# Patient Record
Sex: FEMALE | Employment: FULL TIME | URBAN - METROPOLITAN AREA
[De-identification: names, ages, dates, MRNs, and addresses within clinical notes are randomized per-mention and may not be internally consistent; named-entity substitution may affect disease eponyms.]

---

## 2017-01-06 ENCOUNTER — HOSPITAL ENCOUNTER (OUTPATIENT)
Dept: RADIOLOGY | Facility: HOSPITAL | Age: 57
Discharge: HOME | End: 2017-01-06
Attending: INTERNAL MEDICINE
Payer: MEDICARE

## 2017-01-06 ENCOUNTER — LAB VISIT (OUTPATIENT)
Dept: LAB | Facility: HOSPITAL | Age: 57
End: 2017-01-06
Attending: INTERNAL MEDICINE
Payer: MEDICARE

## 2017-01-06 DIAGNOSIS — Z12.31 ENCOUNTER FOR SCREENING MAMMOGRAM FOR BREAST CANCER: ICD-10-CM

## 2017-01-06 DIAGNOSIS — E87.6 HYPOKALEMIA: ICD-10-CM

## 2017-01-06 LAB
ANION GAP SERPL CALC-SCNC: 8 MMOL/L
BUN SERPL-MCNC: 19 MG/DL
CALCIUM SERPL-MCNC: 10.3 MG/DL
CHLORIDE SERPL-SCNC: 103 MMOL/L
CO2 SERPL-SCNC: 31 MMOL/L
CREAT SERPL-MCNC: 0.8 MG/DL
EST. GFR  (AFRICAN AMERICAN): >60 ML/MIN/1.73 M^2
EST. GFR  (NON AFRICAN AMERICAN): >60 ML/MIN/1.73 M^2
GLUCOSE SERPL-MCNC: 107 MG/DL
MAGNESIUM SERPL-MCNC: 2.2 MG/DL
POTASSIUM SERPL-SCNC: 4 MMOL/L
SODIUM SERPL-SCNC: 142 MMOL/L

## 2017-01-06 PROCEDURE — 36415 COLL VENOUS BLD VENIPUNCTURE: CPT | Mod: PO

## 2017-01-06 PROCEDURE — 83735 ASSAY OF MAGNESIUM: CPT

## 2017-01-06 PROCEDURE — 77067 SCR MAMMO BI INCL CAD: CPT | Mod: 26,,, | Performed by: RADIOLOGY

## 2017-01-06 PROCEDURE — 80048 BASIC METABOLIC PNL TOTAL CA: CPT

## 2017-01-06 PROCEDURE — 77063 BREAST TOMOSYNTHESIS BI: CPT | Mod: 26,,, | Performed by: RADIOLOGY

## 2017-01-09 ENCOUNTER — TELEPHONE (OUTPATIENT)
Dept: INTERNAL MEDICINE | Facility: CLINIC | Age: 57
End: 2017-01-09

## 2017-01-09 NOTE — TELEPHONE ENCOUNTER
----- Message from Cristal Enriquez MD sent at 1/8/2017  9:46 AM CST -----  Please note that your potassium has improved as has your glucose level  Please continue your potassium supplement as it appears to be working well for you.  Also note that your kidney function is good and your magnesium was normal.  We will plan to recheck the potassium when you return this summer  Cristal Tejeda

## 2017-01-11 ENCOUNTER — TELEPHONE (OUTPATIENT)
Dept: INTERNAL MEDICINE | Facility: CLINIC | Age: 57
End: 2017-01-11

## 2017-01-11 NOTE — TELEPHONE ENCOUNTER
----- Message from Cristal Enriquez MD sent at 1/10/2017  9:44 PM CST -----  Mammogram results  Cristal Tejeda

## 2017-01-16 ENCOUNTER — TELEPHONE (OUTPATIENT)
Dept: INTERNAL MEDICINE | Facility: CLINIC | Age: 57
End: 2017-01-16

## 2017-01-16 NOTE — TELEPHONE ENCOUNTER
----- Message from Cristal Enriquez MD sent at 1/15/2017  9:35 AM CST -----  Please note that your bone density revealed osteopenia ( thinning of the bone)  But not to the degree that prescriptive medication would be recommended  Taking calcium and vitamin D and exercising at least 180 minutes weekly will help strengthen your bones  Also,  PT ( physical therapy), usually a one time visit, to guide you with the exercises that help the bones most efficiently  Is a productive option.  Please advise if you are interested and a Consult order will be placed  Cristal Tejeda

## 2017-01-24 ENCOUNTER — TELEPHONE (OUTPATIENT)
Dept: INTERNAL MEDICINE | Facility: CLINIC | Age: 57
End: 2017-01-24

## 2017-01-24 NOTE — TELEPHONE ENCOUNTER
----- Message from Rosangela Kike sent at 1/24/2017  9:54 AM CST -----  Contact: [phone]  Patient is needing a Hep A vaccine and has an appointment to see  On 1-30 and would like to receive vaccine on that day for her Dr's visit. Please call to discuss.

## 2017-01-30 ENCOUNTER — OFFICE VISIT (OUTPATIENT)
Dept: INTERNAL MEDICINE | Facility: CLINIC | Age: 57
End: 2017-01-30
Payer: MEDICARE

## 2017-01-30 VITALS
HEIGHT: 64 IN | BODY MASS INDEX: 29.62 KG/M2 | TEMPERATURE: 98 F | WEIGHT: 173.5 LBS | DIASTOLIC BLOOD PRESSURE: 74 MMHG | RESPIRATION RATE: 16 BRPM | SYSTOLIC BLOOD PRESSURE: 124 MMHG | HEART RATE: 90 BPM

## 2017-01-30 DIAGNOSIS — R44.2 OLFACTORY HALLUCINATION: ICD-10-CM

## 2017-01-30 DIAGNOSIS — E87.5 HYPERKALEMIA: ICD-10-CM

## 2017-01-30 DIAGNOSIS — Z23 NEED FOR HEPATITIS A VACCINATION: ICD-10-CM

## 2017-01-30 DIAGNOSIS — I70.0 AORTIC ATHEROSCLEROSIS: ICD-10-CM

## 2017-01-30 DIAGNOSIS — I10 ESSENTIAL HYPERTENSION: Primary | ICD-10-CM

## 2017-01-30 PROCEDURE — 99499 UNLISTED E&M SERVICE: CPT | Mod: S$PBB,,, | Performed by: INTERNAL MEDICINE

## 2017-01-30 PROCEDURE — 99214 OFFICE O/P EST MOD 30 MIN: CPT | Mod: 25,S$GLB,, | Performed by: INTERNAL MEDICINE

## 2017-01-30 PROCEDURE — 3078F DIAST BP <80 MM HG: CPT | Mod: S$GLB,,, | Performed by: INTERNAL MEDICINE

## 2017-01-30 PROCEDURE — 90632 HEPA VACCINE ADULT IM: CPT | Mod: S$GLB,,, | Performed by: INTERNAL MEDICINE

## 2017-01-30 PROCEDURE — 90471 IMMUNIZATION ADMIN: CPT | Mod: S$GLB,,, | Performed by: INTERNAL MEDICINE

## 2017-01-30 PROCEDURE — 99999 PR PBB SHADOW E&M-EST. PATIENT-LVL III: CPT | Mod: PBBFAC,,, | Performed by: INTERNAL MEDICINE

## 2017-01-30 PROCEDURE — 1159F MED LIST DOCD IN RCRD: CPT | Mod: S$GLB,,, | Performed by: INTERNAL MEDICINE

## 2017-01-30 PROCEDURE — 3074F SYST BP LT 130 MM HG: CPT | Mod: S$GLB,,, | Performed by: INTERNAL MEDICINE

## 2017-01-30 RX ORDER — CIPROFLOXACIN 500 MG/1
500 TABLET ORAL 2 TIMES DAILY
Qty: 30 TABLET | Refills: 0 | Status: SHIPPED | OUTPATIENT
Start: 2017-01-30 | End: 2017-02-09

## 2017-01-30 NOTE — PROGRESS NOTES
56 y.o. femalepresents in f/u to  hypertension, and hypokalemia; and travel to [place]      HTN tx w/losartan 50 mg, and Dyzaide  Denied HA or dizziness  BP today==>124/74  Hypokalemia, improved w/ change in meds    Travel to [place], requests Hep A vaccine  Usually visits Mom in [facility] and doesn't worry 2/2 to being w/ family; but this time traveling across areas that she has not been to in yrs        ROS:  No fever, chills, or night sweats  No blurry vision or visual disturbance  No dysphagia or early satiety  No palpitations or tachycardia  No cough or wheezing  No GERD or abdominal pain  No numbness or focal deficits  Remainder of review negative except as previously noted    PAST MEDICAL HX: Reviewed  PAST SURGICAL HX: Reviewed  SOCIAL HX: Reviewed  FAMILY HX: Reviewed    PHYSICAL EXAM:  VS: As noted  GENERAL: WDWN, A&O, NAD, conversant and co-operative  EYES: Conj/lids unremarkable, sclera anicteric  MUSCULOSKELETAL: Gait normal. No CCE  NEUROLOGIC: CARRILLO. No tremor noted      IMPRESSION:  HTN, stable  HypoKalemia, improved   Aortic atherosclerosis, asx  Olfactory hallucinations, neg CT, plan Consult ENT   pt to call when she returns    PLAN:  Continue present meds  Hep A vaccine  Rx Cipro   ASA and compression socks for trip  Exercise  Consult ENT, pt to call when returns from trip  Call prn  RTC 6 mos

## 2017-03-16 ENCOUNTER — NURSE TRIAGE (OUTPATIENT)
Dept: ADMINISTRATIVE | Facility: CLINIC | Age: 57
End: 2017-03-16

## 2017-03-16 ENCOUNTER — TELEPHONE (OUTPATIENT)
Dept: INTERNAL MEDICINE | Facility: CLINIC | Age: 57
End: 2017-03-16

## 2017-03-16 NOTE — TELEPHONE ENCOUNTER
"This message is from the triage nurse.      Patient states for the last two days she has been feeling like the room is spinning and sometimes she has to sit down or hold on to something to keep her from falling. She has at times felt as though she may pass out. When she is sitting down she feels okay. She does report having diarrhea the past few days as well. She states she is drinking "a lot" of water and urinating regularly. She denies sob or chest pain. Her  is at home with her. Advised to be seen in ED and to have another adult to bring her. She does not want to do this. She is asking for a call back from her PCP nurse to get an appointment scheduled for tomorrow (after two pm if possible). Advised again regarding protocol recommendation. She doesn't feel this is necessary. Advised to drink fluids with electrolytes (gatorade or powerade) since she is having diarrhea and get up slowly when going from sitting to standing. Please contact caller with any further care advice.  "

## 2017-04-17 ENCOUNTER — LAB VISIT (OUTPATIENT)
Dept: LAB | Facility: HOSPITAL | Age: 57
End: 2017-04-17
Attending: INTERNAL MEDICINE
Payer: MEDICARE

## 2017-04-17 ENCOUNTER — OFFICE VISIT (OUTPATIENT)
Dept: INTERNAL MEDICINE | Facility: CLINIC | Age: 57
End: 2017-04-17
Payer: MEDICARE

## 2017-04-17 ENCOUNTER — PATIENT MESSAGE (OUTPATIENT)
Dept: INTERNAL MEDICINE | Facility: CLINIC | Age: 57
End: 2017-04-17

## 2017-04-17 VITALS
DIASTOLIC BLOOD PRESSURE: 82 MMHG | SYSTOLIC BLOOD PRESSURE: 138 MMHG | HEART RATE: 64 BPM | HEIGHT: 64 IN | BODY MASS INDEX: 29.32 KG/M2 | TEMPERATURE: 98 F | WEIGHT: 171.75 LBS | RESPIRATION RATE: 16 BRPM

## 2017-04-17 DIAGNOSIS — R19.5 DARK STOOLS: ICD-10-CM

## 2017-04-17 DIAGNOSIS — R10.13 EPIGASTRIC PAIN: Primary | ICD-10-CM

## 2017-04-17 DIAGNOSIS — R10.13 EPIGASTRIC PAIN: ICD-10-CM

## 2017-04-17 DIAGNOSIS — I10 ESSENTIAL HYPERTENSION: ICD-10-CM

## 2017-04-17 DIAGNOSIS — E87.6 HYPOKALEMIA: ICD-10-CM

## 2017-04-17 DIAGNOSIS — N62 MACROMASTIA: ICD-10-CM

## 2017-04-17 LAB
BASOPHILS # BLD AUTO: 0.02 K/UL
BASOPHILS NFR BLD: 0.2 %
DIFFERENTIAL METHOD: NORMAL
EOSINOPHIL # BLD AUTO: 0.2 K/UL
EOSINOPHIL NFR BLD: 1.7 %
ERYTHROCYTE [DISTWIDTH] IN BLOOD BY AUTOMATED COUNT: 12.6 %
HCT VFR BLD AUTO: 44.6 %
HGB BLD-MCNC: 14.8 G/DL
LYMPHOCYTES # BLD AUTO: 2.3 K/UL
LYMPHOCYTES NFR BLD: 24.3 %
MCH RBC QN AUTO: 28.6 PG
MCHC RBC AUTO-ENTMCNC: 33.2 %
MCV RBC AUTO: 86 FL
MONOCYTES # BLD AUTO: 0.5 K/UL
MONOCYTES NFR BLD: 5.7 %
NEUTROPHILS # BLD AUTO: 6.4 K/UL
NEUTROPHILS NFR BLD: 67.6 %
PLATELET # BLD AUTO: 237 K/UL
PMV BLD AUTO: 11.1 FL
RBC # BLD AUTO: 5.17 M/UL
WBC # BLD AUTO: 9.44 K/UL

## 2017-04-17 PROCEDURE — 99214 OFFICE O/P EST MOD 30 MIN: CPT | Mod: S$GLB,,, | Performed by: INTERNAL MEDICINE

## 2017-04-17 PROCEDURE — 83540 ASSAY OF IRON: CPT

## 2017-04-17 PROCEDURE — 3075F SYST BP GE 130 - 139MM HG: CPT | Mod: S$GLB,,, | Performed by: INTERNAL MEDICINE

## 2017-04-17 PROCEDURE — 85025 COMPLETE CBC W/AUTO DIFF WBC: CPT

## 2017-04-17 PROCEDURE — 99999 PR PBB SHADOW E&M-EST. PATIENT-LVL III: CPT | Mod: PBBFAC,,, | Performed by: INTERNAL MEDICINE

## 2017-04-17 PROCEDURE — 80053 COMPREHEN METABOLIC PANEL: CPT

## 2017-04-17 PROCEDURE — 1160F RVW MEDS BY RX/DR IN RCRD: CPT | Mod: S$GLB,,, | Performed by: INTERNAL MEDICINE

## 2017-04-17 PROCEDURE — 99499 UNLISTED E&M SERVICE: CPT | Mod: S$PBB,,, | Performed by: INTERNAL MEDICINE

## 2017-04-17 PROCEDURE — 3079F DIAST BP 80-89 MM HG: CPT | Mod: S$GLB,,, | Performed by: INTERNAL MEDICINE

## 2017-04-17 PROCEDURE — 36415 COLL VENOUS BLD VENIPUNCTURE: CPT | Mod: PO

## 2017-04-17 NOTE — PROGRESS NOTES
56 y.o. female presents in f/u to  hypertension, dizziness, and abdominal pain      Abdominal sx, BM 3-4 x daily  Sx Mid epigastric area w/ bloating; then more periumbilical area  Recent LBM and dizziness resolved  Tried to adjust diet and avoid sugar and carbs  Weight loss- none  Stool dark but no blood noted  Occ watery stool      HTN tx w/losartan 50 mg, and Dyazide  Denied HA or dizziness  BP today==>138/82  Hypokalemia, improved w/ change in meds    Neck, shoulder pain, and upper back pain x yrs w/ exacerbation of sx   Heavy breasts  Rashes in skin folds        ROS:  No fever, chills, or night sweats  No blurry vision or visual disturbance  No dysphagia or early satiety  No palpitations or tachycardia  No cough or wheezing  No GERD or abdominal pain  No numbness or focal deficits  Remainder of review negative except as previously noted    PAST MEDICAL HX: Reviewed  PAST SURGICAL HX: Reviewed  SOCIAL HX: Reviewed  FAMILY HX: Reviewed    PHYSICAL EXAM:  VS: As noted  GENERAL: WDWN, A&O, NAD, conversant and co-operative  EYES: Conj/lids unremarkable, sclera anicteric; eyelid rims pink  ENT: Mucus membrane /frenulum pink  Sinus NT  NECK: Supple w/o LN or TM  RESP: Efforts unlabored, lungs CTA  CV: Heart RRR, no carotid bruits noted, no LE edema, 1+ pedal pulses  GI: BS+, soft, tender over the mid epigastric and periumbilical region, =HSM noted  - percussion and no rebound  MUSCULOSKELETAL: Gait normal. No CCE, no CVAT  NEUROLOGIC: CARRILLO. No tremor noted  SKIN: Warm and dry      IMPRESSION:  Abdominal pain, mid epigastric and periumbilical  Dark stools, pt not taking iron supplement  HTN, stable  HypoKalemia, improved   Dizziness, resolved w/ increased hydration- fluids, electrolytes   did not  Go to ED as recommended  Macromastia, neck, shoulder, and back pain    PLAN:  Lab - CBC w/ iron studies  Consult GI  Start Nexium daily pre meals  Continue present meds  Rec Plastics for surgical options   Hydration- water  best  Call prn  RTC 6 mos

## 2017-04-18 LAB
ALBUMIN SERPL BCP-MCNC: 4.4 G/DL
ALP SERPL-CCNC: 117 U/L
ALT SERPL W/O P-5'-P-CCNC: 26 U/L
ANION GAP SERPL CALC-SCNC: 10 MMOL/L
AST SERPL-CCNC: 24 U/L
BILIRUB SERPL-MCNC: 0.3 MG/DL
BUN SERPL-MCNC: 17 MG/DL
CALCIUM SERPL-MCNC: 10.4 MG/DL
CHLORIDE SERPL-SCNC: 103 MMOL/L
CO2 SERPL-SCNC: 28 MMOL/L
CREAT SERPL-MCNC: 1 MG/DL
EST. GFR  (AFRICAN AMERICAN): >60 ML/MIN/1.73 M^2
EST. GFR  (NON AFRICAN AMERICAN): >60 ML/MIN/1.73 M^2
GLUCOSE SERPL-MCNC: 104 MG/DL
IRON SERPL-MCNC: 92 UG/DL
POTASSIUM SERPL-SCNC: 4.1 MMOL/L
PROT SERPL-MCNC: 7.8 G/DL
SATURATED IRON: 25 %
SODIUM SERPL-SCNC: 141 MMOL/L
TOTAL IRON BINDING CAPACITY: 364 UG/DL
TRANSFERRIN SERPL-MCNC: 246 MG/DL

## 2017-04-19 ENCOUNTER — TELEPHONE (OUTPATIENT)
Dept: INTERNAL MEDICINE | Facility: CLINIC | Age: 57
End: 2017-04-19

## 2017-04-19 ENCOUNTER — PATIENT MESSAGE (OUTPATIENT)
Dept: INTERNAL MEDICINE | Facility: CLINIC | Age: 57
End: 2017-04-19

## 2017-04-19 DIAGNOSIS — N62 MACROMASTIA: Primary | ICD-10-CM

## 2017-04-24 ENCOUNTER — TELEPHONE (OUTPATIENT)
Dept: INTERNAL MEDICINE | Facility: CLINIC | Age: 57
End: 2017-04-24

## 2017-04-24 NOTE — TELEPHONE ENCOUNTER
----- Message from Cristal Enriquez MD sent at 4/21/2017  2:14 PM CDT -----  Please note that your bloodwork was ok  Please do not hesitate to call/email if you have any questions or concerns  Cristal Tejeda

## 2017-04-26 ENCOUNTER — OFFICE VISIT (OUTPATIENT)
Dept: GASTROENTEROLOGY | Facility: CLINIC | Age: 57
End: 2017-04-26
Payer: MEDICARE

## 2017-04-26 VITALS
HEIGHT: 64 IN | WEIGHT: 171.5 LBS | DIASTOLIC BLOOD PRESSURE: 88 MMHG | SYSTOLIC BLOOD PRESSURE: 139 MMHG | BODY MASS INDEX: 29.28 KG/M2 | HEART RATE: 88 BPM

## 2017-04-26 DIAGNOSIS — R19.7 DIARRHEA, UNSPECIFIED TYPE: Primary | ICD-10-CM

## 2017-04-26 PROCEDURE — 99999 PR PBB SHADOW E&M-EST. PATIENT-LVL II: CPT | Mod: PBBFAC,,, | Performed by: NURSE PRACTITIONER

## 2017-04-26 PROCEDURE — 3075F SYST BP GE 130 - 139MM HG: CPT | Mod: S$GLB,,, | Performed by: NURSE PRACTITIONER

## 2017-04-26 PROCEDURE — 3079F DIAST BP 80-89 MM HG: CPT | Mod: S$GLB,,, | Performed by: NURSE PRACTITIONER

## 2017-04-26 PROCEDURE — 1160F RVW MEDS BY RX/DR IN RCRD: CPT | Mod: S$GLB,,, | Performed by: NURSE PRACTITIONER

## 2017-04-26 PROCEDURE — 99204 OFFICE O/P NEW MOD 45 MIN: CPT | Mod: S$GLB,,, | Performed by: NURSE PRACTITIONER

## 2017-04-26 RX ORDER — SUCRALFATE 1 G/1
1 TABLET ORAL 4 TIMES DAILY
Qty: 120 TABLET | Refills: 3 | Status: SHIPPED | OUTPATIENT
Start: 2017-04-26 | End: 2017-05-26

## 2017-04-26 NOTE — PROGRESS NOTES
Subjective:       Patient ID: Ivelisse Hay is a 56 y.o. female.    Chief Complaint: Diarrhea and Abdominal Pain    HPI  Reports over the last month has had epigastric abdominal pain, nausea and diarrhea persistently for one month.  She reports pain is worse with eating.  She will have urgency and loose stools occurring after eating and also occurring during the night.  She denies blood with bowel movements or black stools.  Is having at least 5 bowel movements daily.  She has chronic history of acid reflux and is using TUMS in the past daily.  She recently added Nexium which is not been helpful.  Pepto and anti-diarrheal have not been helpful.  She has eliminated dairy and caffeine as well as trial of gluten free with no change in symptoms.    She has never had EGD.  She had colonoscopy in 2012 which was unremarkable.      Review of Systems   Constitutional: Negative.  Negative for activity change, appetite change, fatigue, fever and unexpected weight change.   HENT: Negative.  Negative for sore throat and trouble swallowing.    Respiratory: Negative.  Negative for cough, choking and shortness of breath.    Cardiovascular: Negative.  Negative for chest pain.   Gastrointestinal: Positive for abdominal distention, abdominal pain, diarrhea and nausea. Negative for blood in stool, constipation and vomiting.   Genitourinary: Negative.  Negative for difficulty urinating, dysuria and hematuria.   Musculoskeletal: Negative.  Negative for neck pain and neck stiffness.   Skin: Negative.    Neurological: Negative.  Negative for dizziness, syncope, weakness and light-headedness.   Psychiatric/Behavioral: Negative.        Objective:      Physical Exam   Constitutional: She is oriented to person, place, and time. She appears well-developed and well-nourished. No distress.   HENT:   Head: Normocephalic.   Eyes: No scleral icterus.   Cardiovascular: Normal rate.    Pulmonary/Chest: Effort normal. No respiratory distress.    Abdominal: Soft. Normal appearance and bowel sounds are normal. She exhibits no distension and no mass. There is tenderness in the epigastric area, left upper quadrant and left lower quadrant.   Musculoskeletal: Normal range of motion. She exhibits no edema.   Neurological: She is alert and oriented to person, place, and time.   Skin: Skin is warm and dry. She is not diaphoretic.   Psychiatric: She has a normal mood and affect. Her behavior is normal. Judgment and thought content normal.   Vitals reviewed.      Assessment:       1. Diarrhea, unspecified type        Plan:     Ivelisse was seen today for diarrhea and abdominal pain.    Diagnoses and all orders for this visit:    Diarrhea, unspecified type  -     Clostridium difficile EIA; Future  -     Giardia / Cryptosporidum, EIA; Future  -     Stool culture; Future  -     Stool Exam-Ova,Cysts,Parasites; Future  -     H. pylori antigen, stool; Future  -     Case request GI: ESOPHAGOGASTRODUODENOSCOPY (EGD)    Other orders  -     sucralfate (CARAFATE) 1 gram tablet; Take 1 tablet (1 g total) by mouth 4 (four) times daily.    I have explained the planned procedures to the patient.The risks, benefits and alternatives of the procedure were also explained in detail. Patient verbalized understanding, all questions were answered. The patient agrees to proceed as planned    Will schedule EGD.  Trial of carafate.      Avoid NSAIDs.  Discussed diet modification.    Stool studies.  Can consider further workup with colonoscopy and/or imaging depending upon findings and symptoms.    Could consider bentyl and/or probiotic.

## 2017-04-26 NOTE — LETTER
April 26, 2017      Cristal Enriquez MD  [address] [facility] [address]           [facility]  [address]  [address]  [facility] [address]  Phone: [phone]          Patient: Ivelisse Hay   MR Number: [id number]   YOB: 1960   Date of Visit: 4/26/2017       Dear Dr. Cristal Enriquez:    Thank you for referring Ivelisse Hay to me for evaluation. Attached you will find relevant portions of my assessment and plan of care.    If you have questions, please do not hesitate to call me. I look forward to following Ivelisse Hay along with you.    Sincerely,    Rebeca Collier, MANJULA    Enclosure  CC:  No Recipients    If you would like to receive this communication electronically, please contact [email or web address] or [phone] to request more information on [facility] access.    For providers and/or their staff who would like to refer a patient to [facility], please contact us through our one-stop-shop provider referral line, [facility] , at [phone].    If you feel you have received this communication in error or would no longer like to receive these types of communications, please e-mail [email or web address]

## 2017-04-28 ENCOUNTER — LAB VISIT (OUTPATIENT)
Dept: LAB | Facility: HOSPITAL | Age: 57
End: 2017-04-28
Attending: NURSE PRACTITIONER
Payer: MEDICARE

## 2017-04-28 DIAGNOSIS — R19.7 DIARRHEA, UNSPECIFIED TYPE: ICD-10-CM

## 2017-04-28 LAB
C DIFF GDH STL QL: NEGATIVE
C DIFF TOX A+B STL QL IA: NEGATIVE

## 2017-04-28 PROCEDURE — 87449 NOS EACH ORGANISM AG IA: CPT

## 2017-04-28 PROCEDURE — 87427 SHIGA-LIKE TOXIN AG IA: CPT

## 2017-04-28 PROCEDURE — 87045 FECES CULTURE AEROBIC BACT: CPT

## 2017-04-28 PROCEDURE — 87338 HPYLORI STOOL AG IA: CPT

## 2017-04-28 PROCEDURE — 87329 GIARDIA AG IA: CPT

## 2017-04-28 PROCEDURE — 87046 STOOL CULTR AEROBIC BACT EA: CPT | Mod: 59

## 2017-04-28 PROCEDURE — 87209 SMEAR COMPLEX STAIN: CPT

## 2017-04-30 LAB
CRYPTOSP AG STL QL IA: NEGATIVE
G LAMBLIA AG STL QL IA: NEGATIVE

## 2017-05-01 LAB
BACTERIA STL CULT: NORMAL
E COLI SXT1 STL QL IA: NEGATIVE
E COLI SXT2 STL QL IA: NEGATIVE
O+P STL TRI STN: NORMAL

## 2017-05-03 LAB — H PYLORI AG STL QL: NOT DETECTED

## 2017-05-04 ENCOUNTER — PATIENT MESSAGE (OUTPATIENT)
Dept: GASTROENTEROLOGY | Facility: CLINIC | Age: 57
End: 2017-05-04

## 2017-05-04 ENCOUNTER — TELEPHONE (OUTPATIENT)
Dept: GASTROENTEROLOGY | Facility: CLINIC | Age: 57
End: 2017-05-04

## 2017-05-04 NOTE — TELEPHONE ENCOUNTER
JORDAN to call the office back         ----- Message from SOHEILA Hackett sent at 5/4/2017  8:51 AM CDT -----  Let pt know that stool studies are negative

## 2017-05-05 ENCOUNTER — ANESTHESIA EVENT (OUTPATIENT)
Dept: ENDOSCOPY | Facility: HOSPITAL | Age: 57
End: 2017-05-05
Payer: MEDICARE

## 2017-05-05 ENCOUNTER — ANESTHESIA (OUTPATIENT)
Dept: ENDOSCOPY | Facility: HOSPITAL | Age: 57
End: 2017-05-05
Payer: MEDICARE

## 2017-05-05 ENCOUNTER — HOSPITAL ENCOUNTER (OUTPATIENT)
Facility: HOSPITAL | Age: 57
Discharge: HOME | End: 2017-05-05
Attending: INTERNAL MEDICINE | Admitting: INTERNAL MEDICINE
Payer: MEDICARE

## 2017-05-05 VITALS
HEART RATE: 60 BPM | SYSTOLIC BLOOD PRESSURE: 118 MMHG | OXYGEN SATURATION: 100 % | DIASTOLIC BLOOD PRESSURE: 62 MMHG | TEMPERATURE: 98 F | WEIGHT: 178 LBS | HEIGHT: 64 IN | RESPIRATION RATE: 15 BRPM | BODY MASS INDEX: 30.39 KG/M2

## 2017-05-05 DIAGNOSIS — R10.13 EPIGASTRIC PAIN: ICD-10-CM

## 2017-05-05 PROCEDURE — 25000003 PHARM REV CODE 250: Performed by: INTERNAL MEDICINE

## 2017-05-05 PROCEDURE — 43239 EGD BIOPSY SINGLE/MULTIPLE: CPT | Mod: ,,, | Performed by: INTERNAL MEDICINE

## 2017-05-05 PROCEDURE — 88305 TISSUE EXAM BY PATHOLOGIST: CPT | Performed by: PATHOLOGY

## 2017-05-05 PROCEDURE — 63600175 PHARM REV CODE 636 W HCPCS: Performed by: NURSE ANESTHETIST, CERTIFIED REGISTERED

## 2017-05-05 PROCEDURE — 37000008 HC ANESTHESIA 1ST 15 MINUTES: Performed by: INTERNAL MEDICINE

## 2017-05-05 PROCEDURE — 25000003 PHARM REV CODE 250: Performed by: NURSE ANESTHETIST, CERTIFIED REGISTERED

## 2017-05-05 PROCEDURE — 43239 EGD BIOPSY SINGLE/MULTIPLE: CPT | Performed by: INTERNAL MEDICINE

## 2017-05-05 PROCEDURE — 37000009 HC ANESTHESIA EA ADD 15 MINS: Performed by: INTERNAL MEDICINE

## 2017-05-05 PROCEDURE — 88305 TISSUE EXAM BY PATHOLOGIST: CPT | Mod: 26,,, | Performed by: PATHOLOGY

## 2017-05-05 PROCEDURE — 27201012 HC FORCEPS, HOT/COLD, DISP: Performed by: INTERNAL MEDICINE

## 2017-05-05 RX ORDER — LIDOCAINE HCL/PF 100 MG/5ML
SYRINGE (ML) INTRAVENOUS
Status: DISCONTINUED | OUTPATIENT
Start: 2017-05-05 | End: 2017-05-05

## 2017-05-05 RX ORDER — PROPOFOL 10 MG/ML
VIAL (ML) INTRAVENOUS
Status: DISCONTINUED | OUTPATIENT
Start: 2017-05-05 | End: 2017-05-05

## 2017-05-05 RX ORDER — SODIUM CHLORIDE 9 MG/ML
INJECTION, SOLUTION INTRAVENOUS CONTINUOUS
Status: DISCONTINUED | OUTPATIENT
Start: 2017-05-05 | End: 2017-05-05 | Stop reason: HOSPADM

## 2017-05-05 RX ADMIN — PROPOFOL 50 MG: 10 INJECTION, EMULSION INTRAVENOUS at 10:05

## 2017-05-05 RX ADMIN — PROPOFOL 30 MG: 10 INJECTION, EMULSION INTRAVENOUS at 10:05

## 2017-05-05 RX ADMIN — TOPICAL ANESTHETIC 1 EACH: 200 SPRAY DENTAL; PERIODONTAL at 10:05

## 2017-05-05 RX ADMIN — SODIUM CHLORIDE: 0.9 INJECTION, SOLUTION INTRAVENOUS at 10:05

## 2017-05-05 RX ADMIN — LIDOCAINE HYDROCHLORIDE 100 MG: 20 INJECTION, SOLUTION INTRAVENOUS at 10:05

## 2017-05-05 NOTE — INTERVAL H&P NOTE
The patient has been examined and the H&P has been reviewed:    I concur with the findings and no changes have occurred since H&P was written.    Anesthesia/Surgery risks, benefits and alternative options discussed and understood by patient/family.          Active Hospital Problems    Diagnosis  POA    Epigastric pain [R10.13]  Yes      Resolved Hospital Problems    Diagnosis Date Resolved POA   No resolved problems to display.

## 2017-05-05 NOTE — IP AVS SNAPSHOT
\[facility]\""  [address]  [place] [address]  Phone: [phone]           Patient Discharge Instructions   Our goal is to set you up for success. This packet includes information on your condition, medications, and your home care.  It will help you care for yourself to prevent having to return to the hospital.     Please ask your nurse if you have any questions.      There are many details to remember when preparing to leave the hospital. Here is what you will need to do:    1. Take your medicine. If you are prescribed medications, review your Medication List on the following pages. You may have new medications to  at the pharmacy and others that you'll need to stop taking. Review the instructions for how and when to take your medications. Talk with your doctor or nurses if you are unsure of what to do.     2. Go to your follow-up appointments. Specific follow-up information is listed in the following pages. Your may be contacted by a nurse or clinical provider about future appointments. Be sure we have all of the phone numbers to reach you. Please contact your provider's office if you are unable to make an appointment.     3. Watch for warning signs. Your doctor or nurse will give you detailed warning signs to watch for and when to call for assistance. These instructions may also include educational information about your condition. If you experience any of warning signs to your health, call your doctor.               ** Verify the list of medication(s) below is accurate and up to date. Carry this with you in case of emergency. If your medications have changed, please notify your healthcare provider.             Medication List      CONTINUE taking these medications        Additional Info                      aspirin 81 MG EC tablet   Commonly known as:  ECOTRIN   Refills:  0   Dose:  81 mg    Instructions:  Take 81 mg by mouth once daily.     Begin Date    AM    Noon    PM    Bedtime        atorvastatin 20 MG tablet   Commonly known as:  LIPITOR   Quantity:  90 tablet   Refills:  1   Dose:  20 mg    Instructions:  Take 1 tablet (20 mg total) by mouth once daily.     Begin Date    AM    Noon    PM    Bedtime       fish oil-omega-3 fatty acids 300-1,000 mg capsule   Refills:  0   Dose:  2 g    Instructions:  Take 2 g by mouth once daily.     Begin Date    AM    Noon    PM    Bedtime       Lactobacillus rhamnosus GG 10 billion cell capsule   Commonly known as:  CULTURELLE   Refills:  0   Dose:  1 capsule    Instructions:  Take 1 capsule by mouth once daily.     Begin Date    AM    Noon    PM    Bedtime       losartan 50 MG tablet   Commonly known as:  COZAAR   Quantity:  90 tablet   Refills:  1   Dose:  50 mg    Instructions:  Take 1 tablet (50 mg total) by mouth once daily.     Begin Date    AM    Noon    PM    Bedtime       magnesium 30 mg Tab   Refills:  0    Instructions:  Take by mouth.     Begin Date    AM    Noon    PM    Bedtime       multivitamin capsule   Refills:  0   Dose:  1 capsule    Instructions:  Take 1 capsule by mouth once daily.     Begin Date    AM    Noon    PM    Bedtime       potassium chloride 8 MEQ Tbsr   Commonly known as:  KLOR-CON   Quantity:  180 tablet   Refills:  1   Dose:  8 mEq    Instructions:  Take 1 tablet (8 mEq total) by mouth 2 (two) times daily.     Begin Date    AM    Noon    PM    Bedtime       sucralfate 1 gram tablet   Commonly known as:  CARAFATE   Quantity:  120 tablet   Refills:  3   Dose:  1 g    Instructions:  Take 1 tablet (1 g total) by mouth 4 (four) times daily.     Begin Date    AM    Noon    PM    Bedtime       triamterene-hydrochlorothiazide 37.5-25 mg 37.5-25 mg per capsule   Commonly known as:  DYAZIDE   Quantity:  90 capsule   Refills:  1   Dose:  1 capsule    Instructions:  Take 1 capsule by mouth every morning.     Begin Date    AM    Noon    PM    Bedtime                  Please bring to all follow up appointments:    1. A copy of your  discharge instructions.  2. All medicines you are currently taking in their original bottles.  3. Identification and insurance card.    Please arrive 15 minutes ahead of scheduled appointment time.    Please call 24 hours in advance if you must reschedule your appointment and/or time.        Your Scheduled Appointments     May 31, 2017  2:00 PM CDT   Plastic Surgery Consult with MD Ryley Garay [facility] [place] ([facility] )    [address]  [facility] [address] [phone]                Discharge Instructions     Future Orders    Activity as tolerated     Diet general     Questions:    Total calories:      Fat restriction, if any:      Protein restriction, if any:      Na restriction, if any:      Fluid restriction:      Additional restrictions:          Discharge Instructions       Post EGD Discharge Instruction    Ivelisse Hay  5/5/2017  Dasia Jo MD    RESTRICTIONS ON ACTIVITY:    -DO NOT drive a car or operate machinery until the day after procedure.  -Following Day: Return to full activities including work.  -Diet: Eat and drink normally unless instructed otherwise.    TREATMENT FOR COMMON SIDE EFFECTS:  *Sore Throat - treat with throat lozenges, gargle with warm salt water.  *Mild abdominal pain & bloating- rest and take liquids only.    SYMPTOMS TO WATCH FOR AND REPORT TO YOUR PHYSICIAN:  1. Chills or fever occurring 24 hours after procedure.  2. Pain in chest.  3. SEVERE abdominal pain or bloating.  4. Rectal bleeding which could be maroon or black.    If you have any questions or problems, please call your Physician:    Dasia Jo MD Phone: ***    Lab Results: [phone]    If a complication or emergency situation arises and you are unable to reach your Physician - GO TO THE EMERGENCY ROOM.          Admission Information     Date & Time Provider Department CSN    5/5/2017  9:47 AM [facility]  [facility] [id number]      Care Providers     Provider Role Specialty Primary office phone    Dasia Jo MD Attending Provider Gastroenterology [phone]    Dasia Jo MD Surgeon  Gastroenterology [phone]      Your Vitals Were     BP Pulse Temp Resp Height Weight    116/69 70 98.3 °F (36.8 °C) 15 5' 4" (1.626 m) 80.7 kg (178 lb)    SpO2 BMI             98% 30.55 kg/m2         Recent Lab Values        5/20/2010 9/1/2010 5/15/2013 12/24/2013 4/29/2014 8/21/2014 6/18/2015 12/5/2016     11:49 AM  2:55 PM  5:24 PM 11:50 AM  4:23 PM 11:40 AM 11:09 AM  9:50 AM    A1C 5.5 5.7 5.6 6.2 6.0 5.8 5.9 5.9    Comment for A1C at  9:50 AM on 12/5/2016:  According to ADA guidelines, hemoglobin A1C <7.0% represents  optimal control in non-pregnant diabetic patients.  Different  metrics may apply to specific populations.   Standards of Medical Care in Diabetes - 2016.  For the purpose of screening for the presence of diabetes:  <5.7%     Consistent with the absence of diabetes  5.7-6.4%  Consistent with increasing risk for diabetes   (prediabetes)  >or=6.5%  Consistent with diabetes  Currently no consensus exists for use of hemoglobin A1C  for diagnosis of diabetes for children.        Pending Labs     Order Current Status    Specimen to Pathology - Surgery Collected (05/05/17 1048)      Allergies as of 5/5/2017     No Known Allergies      [facility]     [facility] Nurse Care Line - 24/7 Assistance  Unless otherwise directed by your provider, please contact [facility], our nurse care line that is available for 24/7 assistance.     Registered nurses in the [facility] provide clinical advisement, health education, appointment booking, and other advisory services.  Call for this free service at [phone].        Advance Directives     An advance directive is a document which, in the event you are no longer able to make decisions for yourself, tells your healthcare " team what kind of treatment you do or do not want to receive, or who you would like to make those decisions for you.  If you do not currently have an advance directive, [facility] encourages you to create one.  For more information call:  [phone]), [phone] ([phone]),  or log on to [email or web address].        Language Assistance Services     ATTENTION: Language assistance services are available, free of charge. Please call [phone].      ATENCIÓN: Si habla español, tiene a joseph disposición servicios gratuitos de asistencia lingüística. Llame al [phone].     CHÚ Ý: N?u b?n nói Ti?ng Vi?t, có các d?ch v? h? tr? ngôn ng? mi?n phí dành cho b?n. G?i s? [phone].         [facility] complies with applicable Federal civil rights laws and does not discriminate on the basis of race, color, national origin, age, disability, or sex.

## 2017-05-05 NOTE — ANESTHESIA PREPROCEDURE EVALUATION
05/05/2017  Ivelisse Hay is a 56 y.o., female for EGD under MAC    Anesthesia Evaluation    I have reviewed the Patient Summary Reports.    I have reviewed the Nursing Notes.   I have reviewed the Medications.     Review of Systems  Anesthesia Hx:   Denies Personal Hx of Anesthesia complications.   Social:  Non-Smoker, No Alcohol Use    Cardiovascular:   Exercise tolerance: good Hypertension hyperlipidemia    Pulmonary:  Pulmonary Normal    Renal/:   renal calculi    Hepatic/GI:   GERD    Neurological:  Neurology Normal    Endocrine:  Endocrine Normal        Physical Exam  General:  Well nourished    Airway/Jaw/Neck:  Airway Findings: Mouth Opening: Normal Tongue: Normal  General Airway Assessment: Adult  Mallampati: II  TM Distance: Normal, at least 6 cm      Dental:  Dental Findings: In tact   Chest/Lungs:  Chest/Lungs Clear    Heart/Vascular:  Heart Findings: Normal       Mental Status:  Mental Status Findings:  Alert and Oriented, Cooperative         Anesthesia Plan  Type of Anesthesia, risks & benefits discussed:  Anesthesia Type:  MAC  Patient's Preference:   Intra-op Monitoring Plan:   Intra-op Monitoring Plan Comments:   Post Op Pain Control Plan:   Post Op Pain Control Plan Comments:   Induction:   IV and Inhalation  Beta Blocker:  Patient is not currently on a Beta-Blocker (No further documentation required).       Informed Consent: Patient understands risks and agrees with Anesthesia plan.  Questions answered. Anesthesia consent signed with patient.  ASA Score: 2     Day of Surgery Review of History & Physical:    H&P update referred to the surgeon.         Ready For Surgery From Anesthesia Perspective.

## 2017-05-05 NOTE — ANESTHESIA POSTPROCEDURE EVALUATION
"Anesthesia Post Evaluation    Patient: Ivelisse Hay    Procedure(s) Performed: Procedure(s) (LRB):  ESOPHAGOGASTRODUODENOSCOPY (EGD) (N/A)    Final Anesthesia Type: MAC  Patient location during evaluation: [facility]  Patient participation: Yes- Able to Participate  Level of consciousness: awake and alert and oriented  Post-procedure vital signs: reviewed and stable  Pain management: adequate  Airway patency: patent  PONV status at discharge: No PONV  Anesthetic complications: no      Cardiovascular status: blood pressure returned to baseline, hemodynamically stable and stable  Respiratory status: room air, unassisted and spontaneous ventilation  Hydration status: euvolemic  Follow-up not needed.        Visit Vitals    BP (!) 143/75    Pulse 61    Temp 36.8 °C (98.3 °F)    Resp 18    Ht 5' 4" (1.626 m)    Wt 80.7 kg (178 lb)    SpO2 99%    Breastfeeding No    BMI 30.55 kg/m2       Pain/Yoan Score: No Data Recorded      "

## 2017-05-05 NOTE — TRANSFER OF CARE
"Anesthesia Transfer of Care Note    Patient: Ivelisse Hay    Procedure(s) Performed: Procedure(s) (LRB):  ESOPHAGOGASTRODUODENOSCOPY (EGD) (N/A)    Patient location: [facility]    Anesthesia Type: MAC    Transport from OR: Transported from OR on room air with adequate spontaneous ventilation    Post pain: adequate analgesia    Post assessment: no apparent anesthetic complications    Post vital signs: stable    Level of consciousness: awake, alert and oriented    Nausea/Vomiting: no nausea/vomiting    Complications: none          Last vitals:   Visit Vitals    BP (!) 143/75    Pulse 61    Temp 36.8 °C (98.3 °F)    Resp 18    Ht 5' 4" (1.626 m)    Wt 80.7 kg (178 lb)    SpO2 99%    Breastfeeding No    BMI 30.55 kg/m2     "

## 2017-05-05 NOTE — DISCHARGE INSTRUCTIONS
Post EGD Discharge Instruction    Ivelisse Hay  5/5/2017  Dasia Jo MD    RESTRICTIONS ON ACTIVITY:    -DO NOT drive a car or operate machinery until the day after procedure.  -Following Day: Return to full activities including work.  -Diet: Eat and drink normally unless instructed otherwise.    TREATMENT FOR COMMON SIDE EFFECTS:  *Sore Throat - treat with throat lozenges, gargle with warm salt water.  *Mild abdominal pain & bloating- rest and take liquids only.    SYMPTOMS TO WATCH FOR AND REPORT TO YOUR PHYSICIAN:  1. Chills or fever occurring 24 hours after procedure.  2. Pain in chest.  3. SEVERE abdominal pain or bloating.  4. Rectal bleeding which could be maroon or black.    If you have any questions or problems, please call your Physician:    Dasia Jo MD Phone: ***    Lab Results: [phone]    If a complication or emergency situation arises and you are unable to reach your Physician - GO TO THE EMERGENCY ROOM.

## 2017-05-09 ENCOUNTER — TELEPHONE (OUTPATIENT)
Dept: GASTROENTEROLOGY | Facility: CLINIC | Age: 57
End: 2017-05-09

## 2017-05-09 NOTE — TELEPHONE ENCOUNTER
JORDAN to call the office back           --- Message from SOHEILA Hackett sent at 5/9/2017  4:17 PM CDT -----  Let pt know that biopsy from EGD is negative for h.pylori

## 2017-05-10 ENCOUNTER — TELEPHONE (OUTPATIENT)
Dept: GASTROENTEROLOGY | Facility: CLINIC | Age: 57
End: 2017-05-10

## 2017-05-10 NOTE — TELEPHONE ENCOUNTER
Spoke with pt to let her know that her bx was negative. Verbal Understanding.           ----- Message from Sana Del Angel sent at 5/10/2017 10:09 AM CDT -----  No. [phone]    Patient returned your call.

## 2017-05-19 ENCOUNTER — TELEPHONE (OUTPATIENT)
Dept: GASTROENTEROLOGY | Facility: CLINIC | Age: 57
End: 2017-05-19

## 2017-05-19 NOTE — TELEPHONE ENCOUNTER
Informed pt that her pathology results were not back yet. Verbal Understanding.        ----- Message from Sana Del Angel sent at 5/19/2017 11:19 AM CDT -----  No. [phone]   Please call patient with biopsy results.

## 2017-05-22 ENCOUNTER — PATIENT MESSAGE (OUTPATIENT)
Dept: GASTROENTEROLOGY | Facility: CLINIC | Age: 57
End: 2017-05-22

## 2017-05-24 ENCOUNTER — PATIENT MESSAGE (OUTPATIENT)
Dept: GASTROENTEROLOGY | Facility: CLINIC | Age: 57
End: 2017-05-24

## 2017-05-24 RX ORDER — DICYCLOMINE HYDROCHLORIDE 10 MG/1
10 CAPSULE ORAL 4 TIMES DAILY PRN
Qty: 120 CAPSULE | Refills: 3 | Status: SHIPPED | OUTPATIENT
Start: 2017-05-24 | End: 2017-06-23

## 2017-05-31 ENCOUNTER — OFFICE VISIT (OUTPATIENT)
Dept: PLASTIC SURGERY | Facility: CLINIC | Age: 57
End: 2017-05-31
Payer: MEDICARE

## 2017-05-31 VITALS
BODY MASS INDEX: 28.88 KG/M2 | SYSTOLIC BLOOD PRESSURE: 148 MMHG | HEIGHT: 64 IN | TEMPERATURE: 98 F | HEART RATE: 74 BPM | DIASTOLIC BLOOD PRESSURE: 89 MMHG | WEIGHT: 169.19 LBS

## 2017-05-31 DIAGNOSIS — G89.29 CHRONIC NECK AND BACK PAIN: ICD-10-CM

## 2017-05-31 DIAGNOSIS — N62 MACROMASTIA: Primary | ICD-10-CM

## 2017-05-31 DIAGNOSIS — M54.2 CHRONIC NECK AND BACK PAIN: ICD-10-CM

## 2017-05-31 DIAGNOSIS — M54.9 CHRONIC NECK AND BACK PAIN: ICD-10-CM

## 2017-05-31 PROCEDURE — 99999 PR PBB SHADOW E&M-EST. PATIENT-LVL III: CPT | Mod: PBBFAC,,, | Performed by: SURGERY

## 2017-05-31 PROCEDURE — 99204 OFFICE O/P NEW MOD 45 MIN: CPT | Mod: S$GLB,,, | Performed by: SURGERY

## 2017-05-31 NOTE — PROGRESS NOTES
HISTORY OF PRESENT ILLNESS:  Ivelisse Hay presents [facility]   with a chief complaint of chronic neck and back pain secondary to heavy   pendulous breasts.  She has been complaining of this pain for approximately five   years.  She has tried nonsteroidal anti-inflammatory medications including   ibuprofen without relief of the pain.  The patient also complains of deep   shoulder grooving secondary to brassiere strap and macerating rashes treated   with medicated ointments and cortisone creams.  This patient is thin.  She is 5 feet 4 inches in height, weighs 76 kg and has   42D cup breasts.  Shoulders show grooving.  Skin underneath her breasts show   evidence of previous rashes.  Her breasts show macromastia.    ASSESSMENT:  1.  Chronic neck pain.  2.  Chronic back pain.  3.  Chronic macerating rash.  4.  Macromastia.    PLAN:  The patient will need to undergo bilateral breast reduction, removing   approximately 700 g of breast tissue from each side.  We will go ahead and refer   her for physical therapy.  If she should fail physical therapy, I think she has   every medical indication for a reduction.      SAIRA/ALEX  dd: 05/31/2017 17:00:46 (CDT)  td: 06/01/2017 09:51:51 (CDT)  Doc ID   #[id number]  Job ID #[id number]    CC:

## 2017-05-31 NOTE — LETTER
[facility]  [address]  [facility] [address]  Phone: [phone]  Fax: [phone] Marion 15, 2017      Cristal Enriquez MD  [address] [facility] [place] [address]    Patient: Ivelisse Hay   MR Number: [id number]   YOB: 1960   Date of Visit: 5/31/2017     Dear Dr. Enriquez:    Thank you for referring Ivelisse Hay to me for evaluation. Below are the relevant portions of my assessment and plan of care.    Ms. Hay presents to the [facility] with a chief complaint of chronic neck and back pain secondary to heavy pendulous breasts.  She has been complaining of this pain for approximately five years.  She has tried nonsteroidal anti-inflammatory medications including Ibuprofen without relief of the pain.  She also complains of deep shoulder grooving secondary to brassiere strap and macerating rashes treated with medicated ointments and cortisone creams. She is thin.  She is 5 feet 4 inches in height, weighs 76 kg and has 42D cup breasts. Her shoulders show grooving and the skin underneath her breasts show evidence of previous rashes.  Her breasts show macromastia.     ASSESSMENT:  1.  Chronic neck pain.  2.  Chronic back pain.  3.  Chronic macerating rash.  4.  Macromastia.     PLAN:    She will need to undergo bilateral breast reduction, removing approximately 700 g of breast tissue from each side.  We proceed and refer her for physical therapy.  If she should fail physical therapy, I think she has every medical indication for a reduction.    If you have questions, please do not hesitate to call me. I look forward to following Ivelisse along with you.    Sincerely,        Raad Rodriguez MD  Section of Plastic & Reconstructive Surgery  [facility]     SAIRA/

## 2017-05-31 NOTE — LETTER
June 2, 2017      Cristal Enriquez MD  [address]  [place] [address]           [facility]  [address]  [facility] [address]  Phone: [phone]  Fax: [phone]          Patient: Ivelisse Hay   MR Number: [id number]   YOB: 1960   Date of Visit: 5/31/2017       Dear Dr. Cristal Enriquez:    Thank you for referring Ivelisse Hay to me for evaluation. Attached you will find relevant portions of my assessment and plan of care.    If you have questions, please do not hesitate to call me. I look forward to following Ivelisse Hay along with you.    Sincerely,    Raad Rodriguez MD    Enclosure  CC:  No Recipients    If you would like to receive this communication electronically, please contact [email or web address] or [phone] to request more information on [facility] [employer] access.    For providers and/or their staff who would like to refer a patient to [facility], please contact us through our one-stop-shop provider referral line, [facility], at [phone].    If you feel you have received this communication in error or would no longer like to receive these types of communications, please e-mail [email or web address]

## 2017-06-05 DIAGNOSIS — M54.40 CHRONIC LOW BACK PAIN WITH SCIATICA, SCIATICA LATERALITY UNSPECIFIED, UNSPECIFIED BACK PAIN LATERALITY: Primary | ICD-10-CM

## 2017-06-05 DIAGNOSIS — G89.29 CHRONIC NECK PAIN: ICD-10-CM

## 2017-06-05 DIAGNOSIS — M54.2 CHRONIC NECK PAIN: ICD-10-CM

## 2017-06-05 DIAGNOSIS — G89.29 CHRONIC LOW BACK PAIN WITH SCIATICA, SCIATICA LATERALITY UNSPECIFIED, UNSPECIFIED BACK PAIN LATERALITY: Primary | ICD-10-CM

## 2017-06-12 ENCOUNTER — CLINICAL SUPPORT (OUTPATIENT)
Dept: REHABILITATION | Facility: HOSPITAL | Age: 57
End: 2017-06-12
Attending: SURGERY
Payer: MEDICARE

## 2017-06-12 DIAGNOSIS — M54.40 CHRONIC LOW BACK PAIN WITH SCIATICA, SCIATICA LATERALITY UNSPECIFIED, UNSPECIFIED BACK PAIN LATERALITY: Primary | ICD-10-CM

## 2017-06-12 DIAGNOSIS — G89.29 CHRONIC LOW BACK PAIN WITH SCIATICA, SCIATICA LATERALITY UNSPECIFIED, UNSPECIFIED BACK PAIN LATERALITY: Primary | ICD-10-CM

## 2017-06-12 DIAGNOSIS — M54.2 CHRONIC NECK PAIN: ICD-10-CM

## 2017-06-12 DIAGNOSIS — G89.29 CHRONIC NECK PAIN: ICD-10-CM

## 2017-06-12 PROCEDURE — 97161 PT EVAL LOW COMPLEX 20 MIN: CPT

## 2017-06-12 PROCEDURE — G8981 BODY POS CURRENT STATUS: HCPCS | Mod: CL

## 2017-06-12 PROCEDURE — G8982 BODY POS GOAL STATUS: HCPCS | Mod: CK

## 2017-06-12 RX ORDER — TRIAMTERENE AND HYDROCHLOROTHIAZIDE 37.5; 25 MG/1; MG/1
CAPSULE ORAL
Qty: 90 CAPSULE | Refills: 2 | Status: SHIPPED | OUTPATIENT
Start: 2017-06-12 | End: 2018-03-13 | Stop reason: SDUPTHER

## 2017-06-12 NOTE — PLAN OF CARE
Name:Ivelisse Sharp Satnam  Physician:Raad Rodriguez*  Date of Eval:6/12/2017  Orders:  Physical Therapy evaluate and treat  Clinical#:[id number]  Diagnosis:  1. Chronic low back pain with sciatica, sciatica laterality unspecified, unspecified back pain laterality     2. Chronic neck pain         Visit 1 of 12. Expiration 8/12/17    SUBJECTIVE:    Onset of pain:  5 years  Mechanism of onset: gradual     Chief complaint: Patient is a 55 yo F referred to OP PT secondary neck and back pain.    Radicular symptoms: none  Bowel and Bladder incontinence: none  Dizziness: none    Aggravating factors: looking up, looking down, driving, turning head, prolonged standing, prolonged sitting, and increased walking distances.  Easing factors: moist heat    Sleep is disturbed. Sleeping position:  side    Previous functional status includes: I with amb and ADL  Current functional status : I with amb and ADL    Patients structured exercise routine:  none  Exercise routine prior to onset: elliptical, treadmill    Allergies:  Review of patient's allergies indicates:  No Known Allergies    Medical history:   Past Medical History:   Diagnosis Date    Diarrhea     Gastritis     GERD (gastroesophageal reflux disease)     HLD (hyperlipidemia)     Hypertension     Impaired fasting blood sugar        Past Surgical History:   Procedure Laterality Date    CYSTOSCOPY      NEPHROSTOMY TUBE      CYSTOSCOPY WITH STENT    SHOULDER SURGERY  9-17-13    right    URETERAL STENT PLACEMENT      THEN REMOVED       Medication:   Current Outpatient Prescriptions on File Prior to Visit   Medication Sig Dispense Refill    aspirin (ECOTRIN) 81 MG EC tablet Take 81 mg by mouth once daily.      atorvastatin (LIPITOR) 20 MG tablet Take 1 tablet (20 mg total) by mouth once daily. 90 tablet 1    dicyclomine (BENTYL) 10 MG capsule Take 1 capsule (10 mg total) by mouth 4 (four) times daily as needed. 120 capsule 3    fish oil-omega-3 fatty acids  300-1,000 mg capsule Take 2 g by mouth once daily.      Lactobacillus rhamnosus GG (CULTURELLE) 10 billion cell capsule Take 1 capsule by mouth once daily.      losartan (COZAAR) 50 MG tablet Take 1 tablet (50 mg total) by mouth once daily. 90 tablet 1    magnesium 30 mg Tab Take by mouth.      multivitamin capsule Take 1 capsule by mouth once daily.      potassium chloride (KLOR-CON) 8 MEQ TbSR Take 1 tablet (8 mEq total) by mouth 2 (two) times daily. 180 tablet 1    triamterene-hydrochlorothiazide 37.5-25 mg (DYAZIDE) 37.5-25 mg per capsule Take 1 capsule by mouth every morning. 90 capsule 1     No current facility-administered medications on file prior to visit.              Past treatment includes:  none    Work:  retired                      Pts goals: make my neck pain go away.  Pain level with 0 being the lowest and 10 being the highest presently: 8  Pain level with 0 being the lowest and 10 being the highest at worst:  9    OBJECTIVE:   Postural examination in standing:  - increased lumbar lordosis  - forward head  - forward shoulders  - R anterior rotated pelvis, L posterior rotated pelvis    Postural examination in sitting:   - decreased lumbar lordosis  - forward head  - forward shoulders  - protracted scapulae        Cervical AROM    flexion 20 deg   extension 38 deg   R rotation 45 deg   L rotation 40 deg   R side bending 20 deg   L side bending 12 deg     UE MMT R L   C3 Cervical side bend 5/5 5/5   C4 Shoulder Shrug 5/5  5/5   Shoulder flexion 5/5 5/5   Shoulder abduction 4/5 5/5   Shoulder IR 5/5 5/5   Shoulder ER 5/5 5/5   C5 Elbow flexion 5/5 5/5   C7 Elbow extension 5/5 5/5   C6 Wrist extension 5/5 5/5   Wrist flexion 5/5 5/5   Scapular protraction 4/5 4/5   Mid Traps 4-/5 4-/5   Lower traps 3/5 3/5     Flexibility testing:  - hamstrings:          B: WNL              - piriformis:             B: WNL         - quadriceps:         B: WNL         - hip flexors:           B: tight, decreased  25%  - hip adductors:     B: tight, decreased 25%  - IT bands:            B: tight, decreased 25%     MMT R L   Hip flexion 4+/5 4+/5   Hip abduction 3/5 3-/5   Hip extension 3+/5 3+/5   Hip ER 5/5 5/5   Hip IR 5/5 5/5   Knee extension 5/5 5/5   Knee flexion 5/5 5/5   Ankle dorsiflexion 5/5 5/5   Ankle plantar flexion 5/5 5/5   Ankle inversion 5/5 5/5   Ankle eversion 5/5 5/5     Lumbar Special Tests    SLR: negative   Cross SLR: negative   VARINDER B hip tigtness   Prone Instability negative       Cervical Special Tests    Compression negative   Distraction negative   Alar Ligament Stress Test: negative   Sharp-Michael Test negative   Spurling's:    negative     Lumbar active range of motion in standing is:  Flexion 70 deg   Extension 30 deg   Left side bending 25 deg   Right side bending 10 deg   Left rotation Decreased 50%   Right rotation Decreased 50%     Sensation:  - Light Touch: intact  - Saddle: intact    Reflexes:   - Knee Jerk: 1+ dimished    Other:   Functional Limitations  Reports - G Codes    Category:  Changing and Maintaining Body Positioning   Tool:  FOTO Outcomes Measurement System.   Score:  Patient scored out 32 of 100 on the FOTO Outcomes Measurement System.   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    TREATMENT:  Evaluation, low complexity    Pt was educated, performed, and was provided with a written copy of  HEP to perform as tolerated,  Including: HEP2Go Code:  "[id number]  Push/pull  Bridging  Resisted hip add    Exercises were reviewed and pt was able to demonstrate them prior to the end of the session.     No cultural, environmental, or spiritual barriers identified to treatment or learning.    Treatment today also included:  Push/pull 3 x 3"  Bridging 3 x 3"  Resisted hip add x 5"    ASSESSMENT:  PT diagnosis: neck pain, back pain, poor posture, weak scapular stabilizers, weak core    Patient can benefit from outpatient physical therapy and a home program.  Treatment will be directed at improving the following impairments.  Prognosis is fair. Will proceed with core strengthening, pelvic stabilization, scapular stabilization exercises, and B UE strengthening exercises to achieve below listed goals.    Medical necessity is demonstrated by the following  IMPAIRMENTS:  - poor posture  - pain  - decreased flexibility  - decreased muscle strength  - poor cardiovascular conditioning  - impaired function  - decreased work ability    GOALS:  8 weeks (8/12/17). Pt agrees with goals set.  1. Independent with HEP.  2. Report decreased cervical pain < or =  4/10 with adls such as looking up, looking down, driving, turning head, prolonged standing, and prolonged sitting.    3. Increased MMT for B UE by 1 muscle grade to promote proper scapular stabilization to decrease cervical pain < or =  4/10 with adls such as looking up, looking down, driving, turning head, prolonged standing, and prolonged sitting.   4. Report decreased lumbar pain < or =  4/10 with adls such as looking up, looking down, driving, turning head, prolonged standing, prolonged sitting, and increased walking distances.   5. Increased MMT for B LE by 1 muscle grade to promote proper pelvic stability to decrease lumbar pain < or =  4/10 with adls such as looking up, looking down, driving, turning head, prolonged standing, prolonged sitting, and increased walking distances.      6. Increased flexibility in B hip adductors, " B piriformis, B hip flexors, B IT bands, and B quads to promote proper pelvic stability to decrease lumbar pain < or =  4/10 with adls such as looking up, looking down, driving, turning head, prolonged standing, prolonged sitting, and increased walking distances.    7. Patient to achieve CK (at least 40% < 60% impaired, limited or restricted) level on the FOTO Outcomes Measurement System.    PLAN:  Outpatient physical therapy 2 times weekly to include: pt ed, hep, therapeutic exercises, neuromuscular re-education/ balance exercises, joint mobilizations, and modalities prn. Pt may be seen by PTA to carry out plan of care.      Cont PT for 8 weeks.     I certify the need for these services furnished under this plan of treatment and while under my care.    ____________________________________ Physician/Referring Practitioner                                                    Date of Signature

## 2017-06-12 NOTE — PROGRESS NOTES
Name:Ivelisse Sharp Satnam  Physician:Raad Rodriguez*  Date of Eval:6/12/2017  Orders:  Physical Therapy evaluate and treat  Clinical#:[id number]  Diagnosis:  1. Chronic low back pain with sciatica, sciatica laterality unspecified, unspecified back pain laterality     2. Chronic neck pain         Visit 1 of 12. Expiration 8/12/17    SUBJECTIVE:    Onset of pain:  5 years  Mechanism of onset: gradual     Chief complaint: Patient is a 55 yo F referred to OP PT secondary neck and back pain.    Radicular symptoms: none  Bowel and Bladder incontinence: none  Dizziness: none    Aggravating factors: looking up, looking down, driving, turning head, prolonged standing, prolonged sitting, and increased walking distances.  Easing factors: moist heat    Sleep is disturbed. Sleeping position:  side    Previous functional status includes: I with amb and ADL  Current functional status : I with amb and ADL    Patients structured exercise routine:  none  Exercise routine prior to onset: elliptical, treadmill    Allergies:  Review of patient's allergies indicates:  No Known Allergies    Medical history:   Past Medical History:   Diagnosis Date    Diarrhea     Gastritis     GERD (gastroesophageal reflux disease)     HLD (hyperlipidemia)     Hypertension     Impaired fasting blood sugar        Past Surgical History:   Procedure Laterality Date    CYSTOSCOPY      NEPHROSTOMY TUBE      CYSTOSCOPY WITH STENT    SHOULDER SURGERY  9-17-13    right    URETERAL STENT PLACEMENT      THEN REMOVED       Medication:   Current Outpatient Prescriptions on File Prior to Visit   Medication Sig Dispense Refill    aspirin (ECOTRIN) 81 MG EC tablet Take 81 mg by mouth once daily.      atorvastatin (LIPITOR) 20 MG tablet Take 1 tablet (20 mg total) by mouth once daily. 90 tablet 1    dicyclomine (BENTYL) 10 MG capsule Take 1 capsule (10 mg total) by mouth 4 (four) times daily as needed. 120 capsule 3    fish oil-omega-3 fatty acids  300-1,000 mg capsule Take 2 g by mouth once daily.      Lactobacillus rhamnosus GG (CULTURELLE) 10 billion cell capsule Take 1 capsule by mouth once daily.      losartan (COZAAR) 50 MG tablet Take 1 tablet (50 mg total) by mouth once daily. 90 tablet 1    magnesium 30 mg Tab Take by mouth.      multivitamin capsule Take 1 capsule by mouth once daily.      potassium chloride (KLOR-CON) 8 MEQ TbSR Take 1 tablet (8 mEq total) by mouth 2 (two) times daily. 180 tablet 1    triamterene-hydrochlorothiazide 37.5-25 mg (DYAZIDE) 37.5-25 mg per capsule Take 1 capsule by mouth every morning. 90 capsule 1     No current facility-administered medications on file prior to visit.              Past treatment includes:  none    Work:  retired                      Pts goals: make my neck pain go away.  Pain level with 0 being the lowest and 10 being the highest presently: 8  Pain level with 0 being the lowest and 10 being the highest at worst:  9    OBJECTIVE:   Postural examination in standing:  - increased lumbar lordosis  - forward head  - forward shoulders  - R anterior rotated pelvis, L posterior rotated pelvis    Postural examination in sitting:   - decreased lumbar lordosis  - forward head  - forward shoulders  - protracted scapulae        Cervical AROM    flexion 20 deg   extension 38 deg   R rotation 45 deg   L rotation 40 deg   R side bending 20 deg   L side bending 12 deg     UE MMT R L   C3 Cervical side bend 5/5 5/5   C4 Shoulder Shrug 5/5  5/5   Shoulder flexion 5/5 5/5   Shoulder abduction 4/5 5/5   Shoulder IR 5/5 5/5   Shoulder ER 5/5 5/5   C5 Elbow flexion 5/5 5/5   C7 Elbow extension 5/5 5/5   C6 Wrist extension 5/5 5/5   Wrist flexion 5/5 5/5   Scapular protraction 4/5 4/5   Mid Traps 4-/5 4-/5   Lower traps 3/5 3/5     Flexibility testing:  - hamstrings:          B: WNL              - piriformis:             B: WNL         - quadriceps:         B: WNL         - hip flexors:           B: tight, decreased  25%  - hip adductors:     B: tight, decreased 25%  - IT bands:            B: tight, decreased 25%     MMT R L   Hip flexion 4+/5 4+/5   Hip abduction 3/5 3-/5   Hip extension 3+/5 3+/5   Hip ER 5/5 5/5   Hip IR 5/5 5/5   Knee extension 5/5 5/5   Knee flexion 5/5 5/5   Ankle dorsiflexion 5/5 5/5   Ankle plantar flexion 5/5 5/5   Ankle inversion 5/5 5/5   Ankle eversion 5/5 5/5     Lumbar Special Tests    SLR: negative   Cross SLR: negative   VARINDER B hip tigtness   Prone Instability negative       Cervical Special Tests    Compression negative   Distraction negative   Alar Ligament Stress Test: negative   Sharp-Michael Test negative   Spurling's:    negative     Lumbar active range of motion in standing is:  Flexion 70 deg   Extension 30 deg   Left side bending 25 deg   Right side bending 10 deg   Left rotation Decreased 50%   Right rotation Decreased 50%     Sensation:  - Light Touch: intact  - Saddle: intact    Reflexes:   - Knee Jerk: 1+ dimished    Other:   Functional Limitations  Reports - G Codes    Category:  Changing and Maintaining Body Positioning   Tool:  FOTO Outcomes Measurement System.   Score:  Patient scored out 32 of 100 on the FOTO Outcomes Measurement System.   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    TREATMENT:  Evaluation, low complexity    Pt was educated, performed, and was provided with a written copy of  HEP to perform as tolerated,  Including: HEP2Go Code:  "[id number]  Push/pull  Bridging  Resisted hip add    Exercises were reviewed and pt was able to demonstrate them prior to the end of the session.     No cultural, environmental, or spiritual barriers identified to treatment or learning.    Treatment today also included:  Push/pull 3 x 3"  Bridging 3 x 3"  Resisted hip add x 5"    ASSESSMENT:  PT diagnosis: neck pain, back pain, poor posture, weak scapular stabilizers, weak core    Patient can benefit from outpatient physical therapy and a home program.  Treatment will be directed at improving the following impairments.  Prognosis is fair. Will proceed with core strengthening, pelvic stabilization, scapular stabilization exercises, and B UE strengthening exercises to achieve below listed goals.    Medical necessity is demonstrated by the following  IMPAIRMENTS:  - poor posture  - pain  - decreased flexibility  - decreased muscle strength  - poor cardiovascular conditioning  - impaired function  - decreased work ability    GOALS:  8 weeks (8/12/17). Pt agrees with goals set.  1. Independent with HEP.  2. Report decreased cervical pain < or =  4/10 with adls such as looking up, looking down, driving, turning head, prolonged standing, and prolonged sitting.    3. Increased MMT for B UE by 1 muscle grade to promote proper scapular stabilization to decrease cervical pain < or =  4/10 with adls such as looking up, looking down, driving, turning head, prolonged standing, and prolonged sitting.   4. Report decreased lumbar pain < or =  4/10 with adls such as looking up, looking down, driving, turning head, prolonged standing, prolonged sitting, and increased walking distances.   5. Increased MMT for B LE by 1 muscle grade to promote proper pelvic stability to decrease lumbar pain < or =  4/10 with adls such as looking up, looking down, driving, turning head, prolonged standing, prolonged sitting, and increased walking distances.      6. Increased flexibility in B hip adductors, " B piriformis, B hip flexors, B IT bands, and B quads to promote proper pelvic stability to decrease lumbar pain < or =  4/10 with adls such as looking up, looking down, driving, turning head, prolonged standing, prolonged sitting, and increased walking distances.    7. Patient to achieve CK (at least 40% < 60% impaired, limited or restricted) level on the FOTO Outcomes Measurement System.    PLAN:  Outpatient physical therapy 2 times weekly to include: pt ed, hep, therapeutic exercises, neuromuscular re-education/ balance exercises, joint mobilizations, and modalities prn. Pt may be seen by PTA to carry out plan of care.      Cont PT for 8 weeks.     I certify the need for these services furnished under this plan of treatment and while under my care.    ____________________________________ Physician/Referring Practitioner                                                    Date of Signature

## 2017-06-15 NOTE — ADDENDUM NOTE
Encounter addended by: Mali Mancia MA on: 6/15/2017  8:19 AM<BR>    Actions taken: Letter status changed

## 2017-06-19 ENCOUNTER — CLINICAL SUPPORT (OUTPATIENT)
Dept: REHABILITATION | Facility: HOSPITAL | Age: 57
End: 2017-06-19
Attending: SURGERY
Payer: MEDICARE

## 2017-06-19 DIAGNOSIS — G89.29 CHRONIC NECK PAIN: ICD-10-CM

## 2017-06-19 DIAGNOSIS — M54.2 CHRONIC NECK PAIN: ICD-10-CM

## 2017-06-19 PROCEDURE — 97110 THERAPEUTIC EXERCISES: CPT

## 2017-06-19 NOTE — PROGRESS NOTES
"                                                    Physical Therapy Progress Note     Name: Ivelisse Hay  Clinic Number: [id number]  Diagnosis: No diagnosis found.  Physician: Raad Rodriguez*  Treatment Orders: PT Evaluation and Treatment  Past Medical History:   Diagnosis Date    Diarrhea     Gastritis     GERD (gastroesophageal reflux disease)     HLD (hyperlipidemia)     Hypertension     Impaired fasting blood sugar        Precautions: standard    Evaluation date: 6/12/17  Visit #: 2/12  Authorization period expiration: 8/12/17    Subjective     Pt reports: significant back and neck pain which pt reports as "typical"    Pain Scale: before treatment: 7 or 8/10 currently; after treatment: NP    Objective   Therapeutic exercise: Ivelisse Hay received therapeutic exercises to develop BLE strengthening/flexibility and core/hip stabilization for 55 minutes including:     Nu-Step: 6 min UE/LE and 4 min LE only      Supine:  Push/pull 3x3"  Bridges 3x  Isometric hip adduction 3x3"  DKTC w/ physio ball attempted but only performed limited reps secondary to LBP    Sidelying:  SLR hip abduction 2x10x3" attempted but only performed limited reps secondary to LBP  Clamshells w/ OTB 2x10x3"    Standing:  Scapular retractions 15x  Scapular retractions with YTB 15x  SLR hip flexion 2x10  Bent over SLR hip extension 2x10  Multifidus press outs with YTB 5x each side (c/o bilateral LBP post 5 reps)  Extended rows w/ YTB 2x10x3"    Manual therapy: Ivelisse Hay received the following manual therapy techniques for 0 minutes: np    Modalities: Ivelisse Hay did not receive any modalities during today's tx session.        Written Home Exercises Provided:   Pt demo good understanding of the education provided during the initial evaluation. Ivelisse Hay demonstrated good return demonstration of activities.      Pt. education:  · Posture reeducation, body mechanics, HEP,activity " modification/avoidance  · No spiritual or educational barriers to learning provided  · Pt has no cultural, educational or language barriers to learning provided.    Assessment   Pt arrived with significant lumbar and cervical spine pain.  Various exercises were altered or not completed secondary to LBP/cervical pain. Pt reported  difficulty performing therapeutic exercises in supine and could not perform any exercises in prone. Will progress as tolerated.  Pt will continue to benefit from skilled outpatient physical therapy to address the remaining functional deficits, provide pt/family education, and to maximize pt's level of independence in the home and community environment.      Anticipated barriers to physical therapy: None    Medical necessity is demonstrated by the following  IMPAIRMENTS:  - poor posture  - pain  - decreased flexibility  - decreased muscle strength  - poor cardiovascular conditioning  - impaired function  - decreased work ability     GOALS:  8 weeks (8/12/17). Pt agrees with goals set.  1. Independent with HEP.  2. Report decreased cervical pain < or =  4/10 with adls such as looking up, looking down, driving, turning head, prolonged standing, and prolonged sitting.    3. Increased MMT for B UE by 1 muscle grade to promote proper scapular stabilization to decrease cervical pain < or =  4/10 with adls such as looking up, looking down, driving, turning head, prolonged standing, and prolonged sitting.   4. Report decreased lumbar pain < or =  4/10 with adls such as looking up, looking down, driving, turning head, prolonged standing, prolonged sitting, and increased walking distances.   5. Increased MMT for B LE by 1 muscle grade to promote proper pelvic stability to decrease lumbar pain < or =  4/10 with adls such as looking up, looking down, driving, turning head, prolonged standing, prolonged sitting, and increased walking distances.      6. Increased flexibility in B hip adductors, B  piriformis, B hip flexors, B IT bands, and B quads to promote proper pelvic stability to decrease lumbar pain < or =  4/10 with adls such as looking up, looking down, driving, turning head, prolonged standing, prolonged sitting, and increased walking distances.    7. Patient to achieve CK (at least 40% < 60% impaired, limited or restricted) level on the FOTO Outcomes Measurement System.    · Pt's spiritual, cultural and educational needs considered and pt agreeable to plan of care and goals as stated below:        Plan   Outpatient physical therapy 2 times weekly to include: pt ed, hep, therapeutic exercises, neuromuscular re-education/ balance exercises, joint mobilizations, and modalities prn. Pt may be seen by PTA to carry out plan of care.

## 2017-06-23 ENCOUNTER — CLINICAL SUPPORT (OUTPATIENT)
Dept: REHABILITATION | Facility: HOSPITAL | Age: 57
End: 2017-06-23
Attending: SURGERY
Payer: MEDICARE

## 2017-06-23 DIAGNOSIS — G89.29 CHRONIC RIGHT-SIDED LOW BACK PAIN WITH RIGHT-SIDED SCIATICA: ICD-10-CM

## 2017-06-23 DIAGNOSIS — G89.29 CHRONIC NECK PAIN: ICD-10-CM

## 2017-06-23 DIAGNOSIS — M54.2 CHRONIC NECK PAIN: ICD-10-CM

## 2017-06-23 DIAGNOSIS — M54.41 CHRONIC RIGHT-SIDED LOW BACK PAIN WITH RIGHT-SIDED SCIATICA: ICD-10-CM

## 2017-06-23 PROCEDURE — 97110 THERAPEUTIC EXERCISES: CPT

## 2017-06-23 NOTE — PROGRESS NOTES
"Name: Ivelisse Hay  Clinic Number: [id number]  Date of Treatment: 6/23/2017  Diagnosis:     ICD-10-CM ICD-9-CM    1. Chronic right-sided low back pain with right-sided sciatica M54.41 724.2     G89.29 724.3      338.29    2. Chronic neck pain M54.2 723.1     G89.29 338.29      Evaluation date: 6/12/17  Visit #: 3/12  Authorization period expiration: 8/12/17  Subjective:    Ivelisse Hay reports increased pain.  Patient reports their pain to be 7/10 on a 0-10 scale with 0 being no pain and 10 being the worst pain imaginable.    Objective:  Patient was educated and performed therapy to increase strength, flexibility, posture and core stabilization with activities as follows:     Ivelisse Hay was educated and performed therapeutic exercises to develop strength, flexibility, posture and core stabilization for 47 total minutes including:     One on one ther ex x 25 minutes    Supine:  Push/pull 3x3"  Bridges 3x  Isometric hip adduction x 5"  Abdominal bracing 10 x 10"  TA with knee fall outs 10 x 10"    Sidelying:  SLR hip abduction 2x10x3"   Clamshells w/ OTB 2x10x3"    Standing:  Scapular retractions 15x  Scapular retractions with YTB 15x  SLR hip flexion 2x10  Bent over SLR hip extension 2x10  Multifidus press outs with YTB 5x each side (c/o bilateral LBP post 5 reps)  Extended rows w/ YTB 2x10x3"    Moist heat to low back x 5'    Written Home Exercises Provided:   No new HEP given today.      Assessment:   Continues to experience increased low back and neck pain. Discussed the importance of push/pull twice a day. Will continue next visit with scapular stabilization. May proceed with taping to assist with support and decreased neck pain.    Pt will continue to benefit from skilled PT intervention. Medical Necessity is demonstrated by:  Pain limits function of effected part for some activities, Unable to participate fully in daily activities, Requires skilled supervision to complete and progress HEP and " Weakness.    Patient is making good progress towards established goals.    New/Revised goals: continue with current goals  GOALS:  8 weeks (8/12/17). Pt agrees with goals set.  1. Independent with HEP.  2. Report decreased cervical pain < or =  4/10 with adls such as looking up, looking down, driving, turning head, prolonged standing, and prolonged sitting.    3. Increased MMT for B UE by 1 muscle grade to promote proper scapular stabilization to decrease cervical pain < or =  4/10 with adls such as looking up, looking down, driving, turning head, prolonged standing, and prolonged sitting.   4. Report decreased lumbar pain < or =  4/10 with adls such as looking up, looking down, driving, turning head, prolonged standing, prolonged sitting, and increased walking distances.   5. Increased MMT for B LE by 1 muscle grade to promote proper pelvic stability to decrease lumbar pain < or =  4/10 with adls such as looking up, looking down, driving, turning head, prolonged standing, prolonged sitting, and increased walking distances.      6. Increased flexibility in B hip adductors, B piriformis, B hip flexors, B IT bands, and B quads to promote proper pelvic stability to decrease lumbar pain < or =  4/10 with adls such as looking up, looking down, driving, turning head, prolonged standing, prolonged sitting, and increased walking distances.    7. Patient to achieve CK (at least 40% < 60% impaired, limited or restricted) level on the FOTO Outcomes Measurement System.      Plan:  Continue with established Plan of Care towards PT goals.

## 2017-06-27 ENCOUNTER — CLINICAL SUPPORT (OUTPATIENT)
Dept: REHABILITATION | Facility: HOSPITAL | Age: 57
End: 2017-06-27
Attending: SURGERY
Payer: MEDICARE

## 2017-06-27 DIAGNOSIS — M54.40 CHRONIC LOW BACK PAIN WITH SCIATICA, SCIATICA LATERALITY UNSPECIFIED, UNSPECIFIED BACK PAIN LATERALITY: ICD-10-CM

## 2017-06-27 DIAGNOSIS — G89.29 CHRONIC LOW BACK PAIN WITH SCIATICA, SCIATICA LATERALITY UNSPECIFIED, UNSPECIFIED BACK PAIN LATERALITY: ICD-10-CM

## 2017-06-27 DIAGNOSIS — M54.2 CHRONIC NECK PAIN: ICD-10-CM

## 2017-06-27 DIAGNOSIS — G89.29 CHRONIC NECK PAIN: ICD-10-CM

## 2017-06-27 PROCEDURE — 97140 MANUAL THERAPY 1/> REGIONS: CPT

## 2017-06-27 PROCEDURE — 97110 THERAPEUTIC EXERCISES: CPT

## 2017-06-27 NOTE — PROGRESS NOTES
"Name: Ivelisse Hay  Clinic Number: [id number]  Date of Treatment: 6/27/2017  Diagnosis:     ICD-10-CM ICD-9-CM    1. Chronic neck pain M54.2 723.1     G89.29 338.29    2. Chronic low back pain with sciatica, sciatica laterality unspecified, unspecified back pain laterality M54.40 724.2     G89.29 724.3      338.29      Evaluation date: 6/12/17  Visit #: 4/12  Authorization period expiration: 8/12/17    Subjective: Ivelisse Hay reports no significant change in her pain. States her pain has always been at a 7 or 8 level, but she just continues to do what she needs to do..  Patient reports their pain to be 7/10 on a 0-10 scale with 0 being no pain and 10 being the worst pain imaginable.    Objective: arrived to therapy with proper pelvic alignment.  Patient was educated and performed therapy to increase strength, flexibility, posture and core stabilization with activities as follows:     Ivelisse Hay was educated and performed therapeutic exercises to develop strength, flexibility, posture and core stabilization for 45 total minutes including:     One on one ther ex x 30 minutes    Supine:  Snow angles on towel roll x 20 reps  Chicken wings on towel roll x 20 reps  Butterflies on towel roll x 20 reps  Chin ticks 10 x 5"      Push/pull 3 x 3" NP  Bridges 3x NP  Isometric hip adduction x 5" NP  Abdominal bracing 10 x 10" NP  TA with knee fall outs 10 x 10" NP     Sidelying:  SLR hip abduction 2x10x3" NP  Clamshells w/ OTB 2x10x3" NP    Standing:  W wall slides 2 x 10 reps  Lower trap lift offs 2 x 10 reps  Lat pull downs x 20 reps with OTB  Scapular retractions with OTB 20 reps  Shoulder extensions x 20 reps with OTB    SLR hip flexion 2x10 NP  Bent over SLR hip extension 2x10 NP  Multifidus press outs with YTB 5x each side (c/o bilateral LBP post 5 reps) NP  Extended rows w/ YTB 2x10x3" NP     Manual therapy x 15':  Suboccipital release  Scalene release   SCM release 1st rib mobilizations   levator " scapulae stretches  Scalene stretches  Upper trap strain/counterstrain    Written Home Exercises Provided: HEP2Go Code: [id number]  Chin tucks  Bent rows    Pt demo good understanding of the education provided. Ivelisse Hay demonstrated good return demonstration of activities.       Assessment:   Pt will continue to benefit from skilled PT intervention. Medical Necessity is demonstrated by:  Pain limits function of effected part for some activities, Unable to participate fully in daily activities, Requires skilled supervision to complete and progress HEP and Weakness.    Patient is making good progress towards established goals.    New/Revised goals: continue with current goals  GOALS:  8 weeks (8/12/17). Pt agrees with goals set.  1. Independent with HEP.  2. Report decreased cervical pain < or =  4/10 with adls such as looking up, looking down, driving, turning head, prolonged standing, and prolonged sitting.    3. Increased MMT for B UE by 1 muscle grade to promote proper scapular stabilization to decrease cervical pain < or =  4/10 with adls such as looking up, looking down, driving, turning head, prolonged standing, and prolonged sitting.   4. Report decreased lumbar pain < or =  4/10 with adls such as looking up, looking down, driving, turning head, prolonged standing, prolonged sitting, and increased walking distances.   5. Increased MMT for B LE by 1 muscle grade to promote proper pelvic stability to decrease lumbar pain < or =  4/10 with adls such as looking up, looking down, driving, turning head, prolonged standing, prolonged sitting, and increased walking distances.      6. Increased flexibility in B hip adductors, B piriformis, B hip flexors, B IT bands, and B quads to promote proper pelvic stability to decrease lumbar pain < or =  4/10 with adls such as looking up, looking down, driving, turning head, prolonged standing, prolonged sitting, and increased walking distances.    7. Patient to achieve CK  (at least 40% < 60% impaired, limited or restricted) level on the FOTO Outcomes Measurement System.      Plan:  Continue with established Plan of Care towards PT goals.

## 2017-06-29 ENCOUNTER — CLINICAL SUPPORT (OUTPATIENT)
Dept: REHABILITATION | Facility: HOSPITAL | Age: 57
End: 2017-06-29
Attending: SURGERY
Payer: MEDICARE

## 2017-06-29 DIAGNOSIS — G89.29 CHRONIC NECK PAIN: ICD-10-CM

## 2017-06-29 DIAGNOSIS — M54.40 CHRONIC LOW BACK PAIN WITH SCIATICA, SCIATICA LATERALITY UNSPECIFIED, UNSPECIFIED BACK PAIN LATERALITY: ICD-10-CM

## 2017-06-29 DIAGNOSIS — M54.2 CHRONIC NECK PAIN: ICD-10-CM

## 2017-06-29 DIAGNOSIS — G89.29 CHRONIC LOW BACK PAIN WITH SCIATICA, SCIATICA LATERALITY UNSPECIFIED, UNSPECIFIED BACK PAIN LATERALITY: ICD-10-CM

## 2017-06-29 PROCEDURE — G8982 BODY POS GOAL STATUS: HCPCS | Mod: CK

## 2017-06-29 PROCEDURE — G8981 BODY POS CURRENT STATUS: HCPCS | Mod: CL

## 2017-06-29 PROCEDURE — 97110 THERAPEUTIC EXERCISES: CPT

## 2017-06-29 NOTE — PROGRESS NOTES
"Name: Ivelisse Hay  Clinic Number: [id number]  Date of Treatment: 6/29/2017  Diagnosis:     ICD-10-CM ICD-9-CM    1. Chronic neck pain M54.2 723.1     G89.29 338.29    2. Chronic low back pain with sciatica, sciatica laterality unspecified, unspecified back pain laterality M54.40 724.2     G89.29 724.3      338.29      Evaluation date: 6/12/17  Visit #: 5/12  Authorization period expiration: 8/12/17    Subjective: Ivelisse Hay reports no significant change in her pain. Patient reports their pain to be 8/10 on a 0-10 scale with 0 being no pain and 10 being the worst pain imaginable.    Objective: arrived to therapy with proper pelvic alignment.  Patient was educated and performed therapy to increase strength, flexibility, posture and core stabilization with activities as follows:     Ivelisse Hay was educated and performed therapeutic exercises to develop strength, flexibility, posture and core stabilization for 62 total minutes including:     One on one ther ex x 26 minutes    Supine:  Snow angles on towel roll x 20 reps  Chicken wings on towel roll x 20 reps  Butterflies on towel roll x 20 reps  Chin tucks 20 x 5"  Scapular punches 15 reps with 2#      Push/pull 3 x 3" NP  Bridges 3x NP  Isometric hip adduction x 5" NP  Abdominal bracing 10 x 10" NP  TA with knee fall outs 10 x 10" NP     Sidelying:  Shoulder hor abd x 15 reps  Shoulder flex x 15 reps  Shoulder abd to 90 deg x 15 reps    Standing:  W wall slides 2 x 10 reps, NP  Lower trap lift offs 2 x 10 reps, NP  Lat pull downs x 20 reps with OTB  Scapular retractions with OTB 20 reps  Shoulder extensions x 20 reps with OTB    SLR hip flexion 2x10 NP  Bent over SLR hip extension 2x10 NP  Multifidus press outs with YTB 5x each side (c/o bilateral LBP post 5 reps) NP  Extended rows w/ YTB 2x10x3" NP     Manual therapy x 15': NP  Suboccipital release  Scalene release   SCM release 1st rib mobilizations   levator scapulae stretches  Scalene " "stretches  Upper trap strain/counterstrain      Functional Limitations  Reports - G Codes    Category:  Changing and Maintaining Body Positioning   Tool:  FOTO Outcomes Measurement System.   Score:  Patient scored out 29 of 100 on the FOTO Outcomes Measurement System.   Current:   CL at least 60% < 80% impaired, limited or restricted   Goal:   CK at least 40% < 60% impaired, limited or restricted       Patient was screened for use of kinesiotape and was cleared for use.  1. Has the patient ever had a reaction to the adhesive in bandaids? No  2. Has the patient ever uses athletic/kinesiotape in the past? No  3. Is the patient hemodynamically impaired (PE, DVT, CHF, Kidney failure)? No  4. Can the PT/PTA apply the tape to your skin? Yes    Patient was instructed on duration to wear the tape, proper drying techniques for the tape, and to remove the tape if she had any issues with it.    Patient verbalized understanding of these instructions.    PT applied "I" strip for scapular retraction bilaterally. "Y" strips B for inhibition of upper traps.      Written Home Exercises Provided: HEP2Go Code:   No new HEP given today    Pt demo good understanding of the education provided. Ivelisse Hay demonstrated good return demonstration of activities.       Assessment:   Patient continues to experience increased neck pain levels consistent to her pain levels prior to starting therapy. Continuing to focus on scapular strengthening exercises and core strengthening exercises for postural education. Utilized KT tape to decrease upper back and neck pain. Pt will continue to benefit from skilled PT intervention. Medical Necessity is demonstrated by:  Pain limits function of effected part for some activities, Unable to participate fully in daily activities, Requires skilled supervision to complete and progress HEP and Weakness.    Patient is making poor progress towards established goals.    New/Revised goals: continue " with current goals  GOALS:  8 weeks (8/12/17). Pt agrees with goals set.  1. Independent with HEP.  2. Report decreased cervical pain < or =  4/10 with adls such as looking up, looking down, driving, turning head, prolonged standing, and prolonged sitting.    3. Increased MMT for B UE by 1 muscle grade to promote proper scapular stabilization to decrease cervical pain < or =  4/10 with adls such as looking up, looking down, driving, turning head, prolonged standing, and prolonged sitting.   4. Report decreased lumbar pain < or =  4/10 with adls such as looking up, looking down, driving, turning head, prolonged standing, prolonged sitting, and increased walking distances.   5. Increased MMT for B LE by 1 muscle grade to promote proper pelvic stability to decrease lumbar pain < or =  4/10 with adls such as looking up, looking down, driving, turning head, prolonged standing, prolonged sitting, and increased walking distances.      6. Increased flexibility in B hip adductors, B piriformis, B hip flexors, B IT bands, and B quads to promote proper pelvic stability to decrease lumbar pain < or =  4/10 with adls such as looking up, looking down, driving, turning head, prolonged standing, prolonged sitting, and increased walking distances.    7. Patient to achieve CK (at least 40% < 60% impaired, limited or restricted) level on the FOTO Outcomes Measurement System.      Plan:  Continue with established Plan of Care towards PT goals.

## 2017-07-07 ENCOUNTER — CLINICAL SUPPORT (OUTPATIENT)
Dept: REHABILITATION | Facility: HOSPITAL | Age: 57
End: 2017-07-07
Attending: SURGERY
Payer: MEDICARE

## 2017-07-07 DIAGNOSIS — G89.29 CHRONIC LOW BACK PAIN WITH SCIATICA, SCIATICA LATERALITY UNSPECIFIED, UNSPECIFIED BACK PAIN LATERALITY: Primary | ICD-10-CM

## 2017-07-07 DIAGNOSIS — M54.2 CHRONIC NECK PAIN: ICD-10-CM

## 2017-07-07 DIAGNOSIS — M54.40 CHRONIC LOW BACK PAIN WITH SCIATICA, SCIATICA LATERALITY UNSPECIFIED, UNSPECIFIED BACK PAIN LATERALITY: Primary | ICD-10-CM

## 2017-07-07 DIAGNOSIS — G89.29 CHRONIC NECK PAIN: ICD-10-CM

## 2017-07-07 PROCEDURE — 97110 THERAPEUTIC EXERCISES: CPT

## 2017-07-07 NOTE — PROGRESS NOTES
"Name: Ivelisse Hay  Clinic Number: [id number]  Date of Treatment: 7/7/2017  Diagnosis:   No diagnosis found.  Evaluation date: 6/12/17  Visit #: 6/12  Authorization period expiration: 8/12/17    Subjective: Ivelisse Hay reports no significant change in her pain. Patient reports their pain to be 8/10 on a 0-10 scale with 0 being no pain and 10 being the worst pain imaginable.    Objective: arrived to therapy with proper pelvic alignment.  Patient was educated and performed therapy to increase strength, flexibility, posture and core stabilization with activities as follows:     Ivelisse Hay was educated and performed therapeutic exercises to develop strength, flexibility, posture and core stabilization for 60 total minutes including:     One on one ther ex x 30 minutes    Supine:  Snow angles on towel roll x 20 reps  Chicken wings on towel roll x 20 reps  Butterflies on towel roll x 20 reps  Chin tucks 20 x 5"  Scapular punches 15 reps with 2# np    Push/pull 3 x 3" NP  Bridges 3x NP  Isometric hip adduction x 5" NP  Abdominal bracing 10 x 10" NP  TA with knee fall outs 10 x 10" NP     Sidelying:  Shoulder hor abd x 15 reps  Shoulder flex x 15 reps  Shoulder abd to 90 deg x 15 reps    Sitting:  UT stretch 2 x 30"    Standing:  W wall slides 2 x 10 reps, NP  Lower trap lift offs 2 x 10 reps, NP  Lat pull downs x 20 reps with OTB np  Scapular retractions with OTB 20 reps  Shoulder extensions x 20 reps with OTB np  Chin tucks on wall 15 x 3"     SLR hip flexion 2x10 NP  Bent over SLR hip extension 2x10 NP  Multifidus press outs with YTB 5x each side (c/o bilateral LBP post 5 reps) NP  Extended rows w/ YTB 2x10x3" NP     Manual therapy x 15': NP  Suboccipital release  Scalene release   SCM release 1st rib mobilizations   levator scapulae stretches  Scalene stretches  Upper trap strain/counterstrain      Functional Limitations  Reports - G Codes    Category:  Changing and Maintaining Body Positioning   Tool:  " "FOTO Outcomes Measurement System.   Score:  Patient scored out 29 of 100 on the FOTO Outcomes Measurement System.   Current:   CL at least 60% < 80% impaired, limited or restricted   Goal:   CK at least 40% < 60% impaired, limited or restricted       Patient was screened for use of kinesiotape and was cleared for use. NP  1. Has the patient ever had a reaction to the adhesive in bandaids? No  2. Has the patient ever uses athletic/kinesiotape in the past? No  3. Is the patient hemodynamically impaired (PE, DVT, CHF, Kidney failure)? No  4. Can the PT/PTA apply the tape to your skin? Yes    Patient was instructed on duration to wear the tape, proper drying techniques for the tape, and to remove the tape if she had any issues with it.    Patient verbalized understanding of these instructions.    PT applied "I" strip for scapular retraction bilaterally. "Y" strips B for inhibition of upper traps. Not performed      Written Home Exercises Provided: HEP2Go Code:   No new HEP given today    Pt demo good understanding of the education provided. Ivelisse Hay demonstrated good return demonstration of activities.       Assessment:   Patient continues to experience increased neck pain levels consistent to her pain levels prior to starting therapy. Continuing to focus on scapular strengthening exercises and core strengthening exercises for postural education. It was noted that a lipoma like structure was discovered in R upper in the medial border of R scapula. PT, Kathleen White, examined structure and instructed her to have PCP examine it during her next scheduled visit next month.      Pt will continue to benefit from skilled PT intervention. Medical Necessity is demonstrated by:  Pain limits function of effected part for some activities, Unable to participate fully in daily activities, Requires skilled supervision to complete and progress HEP and Weakness.    Patient is making poor progress towards " established goals.    New/Revised goals: continue with current goals  GOALS:  8 weeks (8/12/17). Pt agrees with goals set.  1. Independent with HEP.  2. Report decreased cervical pain < or =  4/10 with adls such as looking up, looking down, driving, turning head, prolonged standing, and prolonged sitting.    3. Increased MMT for B UE by 1 muscle grade to promote proper scapular stabilization to decrease cervical pain < or =  4/10 with adls such as looking up, looking down, driving, turning head, prolonged standing, and prolonged sitting.   4. Report decreased lumbar pain < or =  4/10 with adls such as looking up, looking down, driving, turning head, prolonged standing, prolonged sitting, and increased walking distances.   5. Increased MMT for B LE by 1 muscle grade to promote proper pelvic stability to decrease lumbar pain < or =  4/10 with adls such as looking up, looking down, driving, turning head, prolonged standing, prolonged sitting, and increased walking distances.      6. Increased flexibility in B hip adductors, B piriformis, B hip flexors, B IT bands, and B quads to promote proper pelvic stability to decrease lumbar pain < or =  4/10 with adls such as looking up, looking down, driving, turning head, prolonged standing, prolonged sitting, and increased walking distances.    7. Patient to achieve CK (at least 40% < 60% impaired, limited or restricted) level on the FOTO Outcomes Measurement System.      Plan:  Continue with established Plan of Care towards PT goals.

## 2017-07-10 ENCOUNTER — TELEPHONE (OUTPATIENT)
Dept: INTERNAL MEDICINE | Facility: CLINIC | Age: 57
End: 2017-07-10

## 2017-07-10 NOTE — TELEPHONE ENCOUNTER
----- Message from Radha Dias sent at 7/10/2017 11:35 AM CDT -----  Contact: self/ [phone]  Patient need her second Hep A injection administered, please call and schedule.

## 2017-07-12 ENCOUNTER — CLINICAL SUPPORT (OUTPATIENT)
Dept: REHABILITATION | Facility: HOSPITAL | Age: 57
End: 2017-07-12
Attending: SURGERY
Payer: MEDICARE

## 2017-07-12 DIAGNOSIS — M54.2 CHRONIC NECK PAIN: ICD-10-CM

## 2017-07-12 DIAGNOSIS — G89.29 CHRONIC LOW BACK PAIN WITH SCIATICA, SCIATICA LATERALITY UNSPECIFIED, UNSPECIFIED BACK PAIN LATERALITY: Primary | ICD-10-CM

## 2017-07-12 DIAGNOSIS — M54.40 CHRONIC LOW BACK PAIN WITH SCIATICA, SCIATICA LATERALITY UNSPECIFIED, UNSPECIFIED BACK PAIN LATERALITY: Primary | ICD-10-CM

## 2017-07-12 DIAGNOSIS — G89.29 CHRONIC NECK PAIN: ICD-10-CM

## 2017-07-12 PROCEDURE — 97010 HOT OR COLD PACKS THERAPY: CPT

## 2017-07-12 PROCEDURE — 97140 MANUAL THERAPY 1/> REGIONS: CPT

## 2017-07-12 PROCEDURE — 97110 THERAPEUTIC EXERCISES: CPT

## 2017-07-12 NOTE — PROGRESS NOTES
"Name: Ivelisse Hay  Clinic Number: [id number]  Date of Treatment: 7/12/2017  Diagnosis:   No diagnosis found.  Evaluation date: 6/12/17  Visit #: 7/12  Authorization period expiration: 8/12/17    Subjective: Ivelisse Hay reports no significant change in her pain. Patient reports their pain to be 8/10 on a 0-10 scale with 0 being no pain and 10 being the worst pain imaginable.    Objective: arrived to therapy with proper pelvic alignment.  Patient was educated and performed therapy to increase strength, flexibility, posture and core stabilization with activities as follows:     Ivelisse Hay was educated and performed therapeutic exercises to develop strength, flexibility, posture and core stabilization for 60 total minutes including:     One on one ther ex x 30 minutes    Pt received MH in cervical and lumbar spine pre-tx for 10 min.     Supine:  Cervical rotation w/ partially deflated ball 15x  Snow angles on towel roll x 20 reps np  Chicken wings on towel roll x 20 reps np  Butterflies on towel roll x 20 reps np  Chin tucks 20 x 5" np  Scapular punches 15 reps with 2# np  Push/pull 3 x 3" NP  Bridges 3x NP  Isometric hip adduction x 5" NP  Abdominal bracing 10 x 10" NP  TA with knee fall outs 10 x 10" NP     Sidelying:  Shoulder hor abd x 15 reps np  Shoulder flex x 15 reps np  Shoulder abd to 90 deg x 15 reps np    Sitting:  UT stretch 2 x 30"  Levator scap 2 x 30"    Standing:  Cervical rotation w/ partially deflated ball 15x  W wall slides 2 x 10 reps  Chin tucks on wall 20 x 3"   Scapular retractions 15x w/ verbal cues for upper trap deactivation  Lower trap lift offs 2 x 10 reps, NP  Lat pull downs x 20 reps with OTB np  Scapular retractions with OTB 20 reps np  Shoulder extensions x 20 reps with OTB np  SLR hip flexion 2x10 NP  Bent over SLR hip extension 2x10 NP  Multifidus press outs with YTB 5x each side (c/o bilateral LBP post 5 reps) NP  Extended rows w/ YTB 2x10x3" NP    Manual therapy: x " "15':   STM upper traps and levator scap  Suboccipital release np  Scalene release np  SCM release 1st rib mobilizations np   levator scapulae stretches np  Scalene stretches np  Upper trap strain/counterstrain np      Functional Limitations  Reports - G Codes    Category:  Changing and Maintaining Body Positioning   Tool:  FOTO Outcomes Measurement System.   Score:  Patient scored out 29 of 100 on the FOTO Outcomes Measurement System.   Current:   CL at least 60% < 80% impaired, limited or restricted   Goal:   CK at least 40% < 60% impaired, limited or restricted       Patient was screened for use of kinesiotape and was cleared for use. NP  1. Has the patient ever had a reaction to the adhesive in bandaids? No  2. Has the patient ever uses athletic/kinesiotape in the past? No  3. Is the patient hemodynamically impaired (PE, DVT, CHF, Kidney failure)? No  4. Can the PT/PTA apply the tape to your skin? Yes    Patient was instructed on duration to wear the tape, proper drying techniques for the tape, and to remove the tape if she had any issues with it.    Patient verbalized understanding of these instructions.    PT applied "I" strip for scapular retraction bilaterally. "Y" strips B for inhibition of upper traps. Not performed      Written Home Exercises Provided: HEP2Go Code:   No new HEP given today    Pt demo good understanding of the education provided. Ivelisse Hay demonstrated good return demonstration of activities.       Assessment:   Patient continues to experience increased neck pain levels consistent to her pain levels prior to starting therapy. Many exercises were not performed during today's tx session as today's tx focused on cervical flexibility/ROM and posture correction. Pt educated on posture correction awareness when performing activities of daily living. Pt will continue to benefit from skilled PT intervention. Medical Necessity is demonstrated by:  Pain limits function of effected " part for some activities, Unable to participate fully in daily activities, Requires skilled supervision to complete and progress HEP and Weakness.    Patient is making poor progress towards established goals.    New/Revised goals: continue with current goals  GOALS:  8 weeks (8/12/17). Pt agrees with goals set.  1. Independent with HEP.  2. Report decreased cervical pain < or =  4/10 with adls such as looking up, looking down, driving, turning head, prolonged standing, and prolonged sitting.    3. Increased MMT for B UE by 1 muscle grade to promote proper scapular stabilization to decrease cervical pain < or =  4/10 with adls such as looking up, looking down, driving, turning head, prolonged standing, and prolonged sitting.   4. Report decreased lumbar pain < or =  4/10 with adls such as looking up, looking down, driving, turning head, prolonged standing, prolonged sitting, and increased walking distances.   5. Increased MMT for B LE by 1 muscle grade to promote proper pelvic stability to decrease lumbar pain < or =  4/10 with adls such as looking up, looking down, driving, turning head, prolonged standing, prolonged sitting, and increased walking distances.      6. Increased flexibility in B hip adductors, B piriformis, B hip flexors, B IT bands, and B quads to promote proper pelvic stability to decrease lumbar pain < or =  4/10 with adls such as looking up, looking down, driving, turning head, prolonged standing, prolonged sitting, and increased walking distances.    7. Patient to achieve CK (at least 40% < 60% impaired, limited or restricted) level on the FOTO Outcomes Measurement System.      Plan:  Continue with established Plan of Care towards PT goals.

## 2017-07-14 ENCOUNTER — CLINICAL SUPPORT (OUTPATIENT)
Dept: INTERNAL MEDICINE | Facility: CLINIC | Age: 57
End: 2017-07-14
Payer: MEDICARE

## 2017-07-14 ENCOUNTER — CLINICAL SUPPORT (OUTPATIENT)
Dept: REHABILITATION | Facility: HOSPITAL | Age: 57
End: 2017-07-14
Attending: SURGERY
Payer: MEDICARE

## 2017-07-14 DIAGNOSIS — G89.29 CHRONIC LOW BACK PAIN WITH SCIATICA, SCIATICA LATERALITY UNSPECIFIED, UNSPECIFIED BACK PAIN LATERALITY: Primary | ICD-10-CM

## 2017-07-14 DIAGNOSIS — M54.2 CHRONIC NECK PAIN: ICD-10-CM

## 2017-07-14 DIAGNOSIS — G89.29 CHRONIC NECK PAIN: ICD-10-CM

## 2017-07-14 DIAGNOSIS — M54.40 CHRONIC LOW BACK PAIN WITH SCIATICA, SCIATICA LATERALITY UNSPECIFIED, UNSPECIFIED BACK PAIN LATERALITY: Primary | ICD-10-CM

## 2017-07-14 PROCEDURE — 90632 HEPA VACCINE ADULT IM: CPT | Mod: S$GLB,,, | Performed by: INTERNAL MEDICINE

## 2017-07-14 PROCEDURE — 97110 THERAPEUTIC EXERCISES: CPT

## 2017-07-14 PROCEDURE — 97140 MANUAL THERAPY 1/> REGIONS: CPT

## 2017-07-14 PROCEDURE — 90471 IMMUNIZATION ADMIN: CPT | Mod: S$GLB,,, | Performed by: INTERNAL MEDICINE

## 2017-07-14 NOTE — PROGRESS NOTES
"Name: Ivelisse Hay  Clinic Number: [id number]  Date of Treatment: 7/14/2017  Diagnosis:     ICD-10-CM ICD-9-CM    1. Chronic neck pain M54.2 723.1     G89.29 338.29      Evaluation date: 6/12/17  Visit #: 8/12  Authorization period expiration: 8/12/17    Subjective: Ivelisse Hay reports no significant change in her pain but is becoming more aware of her posture. Patient reports their pain to be 8/10 on a 0-10 scale with 0 being no pain and 10 being the worst pain imaginable.    Objective: arrived to therapy with proper pelvic alignment.  Patient was educated and performed therapy to increase strength, flexibility, posture and core stabilization with activities as follows:     Ivelisse Hay was educated and performed therapeutic exercises to develop strength, flexibility, posture and core stabilization for 60 total minutes including:     One on one ther ex x 30 minutes    Pt received MH in cervical and lumbar spine pre-tx for 10 min. np     Supine:  Snow angles on towel roll x 20 reps np  Chicken wings on towel roll x 20 reps np  Butterflies on towel roll x 20 reps np  Chin tucks 20 x 5" np  Scapular punches 15 reps with 2# np  Push/pull 3 x 3" NP    Bridges 3x NP  Isometric hip adduction x 5" NP  Abdominal bracing 10 x 10" NP  TA with knee fall outs 10 x 10" NP     Sidelying:  Shoulder hor abd x 15 reps   Shoulder flex x 15 reps   Shoulder abd to 90 deg x 15 reps np    Sitting:  UT stretch 2 x 30"  Levator scap 2 x 30"    Standing:  Cervical rotation w/ partially deflated ball 15x  W wall slides 2 x 10 reps  Chin tucks on wall 20 x 3"   Scapular retractions 15x w/ verbal cues for upper trap deactivation  Lower trap lift offs 2 x 10 reps, NP  Lat pull downs x 20 reps with OTB np  Scapular retractions with OTB 20 reps np  Shoulder extensions x 20 reps with OTB np  SLR hip flexion 2x10 NP  Bent over SLR hip extension 2x10 NP  Multifidus press outs with YTB 5x each side (c/o bilateral LBP post 5 reps) " "NP  Extended rows w/ YTB 2x10x3" NP    Prone:  Rows  w/ verbal cues for scapular depression/retraction 15 x  Shoulder extensions w/ verbal cues for scapular depression/retraction 15 x    Manual therapy: x 10':   STM upper traps and levator scap  Suboccipital release np  Scalene release np  SCM release 1st rib mobilizations np   levator scapulae stretches np  Scalene stretches np  Upper trap strain/counterstrain np      Functional Limitations  Reports - G Codes    Category:  Changing and Maintaining Body Positioning   Tool:  FOTO Outcomes Measurement System.   Score:  Patient scored out 29 of 100 on the FOTO Outcomes Measurement System.   Current:   CL at least 60% < 80% impaired, limited or restricted   Goal:   CK at least 40% < 60% impaired, limited or restricted       Patient was screened for use of kinesiotape and was cleared for use. NP  1. Has the patient ever had a reaction to the adhesive in bandaids? No  2. Has the patient ever uses athletic/kinesiotape in the past? No  3. Is the patient hemodynamically impaired (PE, DVT, CHF, Kidney failure)? No  4. Can the PT/PTA apply the tape to your skin? Yes    Patient was instructed on duration to wear the tape, proper drying techniques for the tape, and to remove the tape if she had any issues with it.    Patient verbalized understanding of these instructions.    PT applied "I" strip for scapular retraction bilaterally. "Y" strips B for inhibition of upper traps. Not performed      Written Home Exercises Provided: HEP2Go Code:   No new HEP given today    Pt demo good understanding of the education provided. Ivelisse Hay demonstrated good return demonstration of activities.       Assessment:   Patient continues to experience increased neck pain levels consistent to her pain levels prior to starting therapy.  Tx focused on cervical flexibility/ROM, posture correction and radha-scapular musculature activation/strengthening. It was noted that pt reported " becoming increasingly aware of her posture.      Pt educated on posture correction awareness when performing activities of daily living. Pt will continue to benefit from skilled PT intervention. Medical Necessity is demonstrated by:  Pain limits function of effected part for some activities, Unable to participate fully in daily activities, Requires skilled supervision to complete and progress HEP and Weakness.    Patient is making poor progress towards established goals.    New/Revised goals: continue with current goals  GOALS:  8 weeks (8/12/17). Pt agrees with goals set.  1. Independent with HEP.  2. Report decreased cervical pain < or =  4/10 with adls such as looking up, looking down, driving, turning head, prolonged standing, and prolonged sitting.    3. Increased MMT for B UE by 1 muscle grade to promote proper scapular stabilization to decrease cervical pain < or =  4/10 with adls such as looking up, looking down, driving, turning head, prolonged standing, and prolonged sitting.   4. Report decreased lumbar pain < or =  4/10 with adls such as looking up, looking down, driving, turning head, prolonged standing, prolonged sitting, and increased walking distances.   5. Increased MMT for B LE by 1 muscle grade to promote proper pelvic stability to decrease lumbar pain < or =  4/10 with adls such as looking up, looking down, driving, turning head, prolonged standing, prolonged sitting, and increased walking distances.      6. Increased flexibility in B hip adductors, B piriformis, B hip flexors, B IT bands, and B quads to promote proper pelvic stability to decrease lumbar pain < or =  4/10 with adls such as looking up, looking down, driving, turning head, prolonged standing, prolonged sitting, and increased walking distances.    7. Patient to achieve CK (at least 40% < 60% impaired, limited or restricted) level on the FOTO Outcomes Measurement System.      Plan:  Continue with established Plan of Care towards PT  goals.

## 2017-07-19 ENCOUNTER — CLINICAL SUPPORT (OUTPATIENT)
Dept: REHABILITATION | Facility: HOSPITAL | Age: 57
End: 2017-07-19
Attending: SURGERY
Payer: MEDICARE

## 2017-07-19 DIAGNOSIS — G89.29 CHRONIC NECK PAIN: ICD-10-CM

## 2017-07-19 DIAGNOSIS — M54.2 CHRONIC NECK PAIN: ICD-10-CM

## 2017-07-19 PROCEDURE — 97110 THERAPEUTIC EXERCISES: CPT

## 2017-07-19 NOTE — PROGRESS NOTES
"Name: Ivelisse Hay  Clinic Number: [id number]  Date of Treatment: 7/19/2017  Diagnosis:     ICD-10-CM ICD-9-CM    1. Chronic neck pain M54.2 723.1     G89.29 338.29      Evaluation date: 6/12/17  Visit #: 9/12  Authorization period expiration: 8/12/17    Subjective: Ivelisse Hay continues to report increased neck and back pain. Patient reports their pain to be 8/10 on a 0-10 scale with 0 being no pain and 10 being the worst pain imaginable. States the KT tape was not beneficial for her. States she had to remove it the following day after application.    Objective: arrived to therapy with proper pelvic alignment.  Patient was educated and performed therapy to increase strength, flexibility, posture and core stabilization with activities as follows:     Ivelisse Hay was educated and performed therapeutic exercises to develop strength, flexibility, posture and core stabilization for 45 total minutes including:     One on one ther ex x 25 minutes     Chin tucks 20 x 5"  Scapular punches 20 reps with 2#   Plank plus (modified) 10 reps  Seated reach, roll, and lifts 2 x 10 reps  Side lying shoulder hor abd 2 x 10 reps with 1#  Side lying shoulder flex 2 x 10 reps with 1#  Side lying shoulder abd to 90 deg 2 x 10 reps with 1#    Bridges 30 rep    Isometric hip adduction x 5" NP  Abdominal bracing 10 x 10" NP  TA with knee fall outs 10 x 10" NP         Written Home Exercises Provided: HEP2 Code: [id number]    Bird dogs  Seated reach, roll, and lifts  Pt demo good understanding of the education provided. Ivelisse Hay demonstrated good return demonstration of activities.       Assessment:   Continues to experience increased neck and low back pain. Discussed patient using the elliptical machine instead of the treadmill to reduce impact and stress on low back.      Pt educated on posture correction awareness when performing activities of daily living. Pt will continue to benefit from skilled PT intervention. " Medical Necessity is demonstrated by:  Pain limits function of effected part for some activities, Unable to participate fully in daily activities, Requires skilled supervision to complete and progress HEP and Weakness.    Patient is making poor progress towards established goals.    New/Revised goals: continue with current goals  GOALS:  8 weeks (8/12/17). Pt agrees with goals set.  1. Independent with HEP.  2. Report decreased cervical pain < or =  4/10 with adls such as looking up, looking down, driving, turning head, prolonged standing, and prolonged sitting.    3. Increased MMT for B UE by 1 muscle grade to promote proper scapular stabilization to decrease cervical pain < or =  4/10 with adls such as looking up, looking down, driving, turning head, prolonged standing, and prolonged sitting.   4. Report decreased lumbar pain < or =  4/10 with adls such as looking up, looking down, driving, turning head, prolonged standing, prolonged sitting, and increased walking distances.   5. Increased MMT for B LE by 1 muscle grade to promote proper pelvic stability to decrease lumbar pain < or =  4/10 with adls such as looking up, looking down, driving, turning head, prolonged standing, prolonged sitting, and increased walking distances.      6. Increased flexibility in B hip adductors, B piriformis, B hip flexors, B IT bands, and B quads to promote proper pelvic stability to decrease lumbar pain < or =  4/10 with adls such as looking up, looking down, driving, turning head, prolonged standing, prolonged sitting, and increased walking distances.    7. Patient to achieve CK (at least 40% < 60% impaired, limited or restricted) level on the FOTO Outcomes Measurement System.      Plan:  Continue with established Plan of Care towards PT goals.

## 2017-07-21 ENCOUNTER — CLINICAL SUPPORT (OUTPATIENT)
Dept: REHABILITATION | Facility: HOSPITAL | Age: 57
End: 2017-07-21
Attending: SURGERY
Payer: MEDICARE

## 2017-07-21 DIAGNOSIS — M54.40 CHRONIC LOW BACK PAIN WITH SCIATICA, SCIATICA LATERALITY UNSPECIFIED, UNSPECIFIED BACK PAIN LATERALITY: ICD-10-CM

## 2017-07-21 DIAGNOSIS — G89.29 CHRONIC LOW BACK PAIN WITH SCIATICA, SCIATICA LATERALITY UNSPECIFIED, UNSPECIFIED BACK PAIN LATERALITY: ICD-10-CM

## 2017-07-21 DIAGNOSIS — G89.29 CHRONIC NECK PAIN: ICD-10-CM

## 2017-07-21 DIAGNOSIS — M54.2 CHRONIC NECK PAIN: ICD-10-CM

## 2017-07-21 PROCEDURE — 97140 MANUAL THERAPY 1/> REGIONS: CPT

## 2017-07-21 PROCEDURE — 97110 THERAPEUTIC EXERCISES: CPT

## 2017-07-21 PROCEDURE — G8985 CARRY GOAL STATUS: HCPCS | Mod: CK

## 2017-07-21 PROCEDURE — G8986 CARRY D/C STATUS: HCPCS | Mod: CL

## 2017-07-21 NOTE — PROGRESS NOTES
Name: Ivelisse Hay   Clinic Number: [id number]   Age: 56 y.o.   Diagnosis:   Encounter Diagnoses   Name Primary?    Chronic low back pain with sciatica, sciatica laterality unspecified, unspecified back pain laterality     Chronic neck pain       Physician: Raad Rodriguez*   Original Orders : PT eval and treat  Initial visit: 6/12/17  Date of Last visit: 7/21/2017  Date of Discharge Note:  7/21/2017  Total Visits Received: 10  Missed Visits: 0    Subjective: reports her neck and low back pain is 8 out of 10. reports she was performing her neck stretches and felt [pain in her upper traps.    Objective:Patient is a 57 yo F referred to OP PT secondary neck and back pain.   Treatment :    Included:Therapeutic exercise, Soft tissue mobilizations, Moist heat, Stretching and core strengthening exercises.           Cervical AROM     flexion 25 deg   extension 40 deg   R rotation 42 deg   L rotation 45 deg   R side bending 16 deg   L side bending 12 deg      UE MMT R L   C3 Cervical side bend 5/5 5/5   C4 Shoulder Shrug 5/5  5/5   Shoulder flexion 5/5 5/5   Shoulder abduction 4/5 5/5   Shoulder IR 5/5 5/5   Shoulder ER 5/5 5/5   C5 Elbow flexion 5/5 5/5   C7 Elbow extension 5/5 5/5   C6 Wrist extension 5/5 5/5   Wrist flexion 5/5 5/5   Scapular protraction 5/5 5/5   Mid Traps 4/5 4/5   Lower traps 3+/5 3+/5      Flexibility testing:  - hamstrings:          B: WNL              - piriformis:             B: WNL         - quadriceps:         B: WNL         - hip flexors:           B: tight, decreased 25%  - hip adductors:     B: WFL  - IT bands:            B: WFL     MMT R L   Hip flexion 5/5 5/5   Hip abduction 3+/5 3+/5   Hip extension 3+/5 3+/5   Hip ER 5/5 5/5   Hip IR 5/5 5/5   Knee extension 5/5 5/5   Knee flexion 5/5 5/5   Ankle dorsiflexion 5/5 5/5   Ankle plantar flexion 5/5 5/5   Ankle inversion 5/5 5/5   Ankle eversion 5/5 5/5      Lumbar Special Tests     SLR: negative   Cross SLR: negative   VARINDER B hip  "tightness   Prone Instability negative            Lumbar active range of motion in standing is:  Flexion 75 deg   Extension 30 deg   Left side bending 20 deg   Right side bending 10 deg   Left rotation Decreased 50%   Right rotation Decreased 50%         Other:   Functional Limitations  Reports - G Codes     Category:  Changing and Maintaining Body Positioning   Tool:  FOTO Outcomes Measurement System.   Score:  Patient scored out 28 of 100 on the FOTO Outcomes Measurement System.   Discharge:   CL at least 60% < 80% impaired, limited or restricted   Goal:   CK at least 40% < 60% impaired, limited or restricted        Written Home Exercises Provided: HEP2Go Code: [id number]  1st rib mobilization  Scalene stretch with strap  Levator scapula stretch  SCM stretch    Pt demo good understanding of the education provided. Ivelisse Hay demonstrated good return demonstration of activities.     Treatment today:  Ther ex x 30 minutes  Reassessed strength, flexibility, and G code.     Manual therapy x 10 minutes:  Strain/counterstrain B upper traps x 90", B levator scapulae. Positional release scalenes, positional release B SCM. 1st rib mobilization    Assessment:  Patient participated in OP PT with minimal reduction in neck and low back pain. PT instructed pt in additional neck stretches. Pt to schedule appointment for follow up with MD.    Goals Achieved:   1. Independent with HEP.  2. Report decreased cervical pain < or =  4/10 with adls such as looking up, looking down, driving, turning head, prolonged standing, and prolonged sitting.    3. Increased MMT for B UE by 1 muscle grade to promote proper scapular stabilization to decrease cervical pain < or =  4/10 with adls such as looking up, looking down, driving, turning head, prolonged standing, and prolonged sitting.   4. Report decreased lumbar pain < or =  4/10 with adls such as looking up, looking down, driving, turning head, prolonged standing, prolonged " sitting, and increased walking distances.   5. Increased MMT for B LE by 1 muscle grade to promote proper pelvic stability to decrease lumbar pain < or =  4/10 with adls such as looking up, looking down, driving, turning head, prolonged standing, prolonged sitting, and increased walking distances.      6. Increased flexibility in B hip adductors, B piriformis, B hip flexors, B IT bands, and B quads to promote proper pelvic stability to decrease lumbar pain < or =  4/10 with adls such as looking up, looking down, driving, turning head, prolonged standing, prolonged sitting, and increased walking distances.    7. Patient to achieve CK (at least 40% < 60% impaired, limited or restricted) level on the FOTO Outcomes Measurement System.    Goals Not achieved and why:   8. Independent with HEP.  9. Report decreased cervical pain < or =  4/10 with adls such as looking up, looking down, driving, turning head, prolonged standing, and prolonged sitting.    10. Increased MMT for B UE by 1 muscle grade to promote proper scapular stabilization to decrease cervical pain < or =  4/10 with adls such as looking up, looking down, driving, turning head, prolonged standing, and prolonged sitting.   11. Report decreased lumbar pain < or =  4/10 with adls such as looking up, looking down, driving, turning head, prolonged standing, prolonged sitting, and increased walking distances.   12. Increased MMT for B LE by 1 muscle grade to promote proper pelvic stability to decrease lumbar pain < or =  4/10 with adls such as looking up, looking down, driving, turning head, prolonged standing, prolonged sitting, and increased walking distances.      13. Increased flexibility in B hip adductors, B piriformis, B hip flexors, B IT bands, and B quads to promote proper pelvic stability to decrease lumbar pain < or =  4/10 with adls such as looking up, looking down, driving, turning head, prolonged standing, prolonged sitting, and increased walking  distances.    14. Patient to achieve CK (at least 40% < 60% impaired, limited or restricted) level on the FOTO Outcomes Measurement System.      Discharge reason : No benefit from P.T. noted    Discharge plan :Continue HEP and Pt to follow-up with MD as planned    Plan:  This patient is discharged from Physical Therapy Services.

## 2017-08-25 RX ORDER — LOSARTAN POTASSIUM 50 MG/1
50 TABLET ORAL DAILY
Qty: 90 TABLET | Refills: 2 | Status: SHIPPED | OUTPATIENT
Start: 2017-08-25 | End: 2018-05-24 | Stop reason: SDUPTHER

## 2017-09-12 ENCOUNTER — TELEPHONE (OUTPATIENT)
Dept: INTERNAL MEDICINE | Facility: CLINIC | Age: 57
End: 2017-09-12

## 2017-09-12 NOTE — TELEPHONE ENCOUNTER
----- Message from Anastasia Pablo sent at 9/12/2017  3:02 PM CDT -----  Contact: patient [phone]  Pt called to schedule her annual which is due after 12/10/17 . Your next available physical is not until January. Pt states that this is not acceptable for insurance reasons and she must be seen in December. I offered to check for a resident appt and pt refused . Pt would like to be fit in before the end of the year and would like to speak with Amber.

## 2017-09-12 NOTE — TELEPHONE ENCOUNTER
----- Message from Leonora Monterroso sent at 9/12/2017  3:11 PM CDT -----  Contact: self [phone]  Sooner appointment than the  can schedule.  Did you offer to schedule the next available appointment and put the patient on the wait list?: yes   When is the first available appointment: 01/10  What is the nature of the appointment: physical   What visit type: EPP  Patient preference of timeframe to be scheduled:    Comments: Pt state with new insurance she will need a physical before the year is out.

## 2017-09-13 ENCOUNTER — TELEPHONE (OUTPATIENT)
Dept: INTERNAL MEDICINE | Facility: CLINIC | Age: 57
End: 2017-09-13

## 2017-09-13 DIAGNOSIS — E78.5 HYPERLIPIDEMIA, UNSPECIFIED HYPERLIPIDEMIA TYPE: ICD-10-CM

## 2017-09-13 DIAGNOSIS — R73.01 IMPAIRED FASTING GLUCOSE: ICD-10-CM

## 2017-09-13 DIAGNOSIS — I10 UNSPECIFIED ESSENTIAL HYPERTENSION: Primary | ICD-10-CM

## 2017-09-13 NOTE — TELEPHONE ENCOUNTER
"----- Message from Aron Orona sent at 9/13/2017 12:37 PM CDT -----  Doctor appointment and lab have been scheduled.  Please link lab orders to the lab appointment.  Date of doctor appointment:  12/11/17  Physical or EP:  Physical  Date of lab appointment: 12/5/17   Comments:  #Wants to make sure that A1C is included in the lab work.   Also said to say "thank you" to Dr. Tejeda for working her in.      "

## 2017-10-11 ENCOUNTER — TELEPHONE (OUTPATIENT)
Dept: PLASTIC SURGERY | Facility: CLINIC | Age: 57
End: 2017-10-11

## 2017-10-11 NOTE — TELEPHONE ENCOUNTER
Spoke directly to the patient. She was informed that fellows and residents are utilized %100 of the time on the Plastic Surgery service.    I assured her that I would be present in the Operating Room for her surgery but that the fellows and residents do a major portion of the surgery under my supervision. My PA also is directly involved in the OR and clinic.     I was very, very clear that I would be happy to perform her surgery but that residents and fellows will definitely be in the OR and performing a portion of the surgery without a doubt.     The patient can make the decision as to how she would like to proceed.

## 2017-12-05 ENCOUNTER — LAB VISIT (OUTPATIENT)
Dept: LAB | Facility: HOSPITAL | Age: 57
End: 2017-12-05
Attending: INTERNAL MEDICINE
Payer: MEDICARE

## 2017-12-05 DIAGNOSIS — I10 ESSENTIAL HYPERTENSION: ICD-10-CM

## 2017-12-05 DIAGNOSIS — E78.5 HYPERLIPIDEMIA, UNSPECIFIED HYPERLIPIDEMIA TYPE: ICD-10-CM

## 2017-12-05 DIAGNOSIS — R73.01 IMPAIRED FASTING GLUCOSE: ICD-10-CM

## 2017-12-05 LAB
ALBUMIN SERPL BCP-MCNC: 4.2 G/DL
ALP SERPL-CCNC: 114 U/L
ALT SERPL W/O P-5'-P-CCNC: 62 U/L
ANION GAP SERPL CALC-SCNC: 11 MMOL/L
AST SERPL-CCNC: 29 U/L
BASOPHILS # BLD AUTO: 0.04 K/UL
BASOPHILS NFR BLD: 0.5 %
BILIRUB SERPL-MCNC: 0.7 MG/DL
BUN SERPL-MCNC: 15 MG/DL
CALCIUM SERPL-MCNC: 10 MG/DL
CHLORIDE SERPL-SCNC: 102 MMOL/L
CHOLEST SERPL-MCNC: 215 MG/DL
CHOLEST/HDLC SERPL: 3.4 {RATIO}
CO2 SERPL-SCNC: 28 MMOL/L
CREAT SERPL-MCNC: 0.8 MG/DL
DIFFERENTIAL METHOD: NORMAL
EOSINOPHIL # BLD AUTO: 0.2 K/UL
EOSINOPHIL NFR BLD: 3 %
ERYTHROCYTE [DISTWIDTH] IN BLOOD BY AUTOMATED COUNT: 12.4 %
EST. GFR  (AFRICAN AMERICAN): >60 ML/MIN/1.73 M^2
EST. GFR  (NON AFRICAN AMERICAN): >60 ML/MIN/1.73 M^2
GLUCOSE SERPL-MCNC: 109 MG/DL
HCT VFR BLD AUTO: 44.9 %
HDLC SERPL-MCNC: 64 MG/DL
HDLC SERPL: 29.8 %
HGB BLD-MCNC: 14.7 G/DL
IMM GRANULOCYTES # BLD AUTO: 0.02 K/UL
IMM GRANULOCYTES NFR BLD AUTO: 0.3 %
LDLC SERPL CALC-MCNC: 116.6 MG/DL
LYMPHOCYTES # BLD AUTO: 1.9 K/UL
LYMPHOCYTES NFR BLD: 23.7 %
MCH RBC QN AUTO: 29.6 PG
MCHC RBC AUTO-ENTMCNC: 32.7 G/DL
MCV RBC AUTO: 91 FL
MONOCYTES # BLD AUTO: 0.6 K/UL
MONOCYTES NFR BLD: 7.8 %
NEUTROPHILS # BLD AUTO: 5.1 K/UL
NEUTROPHILS NFR BLD: 64.7 %
NONHDLC SERPL-MCNC: 151 MG/DL
NRBC BLD-RTO: 0 /100 WBC
PLATELET # BLD AUTO: 237 K/UL
PMV BLD AUTO: 10.9 FL
POTASSIUM SERPL-SCNC: 3.9 MMOL/L
PROT SERPL-MCNC: 7.6 G/DL
RBC # BLD AUTO: 4.96 M/UL
SODIUM SERPL-SCNC: 141 MMOL/L
TRIGL SERPL-MCNC: 172 MG/DL
TSH SERPL DL<=0.005 MIU/L-ACNC: 1.32 UIU/ML
WBC # BLD AUTO: 7.93 K/UL

## 2017-12-05 PROCEDURE — 80061 LIPID PANEL: CPT

## 2017-12-05 PROCEDURE — 80053 COMPREHEN METABOLIC PANEL: CPT

## 2017-12-05 PROCEDURE — 36415 COLL VENOUS BLD VENIPUNCTURE: CPT | Mod: PO

## 2017-12-05 PROCEDURE — 85025 COMPLETE CBC W/AUTO DIFF WBC: CPT

## 2017-12-05 PROCEDURE — 83036 HEMOGLOBIN GLYCOSYLATED A1C: CPT

## 2017-12-05 PROCEDURE — 84443 ASSAY THYROID STIM HORMONE: CPT

## 2017-12-06 LAB
ESTIMATED AVG GLUCOSE: 105 MG/DL
HBA1C MFR BLD HPLC: 5.3 %

## 2017-12-07 ENCOUNTER — TELEPHONE (OUTPATIENT)
Dept: INTERNAL MEDICINE | Facility: CLINIC | Age: 57
End: 2017-12-07

## 2017-12-07 NOTE — TELEPHONE ENCOUNTER
----- Message from Cristal Enriquez MD sent at 12/6/2017  9:00 PM CST -----  Please review your lab work and we will discuss at your pending office visit.  Cristal Tejeda

## 2017-12-12 ENCOUNTER — TELEPHONE (OUTPATIENT)
Dept: INTERNAL MEDICINE | Facility: CLINIC | Age: 57
End: 2017-12-12

## 2017-12-12 ENCOUNTER — OFFICE VISIT (OUTPATIENT)
Dept: INTERNAL MEDICINE | Facility: CLINIC | Age: 57
End: 2017-12-12
Payer: MEDICARE

## 2017-12-12 VITALS
BODY MASS INDEX: 27.36 KG/M2 | HEIGHT: 64 IN | DIASTOLIC BLOOD PRESSURE: 83 MMHG | WEIGHT: 160.25 LBS | SYSTOLIC BLOOD PRESSURE: 136 MMHG | RESPIRATION RATE: 16 BRPM | TEMPERATURE: 98 F | OXYGEN SATURATION: 99 % | HEART RATE: 84 BPM

## 2017-12-12 DIAGNOSIS — Z12.31 ENCOUNTER FOR SCREENING MAMMOGRAM FOR BREAST CANCER: ICD-10-CM

## 2017-12-12 DIAGNOSIS — Z00.00 ANNUAL PHYSICAL EXAM: Primary | ICD-10-CM

## 2017-12-12 DIAGNOSIS — E78.5 HYPERLIPIDEMIA, UNSPECIFIED HYPERLIPIDEMIA TYPE: ICD-10-CM

## 2017-12-12 DIAGNOSIS — I70.0 AORTIC ATHEROSCLEROSIS: ICD-10-CM

## 2017-12-12 DIAGNOSIS — R44.2 OLFACTORY HALLUCINATIONS: ICD-10-CM

## 2017-12-12 DIAGNOSIS — I10 ESSENTIAL HYPERTENSION: ICD-10-CM

## 2017-12-12 DIAGNOSIS — R73.01 IMPAIRED FASTING GLUCOSE: ICD-10-CM

## 2017-12-12 PROCEDURE — 99999 PR PBB SHADOW E&M-EST. PATIENT-LVL IV: CPT | Mod: PBBFAC,,, | Performed by: INTERNAL MEDICINE

## 2017-12-12 PROCEDURE — 99499 UNLISTED E&M SERVICE: CPT | Mod: S$PBB,,, | Performed by: INTERNAL MEDICINE

## 2017-12-12 PROCEDURE — 99396 PREV VISIT EST AGE 40-64: CPT | Mod: S$GLB,,, | Performed by: INTERNAL MEDICINE

## 2017-12-12 NOTE — PROGRESS NOTES
56 yo female presents for  Annual PE  Nonsmoker  Social ETOH    HEALTH MAINT:  Chol/lab, reviewed-  C-scope,9/2012  Impression: - normal,  7 years   WWE, 4/2012, pap- normal, asx, pt declined update today  Mammo, pending 1/2018  BMD, 2016  EYE exam, UTD  DDS exam,UTD    VACCINATIONS:  Tdap, 12/31/2013  Flu, yearly    MedCard reviewed.   REVIEW OF SYSTEMS:   CONSTITUTIONAL: No fever, no chills, no night sweats.   EYES: No double vision or itchy watery eyes.   ENT: No ear tinnitus. No sinus pain or pressure.  ++ olfactory hallucinations, smells odors that no one else does, in particular smoke- denied HA or URI or allergy sx;   No focal deficits or neurologic sx  No left ear/jaw discomfort ;  CARDIOVASCULAR: No angina or palpitations.   No claudication w/ exercise-walking  RESPIRATORY: No cough or wheezing.   GI: Occ. indigestion or heartburn. Recent episode of diarrhea x several days - ALT elevated w/ nl AST  No metallic tastes   No blood in her stool or urine.  GYN: The patient's last menstrual cycle was around 1/2011   NEUROLOGICAL: No unusual headaches. No focal deficits.  MS: s/p right shoulder surgery, occ problematic; neck pain, breast reduction surgery   SKIN: LE discoloration, chronic, no improvement off amlodipine, intermittent LLE numbness  Remainder of review negative except as previously noted.         PHYSICAL EXAM:   VS: stable  GENERAL: She is alert and oriented in no acute distress. WDWN, conversant and cooperative. Pleasant pt  EYES: Conjunctivae and lids are okay. Pupils are reactive.   ENT: Hearing intact, canals w/o significant cerumen on left  + cerumen on right, TM's visualized unremarkable  Nasal mucosa/turbinates injected w/o exudate  Oropharynx is unremarkable  Sinus NT; left TMJ tender w/ discomfort w/ motion.   NECK: Supple. No thyromegaly or lymphadenopathy noted  RESPIRATORY: Efforts are unlabored.   LUNGS: Clear to auscultation.   HEART: Regular rate and rhythm. No carotid bruits,   1+  pedal pulses, no edema.   ABDOMEN: Bowel sounds present, soft, and nontender. No hepatosplenomegaly.  MS: Gait nl, No CCE  NEURO: CARRILLO. No tremor noted  SKIN: Warm and dry; + LE bronzing      IMPRESSION:  Annual PE  FHX: DM  HTN, stable  HLD. Elevated LDL  Aortic atherosclerosis, asx  IFBS, stable  Hypokalemia, reported compliance w/ Rx potassium and takes Mg daily  LE bronzing  Olfactory hallucinations, new      PLAN:  Consider Neurology /ENT  Mammo  Diet  Exercise  Wt loss  Monitor BP w/ changes   Call prn  RTC 6 mos

## 2017-12-12 NOTE — TELEPHONE ENCOUNTER
----- Message from Lakeshia Gallagher sent at 12/11/2017 12:04 PM CST -----  Contact: Self [phone]  Patient is returning a phone call.  Who left a message for the patient:   Does patient know what this is regarding:    Comments:

## 2017-12-13 ENCOUNTER — PATIENT MESSAGE (OUTPATIENT)
Dept: INTERNAL MEDICINE | Facility: CLINIC | Age: 57
End: 2017-12-13

## 2017-12-13 ENCOUNTER — TELEPHONE (OUTPATIENT)
Dept: PLASTIC SURGERY | Facility: CLINIC | Age: 57
End: 2017-12-13

## 2017-12-13 NOTE — TELEPHONE ENCOUNTER
----- Message from Lyndon Polanco sent at 12/13/2017  9:56 AM CST -----  Pt has questions regarding the mammogram that she needs to have scheduled before procedure. Please call pt regarding this at [phone]

## 2017-12-13 NOTE — TELEPHONE ENCOUNTER
Patient states that she's spoken to someone in our office that told her in order to have her breast reduction surgery approved by the ins. Co., she needed a more current mammogram by 7 January 2018.  Last mammogram -> 1/9/2017  Scheduled mammogram -> 1/9/2018  Okay to schedule surgery per Ese    ----- Message from Randi Sahu sent at 12/13/2017 10:49 AM CST -----  Contact: Pt.Self   Pt.States she would like to speak with Ms.Denise Poe please call Pt. Back @ [phone] Thank You :)

## 2017-12-20 ENCOUNTER — OFFICE VISIT (OUTPATIENT)
Dept: PLASTIC SURGERY | Facility: CLINIC | Age: 57
End: 2017-12-20
Payer: MEDICARE

## 2017-12-20 VITALS
SYSTOLIC BLOOD PRESSURE: 146 MMHG | HEART RATE: 78 BPM | WEIGHT: 161.19 LBS | DIASTOLIC BLOOD PRESSURE: 85 MMHG | TEMPERATURE: 98 F | BODY MASS INDEX: 27.52 KG/M2 | HEIGHT: 64 IN

## 2017-12-20 DIAGNOSIS — N62 MACROMASTIA: Primary | ICD-10-CM

## 2017-12-20 PROCEDURE — 99213 OFFICE O/P EST LOW 20 MIN: CPT | Mod: S$GLB,,, | Performed by: SURGERY

## 2017-12-20 PROCEDURE — 99999 PR PBB SHADOW E&M-EST. PATIENT-LVL III: CPT | Mod: PBBFAC,,, | Performed by: SURGERY

## 2017-12-20 NOTE — PROGRESS NOTES
Ms. Ivelisse Hay [facility] with her .    The patient wanted to have further discussion regarding her breast reduction.    She specifically wanted to know who is doing her surgery.  I informed the   patient that I do the left breast and I monitor the resident as he does the   right breast.  I discussed with the patient that there will be multiple people   suturing on her breast, but I monitor the situation.  We discussed the risks   again and possible complications including, but not limited to infection,   bleeding, scarring, breast asymmetry, breast deformity, loss of sensation to the   nipples, partial or total loss of the nipple and need for further surgery.  The   patient and I also discussed sizes.  I think that she would do the best with a   small C cup.  I told her I would approach in this fashion, but could not   guarantee that she would in fact be a C cup.  She is also aware her note to the   insurance company will be submitted in the first week of January and she will be   then placed on the schedule.              /laurence [id number] carolyn(s)        SAIRA/ALEX  dd: 12/20/2017 14:14:35 (CST)  td: 12/21/2017 06:40:05 (CST)  Doc ID   #[id number]  Job ID #[id number]    CC:

## 2018-01-09 ENCOUNTER — HOSPITAL ENCOUNTER (OUTPATIENT)
Dept: RADIOLOGY | Facility: HOSPITAL | Age: 58
Discharge: HOME | End: 2018-01-09
Attending: INTERNAL MEDICINE
Payer: MEDICARE

## 2018-01-09 DIAGNOSIS — Z12.31 ENCOUNTER FOR SCREENING MAMMOGRAM FOR BREAST CANCER: ICD-10-CM

## 2018-01-09 PROCEDURE — 77067 SCR MAMMO BI INCL CAD: CPT | Mod: TC,PO

## 2018-01-09 PROCEDURE — 77063 BREAST TOMOSYNTHESIS BI: CPT | Mod: 26,,, | Performed by: RADIOLOGY

## 2018-01-09 PROCEDURE — 77067 SCR MAMMO BI INCL CAD: CPT | Mod: 26,,, | Performed by: RADIOLOGY

## 2018-01-19 ENCOUNTER — PATIENT MESSAGE (OUTPATIENT)
Dept: PLASTIC SURGERY | Facility: CLINIC | Age: 58
End: 2018-01-19

## 2018-01-25 DIAGNOSIS — G89.29 CHRONIC BILATERAL LOW BACK PAIN, WITH SCIATICA PRESENCE UNSPECIFIED: ICD-10-CM

## 2018-01-25 DIAGNOSIS — R21 EXCORIATED RASH: ICD-10-CM

## 2018-01-25 DIAGNOSIS — M54.5 CHRONIC BILATERAL LOW BACK PAIN, WITH SCIATICA PRESENCE UNSPECIFIED: ICD-10-CM

## 2018-01-25 DIAGNOSIS — G89.29 CHRONIC NECK PAIN: Primary | ICD-10-CM

## 2018-01-25 DIAGNOSIS — M54.2 CHRONIC NECK PAIN: Primary | ICD-10-CM

## 2018-01-25 DIAGNOSIS — N62 MACROMASTIA: ICD-10-CM

## 2018-01-31 RX ORDER — ATORVASTATIN CALCIUM 20 MG/1
TABLET, FILM COATED ORAL
Qty: 90 TABLET | Refills: 2 | Status: SHIPPED | OUTPATIENT
Start: 2018-01-31 | End: 2018-12-19 | Stop reason: SDUPTHER

## 2018-03-01 ENCOUNTER — TELEPHONE (OUTPATIENT)
Dept: PLASTIC SURGERY | Facility: CLINIC | Age: 58
End: 2018-03-01

## 2018-03-01 NOTE — TELEPHONE ENCOUNTER
Pre op phone call made spoke with patient. Pre op instructions, dates and times reviewed with patient. Patient verbalized understanding. Pre op paperwork mailed to patient.

## 2018-03-06 ENCOUNTER — ANESTHESIA EVENT (OUTPATIENT)
Dept: SURGERY | Facility: HOSPITAL | Age: 58
End: 2018-03-06
Payer: MEDICARE

## 2018-03-06 DIAGNOSIS — Z01.818 PREOPERATIVE TESTING: Primary | ICD-10-CM

## 2018-03-06 NOTE — PRE ADMISSION SCREENING
Anesthesia Assessment: Preoperative EQUATION    Planned Procedure: Procedure(s) (LRB):  REDUCTION-MAMMOPLASTY-BILATERAL (CONSENT AM OF) (Bilateral)  Requested Anesthesia Type:General  Surgeon: Raad Rodriguez MD  Service: Plastics  Known or anticipated Date of Surgery:3/15/2018    Surgeon notes: reviewed    Electronic QUestionnaire Assessment completed via nurse interview with patient.        No AQ    Triage considerations:     The patient has no apparent active cardiac condition (No unstable coronary Syndrome such as severe unstable angina or recent [<1 month] myocardial infarction, decompensated CHF, severe valvular   disease or significant arrhythmia)    Previous anesthesia records:GETA, MAC and No problems 5/2017 EGD Airway/Jaw/Neck:  Airway Findings: Mouth Opening: Normal Tongue: Normal  General Airway Assessment: Adult  Mallampati: II  TM Distance: Normal, at least 6 cm      Dental:  Dental Findings: In tact     12/10/14; Placement Time: 1143; Method of Intubation: Direct laryngoscopy; Inserted by: CRNA; Airway Device: Endotracheal Tube; Mask Ventilation: Easy - oral; Intubated: Postinduction; Blade: Poon #2; Airway Device Size: 7.0; Style: Cuffed; Cuff Inflation: Minimal occlusive pressure; Inflation Amount: 6; Placement Verified By: Auscultation, Capnometry; Grade: Grade I; Complicating Factors: None; Intubation Findings: Positive EtCO2, Bilateral breath sounds, Atraumatic/Condition of teeth unchanged;  Depth of Insertion: 21; Securment: Lips; Complications: None; Breath Sounds: Equal Bilateral; Insertion Attempts: 1;     Last PCP note: within 3 months , within [facility]   Subspecialty notes: n/a    Other important co-morbidities: HTN/HLP     Tests already available:  Available tests,  3-6 months ago , within [facility] . 12/5/17 A1c, CBC, CMP. 2014 echo and EKG.            Instructions given. (See in Nurse's note)    Optimization:  Anesthesia Preop Clinic Assessment  Indicated-not required for this  procedure       Plan:    Testing:  Hemoglobin and BMP        Patient  has previously scheduled Medical Appointment: none    Navigation: Tests Scheduled. Am of surgery                            Straight Line to surgery.

## 2018-03-06 NOTE — ANESTHESIA PREPROCEDURE EVALUATION
Pre Admission Screening  Sabrina Andrade RN      []Hide copied text  Anesthesia Assessment: Preoperative EQUATION     Planned Procedure: Procedure(s) (LRB):  REDUCTION-MAMMOPLASTY-BILATERAL (CONSENT AM OF) (Bilateral)  Requested Anesthesia Type:General  Surgeon: Raad Rodriguez MD  Service: Plastics  Known or anticipated Date of Surgery:3/15/2018     Surgeon notes: reviewed     Electronic QUestionnaire Assessment completed via nurse interview with patient.         No AQ     Triage considerations:      The patient has no apparent active cardiac condition (No unstable coronary Syndrome such as severe unstable angina or recent [<1 month] myocardial infarction, decompensated CHF, severe valvular   disease or significant arrhythmia)     Previous anesthesia records:GETA, MAC and No problems 5/2017 EGD Airway/Jaw/Neck:  Airway Findings: Mouth Opening: Normal Tongue: Normal  General Airway Assessment: Adult  Mallampati: II  TM Distance: Normal, at least 6 cm      Dental:  Dental Findings: In tact      12/10/14; Placement Time: 1143; Method of Intubation: Direct laryngoscopy; Inserted by: CRNA; Airway Device: Endotracheal Tube; Mask Ventilation: Easy - oral; Intubated: Postinduction; Blade: Poon #2; Airway Device Size: 7.0; Style: Cuffed; Cuff Inflation: Minimal occlusive pressure; Inflation Amount: 6; Placement Verified By: Auscultation, Capnometry; Grade: Grade I; Complicating Factors: None; Intubation Findings: Positive EtCO2, Bilateral breath sounds, Atraumatic/Condition of teeth unchanged;  Depth of Insertion: 21; Securment: Lips; Complications: None; Breath Sounds: Equal Bilateral; Insertion Attempts: 1;      Last PCP note: within 3 months , within [facility]   Subspecialty notes: n/a     Other important co-morbidities: HTN/HLP     Tests already available:  Available tests,  3-6 months ago , within [facility] . 12/5/17 A1c, CBC, CMP. 2014 echo and EKG.                            Instructions given. (See in Nurse's  note)     Optimization:  Anesthesia Preop Clinic Assessment  Indicated-not required for this procedure                Plan:    Testing:  Hemoglobin and BMP                              Patient  has previously scheduled Medical Appointment: none     Navigation: Tests Scheduled. Am of surgery                                                 Straight Line to surgery.                                Electronically signed by Sabrina Andrade RN at 3/6/2018 10:35 AM        Pre-admit on 3/15/2018            Detailed Report                                                                                                                        03/06/2018  Ivelisse Hay is a 57 y.o., female with hx of HTN, gastritis, GERD, HLD who presents for:    Pre-operative evaluation for Procedure(s) (LRB):  REDUCTION-MAMMOPLASTY-BILATERAL (CONSENT AM OF) (Bilateral)    Previous Anesthesia:  Placement Date: 04/21/14; Placement Time: 1313; Method of Intubation: Direct laryngoscopy; Inserted by: CRNA; Airway Device: Endotracheal Tube; Mask Ventilation: Easy; Intubated: Postinduction; Blade: Poon #2; Airway Device Size: 7.0; Style: Cuffed; Cuff Inflation: Minimal occlusive pressure; Placement Verified By: Auscultation, Capnometry; Grade: Grade I; Complicating Factors: None; Intubation Findings: Positive EtCO2, Bilateral breath sounds, Atraumatic/Condition of teeth unchanged;  Depth of Insertion: 22; Securment: Lips; Complications: None; Breath Sounds: Equal Bilateral; Insertion Attempts: 1; Removal Date: 04/21/14;  Removal Time: 1428    Diagnostic Studies:  11/25/14 CXR  There has been interval removal of a right central venous catheter.    Lung volumes are normal and symmetric.  The mediastinal structures are midline.  There is mild atherosclerotic calcification of the aortic arch.    No pulmonary consolidation.  No pneumothorax or pleural effusion.    No free air beneath the diaphragm.  The bones demonstrate subtle compression deformity  of the L1 vertebral body.    12/3/14 Exercise stress echo  EKG Conclusions:    1. The EKG portion of this study is negative for ischemia at a moderate workload, and peak heart rate of 153 bpm (97% of predicted).   2. Exercise capacity is average.   3. Blood pressure response to exercise was normal (Presenting BP: 135/70 Peak BP: 189/76).   4. No significant arrhythmias were present.   5. There were no symptoms of chest discomfort or significant dyspnea throughout the protocol.   6. The Duke treadmill score was 7 suggesting a low probability for future cardiovascular events.    CONCLUSIONS   No evidence of stress induced myocardial ischemia.       11/25/14 EKG:  Normal sinus rhythm  Nonspecific T wave abnormality    2D Echo:  Results for orders placed or performed during the hospital encounter of 04/03/14   2D echo with color flow doppler   Result Value Ref Range    EF 66     Diastolic Dysfunction No        Patient Active Problem List   Diagnosis    Rotator cuff tear    Abdominal pain, other specified site    HTN (hypertension)    HLD (hyperlipidemia)    Impaired fasting glucose    Shoulder pain    Hypokalemia    Hypophosphatemia    Calculus of kidney and ureter(592)    Pain in joint, foot, left    Acromioclavicular joint arthritis    S/P L shoulder surgery, RCR, SAD, DCE, labrum debridement, open subpec biceps tenodesis (12/11/14)    Nuclear sclerosis of both eyes    Dry eye syndrome    Hyperopia of both eyes with astigmatism and presbyopia    Aortic atherosclerosis    Olfactory hallucinations    Epigastric pain    Chronic low back pain with sciatica    Chronic neck pain       Review of patient's allergies indicates:  No Known Allergies     No current facility-administered medications on file prior to encounter.      Current Outpatient Prescriptions on File Prior to Encounter   Medication Sig Dispense Refill    aspirin (ECOTRIN) 81 MG EC tablet Take 81 mg by mouth once daily.      fish  oil-omega-3 fatty acids 300-1,000 mg capsule Take 2 g by mouth once daily.      Lactobacillus rhamnosus GG (CULTURELLE) 10 billion cell capsule Take 1 capsule by mouth once daily.      losartan (COZAAR) 50 MG tablet TAKE 1 TABLET (50 MG TOTAL) BY MOUTH ONCE DAILY. 90 tablet 2    magnesium 30 mg Tab Take by mouth.      multivitamin capsule Take 1 capsule by mouth once daily.      potassium chloride (KLOR-CON) 8 MEQ TbSR Take 1 tablet (8 mEq total) by mouth 2 (two) times daily. 180 tablet 1       Past Surgical History:   Procedure Laterality Date    CYSTOSCOPY      NEPHROSTOMY TUBE      CYSTOSCOPY WITH STENT    SHOULDER SURGERY  9-17-13    right    URETERAL STENT PLACEMENT      THEN REMOVED       Social History     Social History    Marital status:      Spouse name: N/A    Number of children: N/A    Years of education: N/A     Occupational History    Not on file.     Social History Main Topics    Smoking status: Never Smoker    Smokeless tobacco: Never Used    Alcohol use No    Drug use: No    Sexual activity: Yes     Partners: Male     Other Topics Concern    Not on file     Social History Narrative        2 children, 1 dtr and 1 son    dtr in Med school    ,     Son to be  12/2015    +/- exercise         Vital Signs Range (Last 24H):         CBC: No results for input(s): WBC, RBC, HGB, HCT, PLT, MCV, MCH, MCHC in the last 72 hours.    CMP: No results for input(s): NA, K, CL, CO2, BUN, CREATININE, GLU, MG, PHOS, CALCIUM, ALBUMIN, PROT, ALKPHOS, ALT, AST, BILITOT in the last 72 hours.    INR  No results for input(s): PT, INR, PROTIME, APTT in the last 72 hours.          Anesthesia Evaluation    I have reviewed the Patient Summary Reports.    I have reviewed the Nursing Notes.   I have reviewed the Medications.     Review of Systems  Anesthesia Hx:  No problems with previous Anesthesia  History of prior surgery of interest to airway management or planning: Previous  anesthesia: MAC  5/2017 EGD with MAC.  Procedure performed at an [facility] Facility. Denies Family Hx of Anesthesia complications.   Denies Personal Hx of Anesthesia complications.   Social:  Non-Smoker    Hematology/Oncology:  Hematology Normal   Oncology Normal     EENT/Dental:EENT/Dental Normal   Cardiovascular:   Hypertension  Denies Angina. hyperlipidemia  Functional Capacity 4 METS  Hypertension , Well Controlled on Rx    Pulmonary:  Pulmonary Normal  Denies Shortness of breath.  Denies Recent URI.    Renal/:  Renal/ Normal     Musculoskeletal:  Musculoskeletal Normal    Neurological:  Neurology Normal    Endocrine:  Endocrine Normal    Psych:  Psychiatric Normal           Physical Exam  General:  Well nourished    Airway/Jaw/Neck:  Airway Findings: Mouth Opening: Normal Tongue: Normal  General Airway Assessment: Good  Mallampati: III  Improves to II with phonation.  TM Distance: Normal, at least 6 cm  Jaw/Neck Findings:  Neck ROM: Normal ROM      Dental:  Dental Findings: In tact   Chest/Lungs:  Chest/Lungs Findings: Normal Respiratory Rate     Heart/Vascular:  Heart Findings: Rate: Normal        Mental Status:  Mental Status Findings:  Alert and Oriented         Anesthesia Plan  Type of Anesthesia, risks & benefits discussed:  Anesthesia Type:  general  Patient's Preference:   Intra-op Monitoring Plan: standard ASA monitors  Intra-op Monitoring Plan Comments:   Post Op Pain Control Plan: multimodal analgesia, peripheral nerve block and per primary service following discharge from PACU  Post Op Pain Control Plan Comments:   Induction:   IV  Beta Blocker:  Patient is not currently on a Beta-Blocker (No further documentation required).       Informed Consent: Patient understands risks and agrees with Anesthesia plan.  Questions answered. Anesthesia consent signed with patient.  ASA Score: 2     Day of Surgery Review of History & Physical:    H&P update referred to the surgeon.         Ready For Surgery From  Anesthesia Perspective.

## 2018-03-12 ENCOUNTER — TELEPHONE (OUTPATIENT)
Dept: PLASTIC SURGERY | Facility: CLINIC | Age: 58
End: 2018-03-12

## 2018-03-12 NOTE — TELEPHONE ENCOUNTER
Tried calling Mrs. Hay at home and mobile numbers, but neither line has voice messaging  set up. There was a cancellation on 13 March for BBR and I wanted to move her to that date and time. If it is okay with Mrs. Hay, then I'll schedule it.

## 2018-03-13 RX ORDER — TRIAMTERENE AND HYDROCHLOROTHIAZIDE 37.5; 25 MG/1; MG/1
CAPSULE ORAL
Qty: 90 CAPSULE | Refills: 1 | Status: SHIPPED | OUTPATIENT
Start: 2018-03-13 | End: 2018-09-22 | Stop reason: SDUPTHER

## 2018-03-14 ENCOUNTER — TELEPHONE (OUTPATIENT)
Dept: PLASTIC SURGERY | Facility: CLINIC | Age: 58
End: 2018-03-14

## 2018-03-14 NOTE — TELEPHONE ENCOUNTER
Spoke with pt regarding surgery, pt advised to arrive to [facility] at 0700 for 0900 surgery, pt verbalized understanding, pre-op education reinforced, all questions answered at this time, pt given reassurance

## 2018-03-15 ENCOUNTER — SURGERY (OUTPATIENT)
Age: 58
End: 2018-03-15

## 2018-03-15 ENCOUNTER — ANESTHESIA (OUTPATIENT)
Dept: SURGERY | Facility: HOSPITAL | Age: 58
End: 2018-03-15
Payer: MEDICARE

## 2018-03-15 ENCOUNTER — HOSPITAL ENCOUNTER (OUTPATIENT)
Facility: HOSPITAL | Age: 58
Discharge: HOME | End: 2018-03-15
Attending: SURGERY | Admitting: SURGERY
Payer: MEDICARE

## 2018-03-15 VITALS
BODY MASS INDEX: 26.98 KG/M2 | HEIGHT: 64 IN | SYSTOLIC BLOOD PRESSURE: 104 MMHG | RESPIRATION RATE: 17 BRPM | TEMPERATURE: 98 F | OXYGEN SATURATION: 100 % | WEIGHT: 158 LBS | DIASTOLIC BLOOD PRESSURE: 55 MMHG | HEART RATE: 62 BPM

## 2018-03-15 DIAGNOSIS — N62 MACROMASTIA: Primary | ICD-10-CM

## 2018-03-15 DIAGNOSIS — Z01.818 PREOPERATIVE TESTING: ICD-10-CM

## 2018-03-15 LAB
ANION GAP SERPL CALC-SCNC: 9 MMOL/L
BUN SERPL-MCNC: 17 MG/DL
CALCIUM SERPL-MCNC: 9.9 MG/DL
CHLORIDE SERPL-SCNC: 104 MMOL/L
CO2 SERPL-SCNC: 28 MMOL/L
CREAT SERPL-MCNC: 0.8 MG/DL
EST. GFR  (AFRICAN AMERICAN): >60 ML/MIN/1.73 M^2
EST. GFR  (NON AFRICAN AMERICAN): >60 ML/MIN/1.73 M^2
GLUCOSE SERPL-MCNC: 117 MG/DL
HGB BLD-MCNC: 14.2 G/DL
POTASSIUM SERPL-SCNC: 3.5 MMOL/L
SODIUM SERPL-SCNC: 141 MMOL/L

## 2018-03-15 PROCEDURE — 63600175 PHARM REV CODE 636 W HCPCS: Performed by: PHYSICIAN ASSISTANT

## 2018-03-15 PROCEDURE — 37000009 HC ANESTHESIA EA ADD 15 MINS: Performed by: SURGERY

## 2018-03-15 PROCEDURE — 63600175 PHARM REV CODE 636 W HCPCS: Performed by: ANESTHESIOLOGY

## 2018-03-15 PROCEDURE — 76942 ECHO GUIDE FOR BIOPSY: CPT | Performed by: ANESTHESIOLOGY

## 2018-03-15 PROCEDURE — 25000003 PHARM REV CODE 250: Performed by: SURGERY

## 2018-03-15 PROCEDURE — 88305 TISSUE EXAM BY PATHOLOGIST: CPT | Mod: 59 | Performed by: PATHOLOGY

## 2018-03-15 PROCEDURE — 25000003 PHARM REV CODE 250: Performed by: ANESTHESIOLOGY

## 2018-03-15 PROCEDURE — 71000033 HC RECOVERY, INTIAL HOUR: Performed by: SURGERY

## 2018-03-15 PROCEDURE — C1729 CATH, DRAINAGE: HCPCS | Performed by: SURGERY

## 2018-03-15 PROCEDURE — 80048 BASIC METABOLIC PNL TOTAL CA: CPT

## 2018-03-15 PROCEDURE — 36000707: Performed by: SURGERY

## 2018-03-15 PROCEDURE — 64450 NJX AA&/STRD OTHER PN/BRANCH: CPT | Mod: 50,59,, | Performed by: ANESTHESIOLOGY

## 2018-03-15 PROCEDURE — 94761 N-INVAS EAR/PLS OXIMETRY MLT: CPT

## 2018-03-15 PROCEDURE — 71000015 HC POSTOP RECOV 1ST HR: Performed by: SURGERY

## 2018-03-15 PROCEDURE — S0077 INJECTION, CLINDAMYCIN PHOSP: HCPCS | Performed by: SURGERY

## 2018-03-15 PROCEDURE — 71000016 HC POSTOP RECOV ADDL HR: Performed by: SURGERY

## 2018-03-15 PROCEDURE — 71000039 HC RECOVERY, EACH ADD'L HOUR: Performed by: SURGERY

## 2018-03-15 PROCEDURE — 27000221 HC OXYGEN, UP TO 24 HOURS

## 2018-03-15 PROCEDURE — 25000003 PHARM REV CODE 250: Performed by: PHYSICIAN ASSISTANT

## 2018-03-15 PROCEDURE — 88305 TISSUE EXAM BY PATHOLOGIST: CPT | Mod: 26,,, | Performed by: PATHOLOGY

## 2018-03-15 PROCEDURE — 85018 HEMOGLOBIN: CPT

## 2018-03-15 PROCEDURE — D9220A PRA ANESTHESIA: Mod: ,,, | Performed by: ANESTHESIOLOGY

## 2018-03-15 PROCEDURE — 36000706: Performed by: SURGERY

## 2018-03-15 PROCEDURE — 37000008 HC ANESTHESIA 1ST 15 MINUTES: Performed by: SURGERY

## 2018-03-15 PROCEDURE — 19318 BREAST REDUCTION: CPT | Mod: 50,,, | Performed by: SURGERY

## 2018-03-15 PROCEDURE — 27201423 OPTIME MED/SURG SUP & DEVICES STERILE SUPPLY: Performed by: SURGERY

## 2018-03-15 RX ORDER — SODIUM CHLORIDE 0.9 % (FLUSH) 0.9 %
3 SYRINGE (ML) INJECTION
Status: DISCONTINUED | OUTPATIENT
Start: 2018-03-15 | End: 2018-03-15 | Stop reason: HOSPADM

## 2018-03-15 RX ORDER — FENTANYL CITRATE 50 UG/ML
25 INJECTION, SOLUTION INTRAMUSCULAR; INTRAVENOUS EVERY 5 MIN PRN
Status: DISCONTINUED | OUTPATIENT
Start: 2018-03-15 | End: 2018-03-15 | Stop reason: HOSPADM

## 2018-03-15 RX ORDER — OXYCODONE HYDROCHLORIDE 5 MG/1
TABLET ORAL
Status: COMPLETED
Start: 2018-03-15 | End: 2018-03-15

## 2018-03-15 RX ORDER — HEPARIN SODIUM 5000 [USP'U]/ML
5000 INJECTION, SOLUTION INTRAVENOUS; SUBCUTANEOUS ONCE
Status: DISCONTINUED | OUTPATIENT
Start: 2018-03-15 | End: 2018-03-15 | Stop reason: HOSPADM

## 2018-03-15 RX ORDER — DEXAMETHASONE SODIUM PHOSPHATE 4 MG/ML
INJECTION, SOLUTION INTRA-ARTICULAR; INTRALESIONAL; INTRAMUSCULAR; INTRAVENOUS; SOFT TISSUE
Status: DISCONTINUED | OUTPATIENT
Start: 2018-03-15 | End: 2018-03-15

## 2018-03-15 RX ORDER — FENTANYL CITRATE 50 UG/ML
INJECTION, SOLUTION INTRAMUSCULAR; INTRAVENOUS
Status: DISCONTINUED | OUTPATIENT
Start: 2018-03-15 | End: 2018-03-15

## 2018-03-15 RX ORDER — LIDOCAINE HCL/PF 100 MG/5ML
SYRINGE (ML) INTRAVENOUS
Status: DISCONTINUED | OUTPATIENT
Start: 2018-03-15 | End: 2018-03-15

## 2018-03-15 RX ORDER — LIDOCAINE HYDROCHLORIDE 10 MG/ML
1 INJECTION, SOLUTION EPIDURAL; INFILTRATION; INTRACAUDAL; PERINEURAL ONCE
Status: DISCONTINUED | OUTPATIENT
Start: 2018-03-15 | End: 2018-03-15 | Stop reason: HOSPADM

## 2018-03-15 RX ORDER — ROCURONIUM BROMIDE 10 MG/ML
INJECTION, SOLUTION INTRAVENOUS
Status: DISCONTINUED | OUTPATIENT
Start: 2018-03-15 | End: 2018-03-15

## 2018-03-15 RX ORDER — LIDOCAINE HYDROCHLORIDE AND EPINEPHRINE 10; 10 MG/ML; UG/ML
INJECTION, SOLUTION INFILTRATION; PERINEURAL
Status: DISCONTINUED | OUTPATIENT
Start: 2018-03-15 | End: 2018-03-15 | Stop reason: HOSPADM

## 2018-03-15 RX ORDER — CLINDAMYCIN PHOSPHATE 900 MG/50ML
900 INJECTION, SOLUTION INTRAVENOUS
Status: COMPLETED | OUTPATIENT
Start: 2018-03-15 | End: 2018-03-15

## 2018-03-15 RX ORDER — PROPOFOL 10 MG/ML
INJECTION, EMULSION INTRAVENOUS
Status: DISCONTINUED | OUTPATIENT
Start: 2018-03-15 | End: 2018-03-15

## 2018-03-15 RX ORDER — LIDOCAINE HYDROCHLORIDE 10 MG/ML
5 INJECTION, SOLUTION EPIDURAL; INFILTRATION; INTRACAUDAL; PERINEURAL ONCE
Status: COMPLETED | OUTPATIENT
Start: 2018-03-15 | End: 2018-03-15

## 2018-03-15 RX ORDER — ONDANSETRON 8 MG/1
8 TABLET, ORALLY DISINTEGRATING ORAL EVERY 8 HOURS PRN
Status: DISCONTINUED | OUTPATIENT
Start: 2018-03-15 | End: 2018-03-15 | Stop reason: HOSPADM

## 2018-03-15 RX ORDER — GLYCOPYRROLATE 0.2 MG/ML
INJECTION INTRAMUSCULAR; INTRAVENOUS
Status: DISCONTINUED | OUTPATIENT
Start: 2018-03-15 | End: 2018-03-15

## 2018-03-15 RX ORDER — HYDROCODONE BITARTRATE AND ACETAMINOPHEN 5; 325 MG/1; MG/1
1 TABLET ORAL EVERY 4 HOURS PRN
Status: DISCONTINUED | OUTPATIENT
Start: 2018-03-15 | End: 2018-03-15 | Stop reason: HOSPADM

## 2018-03-15 RX ORDER — CEPHALEXIN 500 MG/1
500 CAPSULE ORAL EVERY 6 HOURS
Qty: 28 CAPSULE | Refills: 0 | Status: SHIPPED | OUTPATIENT
Start: 2018-03-15 | End: 2018-03-22

## 2018-03-15 RX ORDER — MIDAZOLAM HYDROCHLORIDE 1 MG/ML
0.5 INJECTION INTRAMUSCULAR; INTRAVENOUS EVERY 5 MIN PRN
Status: DISCONTINUED | OUTPATIENT
Start: 2018-03-15 | End: 2018-03-15 | Stop reason: HOSPADM

## 2018-03-15 RX ORDER — METOCLOPRAMIDE HYDROCHLORIDE 5 MG/ML
5 INJECTION INTRAMUSCULAR; INTRAVENOUS EVERY 6 HOURS PRN
Status: DISCONTINUED | OUTPATIENT
Start: 2018-03-15 | End: 2018-03-15 | Stop reason: HOSPADM

## 2018-03-15 RX ORDER — SODIUM CHLORIDE 9 MG/ML
INJECTION, SOLUTION INTRAVENOUS CONTINUOUS
Status: DISCONTINUED | OUTPATIENT
Start: 2018-03-15 | End: 2018-03-15 | Stop reason: HOSPADM

## 2018-03-15 RX ORDER — MIDAZOLAM HYDROCHLORIDE 1 MG/ML
INJECTION INTRAMUSCULAR; INTRAVENOUS
Status: DISCONTINUED | OUTPATIENT
Start: 2018-03-15 | End: 2018-03-15

## 2018-03-15 RX ORDER — OXYCODONE HYDROCHLORIDE 5 MG/1
10 TABLET ORAL EVERY 4 HOURS PRN
Status: DISCONTINUED | OUTPATIENT
Start: 2018-03-15 | End: 2018-03-15 | Stop reason: HOSPADM

## 2018-03-15 RX ORDER — SUCCINYLCHOLINE CHLORIDE 20 MG/ML
INJECTION INTRAMUSCULAR; INTRAVENOUS
Status: DISCONTINUED | OUTPATIENT
Start: 2018-03-15 | End: 2018-03-15

## 2018-03-15 RX ORDER — PHENYLEPHRINE HYDROCHLORIDE 10 MG/ML
INJECTION INTRAVENOUS
Status: DISCONTINUED | OUTPATIENT
Start: 2018-03-15 | End: 2018-03-15

## 2018-03-15 RX ORDER — NEOSTIGMINE METHYLSULFATE 1 MG/ML
INJECTION, SOLUTION INTRAVENOUS
Status: DISCONTINUED | OUTPATIENT
Start: 2018-03-15 | End: 2018-03-15

## 2018-03-15 RX ORDER — ONDANSETRON 2 MG/ML
INJECTION INTRAMUSCULAR; INTRAVENOUS
Status: DISCONTINUED | OUTPATIENT
Start: 2018-03-15 | End: 2018-03-15

## 2018-03-15 RX ORDER — OXYCODONE AND ACETAMINOPHEN 5; 325 MG/1; MG/1
1 TABLET ORAL EVERY 4 HOURS PRN
Qty: 28 TABLET | Refills: 0 | Status: SHIPPED | OUTPATIENT
Start: 2018-03-15 | End: 2018-07-24 | Stop reason: SDUPTHER

## 2018-03-15 RX ADMIN — FENTANYL CITRATE 25 MCG: 50 INJECTION, SOLUTION INTRAMUSCULAR; INTRAVENOUS at 01:03

## 2018-03-15 RX ADMIN — GLYCOPYRROLATE 0.6 MG: 0.2 INJECTION, SOLUTION INTRAMUSCULAR; INTRAVENOUS at 01:03

## 2018-03-15 RX ADMIN — FENTANYL CITRATE 75 MCG: 50 INJECTION, SOLUTION INTRAMUSCULAR; INTRAVENOUS at 10:03

## 2018-03-15 RX ADMIN — SODIUM CHLORIDE, SODIUM GLUCONATE, SODIUM ACETATE, POTASSIUM CHLORIDE, MAGNESIUM CHLORIDE, SODIUM PHOSPHATE, DIBASIC, AND POTASSIUM PHOSPHATE: .53; .5; .37; .037; .03; .012; .00082 INJECTION, SOLUTION INTRAVENOUS at 12:03

## 2018-03-15 RX ADMIN — ROCURONIUM BROMIDE 45 MG: 10 INJECTION, SOLUTION INTRAVENOUS at 10:03

## 2018-03-15 RX ADMIN — METOCLOPRAMIDE 5 MG: 5 INJECTION, SOLUTION INTRAMUSCULAR; INTRAVENOUS at 02:03

## 2018-03-15 RX ADMIN — OXYCODONE HYDROCHLORIDE 10 MG: 5 TABLET ORAL at 01:03

## 2018-03-15 RX ADMIN — FENTANYL CITRATE 25 MCG: 50 INJECTION, SOLUTION INTRAMUSCULAR; INTRAVENOUS at 12:03

## 2018-03-15 RX ADMIN — FENTANYL CITRATE 25 MCG: 50 INJECTION, SOLUTION INTRAMUSCULAR; INTRAVENOUS at 02:03

## 2018-03-15 RX ADMIN — CLINDAMYCIN PHOSPHATE 900 MG: 18 INJECTION, SOLUTION INTRAVENOUS at 10:03

## 2018-03-15 RX ADMIN — FENTANYL CITRATE 25 MCG: 50 INJECTION, SOLUTION INTRAMUSCULAR; INTRAVENOUS at 08:03

## 2018-03-15 RX ADMIN — PROPOFOL 120 MG: 10 INJECTION, EMULSION INTRAVENOUS at 10:03

## 2018-03-15 RX ADMIN — PHENYLEPHRINE HYDROCHLORIDE 100 MCG: 10 INJECTION INTRAVENOUS at 10:03

## 2018-03-15 RX ADMIN — SODIUM CHLORIDE: 0.9 INJECTION, SOLUTION INTRAVENOUS at 07:03

## 2018-03-15 RX ADMIN — LIDOCAINE HYDROCHLORIDE AND EPINEPHRINE 30 ML: 10; 10 INJECTION, SOLUTION INFILTRATION; PERINEURAL at 10:03

## 2018-03-15 RX ADMIN — SODIUM CHLORIDE 500 ML: 0.9 INJECTION, SOLUTION INTRAVENOUS at 03:03

## 2018-03-15 RX ADMIN — MIDAZOLAM HYDROCHLORIDE 2 MG: 1 INJECTION, SOLUTION INTRAMUSCULAR; INTRAVENOUS at 09:03

## 2018-03-15 RX ADMIN — SODIUM CHLORIDE 1000 ML: 0.9 INJECTION, SOLUTION INTRAVENOUS at 02:03

## 2018-03-15 RX ADMIN — ONDANSETRON 4 MG: 2 INJECTION INTRAMUSCULAR; INTRAVENOUS at 12:03

## 2018-03-15 RX ADMIN — OXYCODONE HYDROCHLORIDE 10 MG: 5 TABLET ORAL at 06:03

## 2018-03-15 RX ADMIN — MIDAZOLAM HYDROCHLORIDE 2 MG: 1 INJECTION, SOLUTION INTRAMUSCULAR; INTRAVENOUS at 08:03

## 2018-03-15 RX ADMIN — LIDOCAINE HYDROCHLORIDE 0.2 MG: 10 INJECTION, SOLUTION EPIDURAL; INFILTRATION; INTRACAUDAL; PERINEURAL at 07:03

## 2018-03-15 RX ADMIN — ROCURONIUM BROMIDE 5 MG: 10 INJECTION, SOLUTION INTRAVENOUS at 10:03

## 2018-03-15 RX ADMIN — SODIUM CHLORIDE, SODIUM GLUCONATE, SODIUM ACETATE, POTASSIUM CHLORIDE, MAGNESIUM CHLORIDE, SODIUM PHOSPHATE, DIBASIC, AND POTASSIUM PHOSPHATE: .53; .5; .37; .037; .03; .012; .00082 INJECTION, SOLUTION INTRAVENOUS at 10:03

## 2018-03-15 RX ADMIN — PHENYLEPHRINE HYDROCHLORIDE 100 MCG: 10 INJECTION INTRAVENOUS at 11:03

## 2018-03-15 RX ADMIN — LIDOCAINE HYDROCHLORIDE 60 MG: 20 INJECTION, SOLUTION INTRAVENOUS at 10:03

## 2018-03-15 RX ADMIN — NEOSTIGMINE METHYLSULFATE 5 MG: 1 INJECTION INTRAVENOUS at 01:03

## 2018-03-15 RX ADMIN — ROCURONIUM BROMIDE 50 MG: 10 INJECTION, SOLUTION INTRAVENOUS at 11:03

## 2018-03-15 RX ADMIN — SUCCINYLCHOLINE CHLORIDE 110 MG: 20 INJECTION, SOLUTION INTRAMUSCULAR; INTRAVENOUS at 10:03

## 2018-03-15 RX ADMIN — PHENYLEPHRINE HYDROCHLORIDE 100 MCG: 10 INJECTION INTRAVENOUS at 12:03

## 2018-03-15 RX ADMIN — DEXAMETHASONE SODIUM PHOSPHATE 4 MG: 4 INJECTION, SOLUTION INTRAMUSCULAR; INTRAVENOUS at 10:03

## 2018-03-15 RX ADMIN — LIDOCAINE HYDROCHLORIDE 40 MG: 20 INJECTION, SOLUTION INTRAVENOUS at 12:03

## 2018-03-15 NOTE — H&P
Plastic Surgery History and Physical    HPI:  Ivelisse Aron Hay 57 y.o. female with macromastia here today for bilateral breast reduction.       PMH:  Past Medical History:   Diagnosis Date    Diarrhea     Gastritis     GERD (gastroesophageal reflux disease)     HLD (hyperlipidemia)     Hypertension     Impaired fasting blood sugar        PSH:  Past Surgical History:   Procedure Laterality Date    CYSTOSCOPY      NEPHROSTOMY TUBE      CYSTOSCOPY WITH STENT    SHOULDER SURGERY  9-17-13    right    URETERAL STENT PLACEMENT      THEN REMOVED       SH:  Tobacco:   History   Smoking Status    Never Smoker   Smokeless Tobacco    Never Used     ETOH:   History   Alcohol Use No     Illicit Drugs:   History   Drug Use No       Medications    Current Facility-Administered Medications:     0.9%  NaCl infusion, , Intravenous, Continuous, Olivia Andres MD, Last Rate: 10 mL/hr at 03/15/18 0755    clindamycin 900 MG/50 ML D5W 900 mg/50 mL IVPB 900 mg, 900 mg, Intravenous, On Call Procedure, Teofilo Steve MD    heparin (porcine) injection 5,000 Units, 5,000 Units, Subcutaneous, Once, Teofilo Steve MD    lidocaine (PF) 10 mg/ml (1%) injection 10 mg, 1 mL, Intradermal, Once, Teofilo Steve MD    Physical Exam  General: NAD, Resting comfortably in bed  Chest: CTA Bl  Heart: RRR  Abd: Soft, NT, ND  Breast: large ptotic breast    Diagnostics:  Lab Results   Component Value Date    WBC 7.93 12/05/2017    HGB 14.7 12/05/2017    HCT 44.9 12/05/2017    MCV 91 12/05/2017     12/05/2017     Lab Results   Component Value Date    CREATININE 0.8 12/05/2017    BUN 15 12/05/2017     12/05/2017    K 3.9 12/05/2017     12/05/2017    CO2 28 12/05/2017     Lab Results   Component Value Date    ALT 62 (H) 12/05/2017    AST 29 12/05/2017    ALKPHOS 114 12/05/2017    BILITOT 0.7 12/05/2017     Lab Results   Component Value Date    ALBUMIN 4.2 12/05/2017         Assessment/Plan  Ivelisse Sharp Satnam 57  y.o. female with macromastia    1. Plan for OR today for bilateral breast reduction

## 2018-03-15 NOTE — ANESTHESIA POSTPROCEDURE EVALUATION
"Anesthesia Post Evaluation    Patient: Ivelisse Hay    Procedure(s) Performed: Procedure(s) (LRB):  REDUCTION-MAMMOPLASTY-BILATERAL (CONSENT AM OF) (Bilateral)    Final Anesthesia Type: general  Patient location during evaluation: PACU  Patient participation: Yes- Able to Participate  Level of consciousness: awake and alert and oriented  Post-procedure vital signs: reviewed and stable  Pain management: adequate  Airway patency: patent  PONV status at discharge: No PONV  Anesthetic complications: no      Cardiovascular status: blood pressure returned to baseline  Respiratory status: unassisted and spontaneous ventilation  Hydration status: euvolemic  Follow-up not needed.        Visit Vitals  BP (!) 104/57   Pulse 61   Temp 36.7 °C (98.1 °F) (Oral)   Resp 13   Ht 5' 4" (1.626 m)   Wt 71.7 kg (158 lb)   SpO2 97%   Breastfeeding? No   BMI 27.12 kg/m²       Pain/Yoan Score: Pain Assessment Performed: Yes (3/15/2018  3:12 PM)  Presence of Pain: complains of pain/discomfort (3/15/2018  3:12 PM)  Pain Rating Prior to Med Admin: 8 (3/15/2018  2:25 PM)  Yoan Score: 8 (3/15/2018  3:12 PM)      "

## 2018-03-15 NOTE — DISCHARGE SUMMARY
[facility]  Discharge Note  Short Stay    Admit Date: 3/15/2018    Discharge Date and Time: No discharge date for patient encounter.     Attending Physician: Raad Rodriguez, *     Discharge Provider: Cat Cary    Diagnoses:  Active Hospital Problems    Diagnosis  POA    Macromastia [N62]  Yes      Resolved Hospital Problems    Diagnosis Date Resolved POA    Preoperative testing [Z01.818] 03/15/2018 Not Applicable       Discharged Condition: good    Hospital Course: Patient was admitted for an outpatient procedure and tolerated the procedure well with no complications.    Final Diagnoses: Same as principal problem.    Disposition: Home or Self Care    Follow up/Patient Instructions:    Medications:  Reconciled Home Medications:   Current Discharge Medication List      START taking these medications    Details   cephALEXin (KEFLEX) 500 MG capsule Take 1 capsule (500 mg total) by mouth every 6 (six) hours.  Qty: 28 capsule, Refills: 0      oxyCODONE-acetaminophen (PERCOCET) 5-325 mg per tablet Take 1 tablet by mouth every 4 (four) hours as needed for Pain.  Qty: 28 tablet, Refills: 0         CONTINUE these medications which have NOT CHANGED    Details   aspirin (ECOTRIN) 81 MG EC tablet Take 81 mg by mouth once daily.      atorvastatin (LIPITOR) 20 MG tablet TAKE ONE TABLET BY MOUTH AT BEDTIME  Qty: 90 tablet, Refills: 2      fish oil-omega-3 fatty acids 300-1,000 mg capsule Take 2 g by mouth once daily.      losartan (COZAAR) 50 MG tablet TAKE 1 TABLET (50 MG TOTAL) BY MOUTH ONCE DAILY.  Qty: 90 tablet, Refills: 2      magnesium 30 mg Tab Take by mouth.      multivitamin capsule Take 1 capsule by mouth once daily.      potassium chloride (KLOR-CON) 8 MEQ TbSR Take 1 tablet (8 mEq total) by mouth 2 (two) times daily.  Qty: 180 tablet, Refills: 1      triamterene-hydrochlorothiazide 37.5-25 mg (DYAZIDE) 37.5-25 mg per capsule TAKE ONE CAPSULE BY MOUTH EVERY DAY  Qty: 90 capsule, Refills: 1       Lactobacillus rhamnosus GG (CULTURELLE) 10 billion cell capsule Take 1 capsule by mouth once daily.             Discharge Procedure Orders  Diet general     Shower on day dressing removed (No bath)   Order Comments: Shower in 48 hours then DAILY, with soap and water     Call MD for:  extreme fatigue     Call MD for:  persistent dizziness or light-headedness     Call MD for:  hives     Call MD for:  redness, tenderness, or signs of infection (pain, swelling, redness, odor or green/yellow discharge around incision site)     Call MD for:  difficulty breathing, headache or visual disturbances     Call MD for:  temperature >100.4     Call MD for:  severe uncontrolled pain     Call MD for:  persistent nausea and vomiting     Remove dressing in 48 hours   Order Comments: Wear bra at all times, other than shower. Remove yellow xerofrom from Bilateral nipples at 48 hours prior to shower. After shower, apply bacitracin to Bilateral nipples 3 times daily and cover with nonstick dressing     Wound care routine (specify)   Order Comments: Apply Bacitracin to Bilateral nipples 3 times daily and cover with nonstick dressing       Follow-up Information     Raad Rodriguez MD In 1 week.    Specialty:  Plastic Surgery  Contact information:  [address] [place] [id number]  [phone]                   Discharge Procedure Orders (must include Diet, Follow-up, Activity):    Discharge Procedure Orders (must include Diet, Follow-up, Activity)  Diet general     Shower on day dressing removed (No bath)   Order Comments: Shower in 48 hours then DAILY, with soap and water     Call MD for:  extreme fatigue     Call MD for:  persistent dizziness or light-headedness     Call MD for:  hives     Call MD for:  redness, tenderness, or signs of infection (pain, swelling, redness, odor or green/yellow discharge around incision site)     Call MD for:  difficulty breathing, headache or visual disturbances     Call MD for:   temperature >100.4     Call MD for:  severe uncontrolled pain     Call MD for:  persistent nausea and vomiting     Remove dressing in 48 hours   Order Comments: Wear bra at all times, other than shower. Remove yellow xerofrom from Bilateral nipples at 48 hours prior to shower. After shower, apply bacitracin to Bilateral nipples 3 times daily and cover with nonstick dressing     Wound care routine (specify)   Order Comments: Apply Bacitracin to Bilateral nipples 3 times daily and cover with nonstick dressing

## 2018-03-15 NOTE — DISCHARGE INSTRUCTIONS
Discharge Instructions: Caring for Your Krzysztof-Marley Drainage Tube  Your doctor discharges you with a Krzysztof-Marley drainage tube. Doctors commonly leave this drain within the abdominal cavity after surgery. It helps drain and collect blood and body fluid after surgery. This can prevent swelling and reduces the risk for infection. The tube is held in place by a few stitches. It is covered with a bandage. Your doctor will remove the drain when he or she determines you no longer need it.  Home care  · Dont sleep on the same side as the tube.  · Secure the tube and bag inside your clothing with a safety pin. This helps keep the tube from being pulled out.  · Empty your drain at least twice a day. Empty it more often if the drain is full. Wash  and dry your hands before emptying the drain.  ¨ Lift the opening on the drain.  ¨ Drain the fluid into a measuring cup.  ¨ Record the amount of fluid each time you empty the drain. Include the date and time it was emptied. Share this information with your doctor on your next visit.  ¨ Squeeze the bulb with your hands until you hear air coming out of the bulb if your doctor has instructed you to do so (sometimes the bulb is used as a reservoir without suction). Check with your doctor about specific drain instructions.  ¨ Close the opening.  · Change the dressing around the tube every day.  ¨ Wash your hands.  ¨ Remove the old bandage.  ¨ Wash your hands again.  ¨ Wet a cotton swab and clean the skin around the incision and tube site. Use normal saline solution (salt and water). Or, you can use warm, soapy water.  ¨ Put a new bandage on the incision and tube site. Make the bandage large enough to cover the whole incision area.  ¨ Tape the bandage in place.  · Keep the bandage and tube site dry when you shower. Ask your healthcare provider about the best way to do this.  · Stripping the tube helps keep blood clots from blocking the tube. Ask your nurse how often you should  strip the tube. Stripping may not be needed, depending on where and why your doctor placed the tube. It may even be dangerous in some cases.   ¨ Hold the tubing where it leaves the skin, with one hand. This keeps it from pulling on the skin.  ¨ Pinch the tubing with the thumb and first finger of your other hand.  ¨ Slowly and firmly pull your thumb and first finger down the tubing. You may find it helpful to hold an alcohol swab between your fingers and the tube to lubricate the tubing.  ¨ If the pulling hurts or feels like its coming out of the skin, stop. Begin again more gently.  Follow-up care  Make a follow-up appointment as directed by our staff.     When to seek medical care  Call your healthcare provider right away if you have any of the following:  · New or increased pain around the tube  · Redness, swelling, or warmth around the incision or tube  · Drainage that is foul-smelling  · Vomiting  · Fever of 100.4°F (38°C)  · Fluid leaking around the tube  · Incision seems not to be healing  · Stitches become loose  · Tube falls out or breaks  · Drainage that changes from light pink to dark red  · Blood clots in the drainage bulb  · A sudden increase or decrease in the amount of drainage (over 30 mL)   Date Last Reviewed: 2/1/2017  © 1045-4159 [employer]. [address] [place] [address], [place] [address]. All rights reserved. This information is not intended as a substitute for professional medical care. Always follow your healthcare professional's instructions.

## 2018-03-15 NOTE — BRIEF OP NOTE
[facility]  Brief Operative Note     SUMMARY     Surgery Date: 3/15/2018     Surgeon(s) and Role:     * Raad Rodriguez MD - Primary     * Teofilo Steve MD - Fellow    Assisting Surgeon: None    Pre-op Diagnosis:  Macromastia [N62]  Chronic neck pain [M54.2, G89.29]  Excoriated rash [R21]  Chronic bilateral low back pain, with sciatica presence unspecified [M54.5, G89.29]    Post-op Diagnosis:  Post-Op Diagnosis Codes:     * Macromastia [N62]     * Chronic neck pain [M54.2, G89.29]     * Excoriated rash [R21]     * Chronic bilateral low back pain, with sciatica presence unspecified [M54.5, G89.29]    Procedure(s) (LRB):  REDUCTION-MAMMOPLASTY-BILATERAL (CONSENT AM OF) (Bilateral)    Anesthesia: General    Description of the findings of the procedure: Planned bilateral breast reduction. Procedure as planned. No complications. Patient tolerated well    Findings/Key Components: patient admitted for planned outpatient surgery. Procedure as planned. No complications. Please see Op Note for procedure in detail    Estimated Blood Loss: * No values recorded between 3/15/2018 10:24 AM and 3/15/2018  1:11 PM *         Specimens:   Specimen (12h ago through future)    Start     Ordered    03/15/18 1027  Specimen to Pathology - Surgery  Once     Comments:  1.) Left breast tissue- Permanent.2.) Right breast tissue- Permanent.      03/15/18 1108          Discharge Note    SUMMARY     Admit Date: 3/15/2018    Discharge Date and Time:  03/15/2018 1:24 PM    Hospital Course (synopsis of major diagnoses, care, treatment, and services provided during the course of the hospital stay): Admitted for outpatient surgery, bilateral breast reduction. Procedure as planned. No complications.      Final Diagnosis: Post-Op Diagnosis Codes:     * Macromastia [N62]     * Chronic neck pain [M54.2, G89.29]     * Excoriated rash [R21]     * Chronic bilateral low back pain, with sciatica presence unspecified [M54.5,  G89.29]    Disposition: Home or Self Care    Follow Up/Patient Instructions:     Medications:  Reconciled Home Medications:   Current Discharge Medication List      START taking these medications    Details   cephALEXin (KEFLEX) 500 MG capsule Take 1 capsule (500 mg total) by mouth every 6 (six) hours.  Qty: 28 capsule, Refills: 0      oxyCODONE-acetaminophen (PERCOCET) 5-325 mg per tablet Take 1 tablet by mouth every 4 (four) hours as needed for Pain.  Qty: 28 tablet, Refills: 0         CONTINUE these medications which have NOT CHANGED    Details   aspirin (ECOTRIN) 81 MG EC tablet Take 81 mg by mouth once daily.      atorvastatin (LIPITOR) 20 MG tablet TAKE ONE TABLET BY MOUTH AT BEDTIME  Qty: 90 tablet, Refills: 2      fish oil-omega-3 fatty acids 300-1,000 mg capsule Take 2 g by mouth once daily.      losartan (COZAAR) 50 MG tablet TAKE 1 TABLET (50 MG TOTAL) BY MOUTH ONCE DAILY.  Qty: 90 tablet, Refills: 2      magnesium 30 mg Tab Take by mouth.      multivitamin capsule Take 1 capsule by mouth once daily.      potassium chloride (KLOR-CON) 8 MEQ TbSR Take 1 tablet (8 mEq total) by mouth 2 (two) times daily.  Qty: 180 tablet, Refills: 1      triamterene-hydrochlorothiazide 37.5-25 mg (DYAZIDE) 37.5-25 mg per capsule TAKE ONE CAPSULE BY MOUTH EVERY DAY  Qty: 90 capsule, Refills: 1      Lactobacillus rhamnosus GG (CULTURELLE) 10 billion cell capsule Take 1 capsule by mouth once daily.             Discharge Procedure Orders  Diet general     Shower on day dressing removed (No bath)   Order Comments: Shower in 48 hours then DAILY, with soap and water     Call MD for:  extreme fatigue     Call MD for:  persistent dizziness or light-headedness     Call MD for:  hives     Call MD for:  redness, tenderness, or signs of infection (pain, swelling, redness, odor or green/yellow discharge around incision site)     Call MD for:  difficulty breathing, headache or visual disturbances     Call MD for:  temperature >100.4      Call MD for:  severe uncontrolled pain     Call MD for:  persistent nausea and vomiting     Remove dressing in 48 hours   Order Comments: Wear bra at all times, other than shower. Remove yellow xerofrom from Bilateral nipples at 48 hours prior to shower. After shower, apply bacitracin to Bilateral nipples 3 times daily and cover with nonstick dressing     Wound care routine (specify)   Order Comments: Apply Bacitracin to Bilateral nipples 3 times daily and cover with nonstick dressing       Follow-up Information     Raad Rodriguez MD In 1 week.    Specialty:  Plastic Surgery  Contact information:  [facility] [address] [phone]

## 2018-03-15 NOTE — ANESTHESIA RELEASE NOTE
"Anesthesia Release from PACU Note    Patient: Ivelisse Hay    Procedure(s) Performed: Procedure(s) (LRB):  REDUCTION-MAMMOPLASTY-BILATERAL (CONSENT AM OF) (Bilateral)    Anesthesia type: general    Post pain: Adequate analgesia    Post assessment: no apparent anesthetic complications    Last Vitals:   Visit Vitals  BP (!) 104/57   Pulse 61   Temp 36.7 °C (98.1 °F) (Oral)   Resp 13   Ht 5' 4" (1.626 m)   Wt 71.7 kg (158 lb)   SpO2 97%   Breastfeeding? No   BMI 27.12 kg/m²       Post vital signs: stable    Level of consciousness: awake and alert     Nausea/Vomiting: no nausea/no vomiting    Complications: none    Airway Patency: patent    Respiratory: unassisted, spontaneous ventilation    Cardiovascular: stable and blood pressure at baseline    Hydration: euvolemic  "

## 2018-03-15 NOTE — TRANSFER OF CARE
"Anesthesia Transfer of Care Note    Patient: Ivelisse Hay    Procedure(s) Performed: Procedure(s) (LRB):  REDUCTION-MAMMOPLASTY-BILATERAL (CONSENT AM OF) (Bilateral)    Patient location: PACU    Anesthesia Type: general    Transport from OR: Transported from OR on room air with adequate spontaneous ventilation    Post pain: adequate analgesia    Post assessment: no apparent anesthetic complications    Post vital signs: stable    Level of consciousness: awake and alert    Nausea/Vomiting: no nausea/vomiting    Complications: none    Transfer of care protocol was followed      Last vitals:   Visit Vitals  BP (!) 92/53 (BP Location: Right arm, Patient Position: Lying)   Pulse 64   Temp 36.7 °C (98.1 °F) (Oral)   Resp 16   Ht 5' 4" (1.626 m)   Wt 71.7 kg (158 lb)   SpO2 96%   Breastfeeding? No   BMI 27.12 kg/m²     "

## 2018-03-15 NOTE — OP NOTE
DATE OF PROCEDURE:  03/15/2018    PREOPERATIVE DIAGNOSES:  1.  Macromastia.  2.  Chronic neck pain.  3.  Chronic back pain.  4.  Chronic macerating rashes.    POSTOPERATIVE DIAGNOSES:  1.  Macromastia.  2.  Chronic neck pain.  3.  Chronic back pain.  4.  Chronic macerating rashes.    PROCEDURE PERFORMED:  Bilateral breast reduction.    SURGEON:  Raad Rodriguez M.D., [facility]    ANESTHESIA:  General.    DESCRIPTION OF PROCEDURE:  The patient was evaluated in the preoperative holding   area.  In the standing position, markings were made for the inferior pedicle   breast reduction technique.  The patient was then taken to the Operating Room   and placed in the supine position.  After adequate general endotracheal   anesthesia, she was prepped and draped in the normal sterile fashion.  The new   nipple-areolar complex was marked measuring 38 mm.  The inferior pedicle was   marked measuring 8 cm at its base.  The inferior pedicle was deepithelialized.    Good punctate bleeding was noted.  The superomedial and superolateral flaps were   elevated to the clavicles.  The inferior pedicle was then deepithelialized.    Good punctate bleeding was noted.  The superomedial and superolateral flaps were   elevated to the clavicle superiorly, to the mid axillary line laterally, and   the lateral border of the sternum medially.  The medial and lateral skin wedges   were excised.  The inferior pedicle was debulked.  A similar procedure was   performed on the opposite side.  I personally performed both sides of the   operation.  The incisions were then closed using interrupted 3-0 Monocryl   followed by a running 4-0 Monocryl subcuticular suture for the inframammary   incision.  The vertical limb and nipple areolar complex were closed using   interrupted 4-0 Monocryl followed by a running 4-0 Monocryl subcuticular suture.    There were no complications with this procedure.      CRB/HN  dd: 03/15/2018 15:18:54 (CDT)  td: 03/15/2018  18:40:37 (CDT)  Doc ID   #[id number]  Job ID #[id number]    CC:

## 2018-03-15 NOTE — ANESTHESIA PROCEDURE NOTES
Pec Single Injection Block(s)    Patient location during procedure: pre-op   Block not for primary anesthetic.  Reason for block: at surgeon's request and post-op pain management   Post-op Pain Location: Bilateral Breast Pain  Start time: 3/15/2018 8:37 AM  Timeout: 3/15/2018 8:37 AM   End time: 3/15/2018 8:43 AM  Staffing  Anesthesiologist: ISADORA SINGLETARY  Resident/CRNA: JOSEPH MCQUEEN  Performed: resident/CRNA   Preanesthetic Checklist  Completed: patient identified, site marked, surgical consent, pre-op evaluation, timeout performed, IV checked, risks and benefits discussed and monitors and equipment checked  Peripheral Block  Patient position: supine  Prep: ChloraPrep  Patient monitoring: heart rate, cardiac monitor, continuous pulse ox, continuous capnometry and frequent blood pressure checks  Block type: pectoral  Laterality: bilateral  Injection technique: single shot  Needle  Needle type: Stimuplex   Needle gauge: 21 G  Needle length: 4 in  Needle localization: anatomical landmarks and ultrasound guidance   -ultrasound image captured on disc.  Assessment  Injection assessment: negative aspiration, negative parasthesia and local visualized surrounding nerve  Paresthesia pain: none  Heart rate change: no  Slow fractionated injection: yes  Medications:  Bolus administered: 50 mL of 0.25 ropivacaine (plus clonidine 50mcg & dexamethasone 1mg per 20ml)  Epinephrine added: 3.75 mcg/mL (1/300,000)  Additional Notes  Performed bilateral PEC I blocks. 25cc of local each side.   VSS.  [facility] RN monitoring vitals throughout procedure.  Patient tolerated procedure well.

## 2018-03-21 ENCOUNTER — OFFICE VISIT (OUTPATIENT)
Dept: PLASTIC SURGERY | Facility: CLINIC | Age: 58
End: 2018-03-21
Payer: MEDICARE

## 2018-03-21 VITALS
SYSTOLIC BLOOD PRESSURE: 130 MMHG | DIASTOLIC BLOOD PRESSURE: 78 MMHG | HEIGHT: 64 IN | WEIGHT: 158 LBS | BODY MASS INDEX: 26.98 KG/M2 | HEART RATE: 78 BPM | TEMPERATURE: 98 F

## 2018-03-21 DIAGNOSIS — Z09 SURGERY FOLLOW-UP EXAMINATION: Primary | ICD-10-CM

## 2018-03-21 PROCEDURE — 99024 POSTOP FOLLOW-UP VISIT: CPT | Mod: S$GLB,,, | Performed by: SURGERY

## 2018-03-21 PROCEDURE — 99999 PR PBB SHADOW E&M-EST. PATIENT-LVL III: CPT | Mod: PBBFAC,,, | Performed by: SURGERY

## 2018-03-21 NOTE — PROGRESS NOTES
I personally have examined the patient and agree with the resident's findings and plan of action.  Looks good.  Nillples viable. Incisions healing well.  RTC in 1 week

## 2018-03-21 NOTE — PROGRESS NOTES
HPI:  The patient is status post bilateral breast reduction on 3/15/18. She is doing well. Her painis well controlled. She denies any fevers or chills. Her drains have put our 66 and 40 mL yesterday of serosanguinous fluid.    PHYSICAL EXAM:  Physical Exam   Constitutional: She is oriented to person, place, and time. She appears well-developed and well-nourished. No distress.   HENT:   Head: Normocephalic and atraumatic.   Eyes: EOM are normal. Pupils are equal, round, and reactive to light.   Neck: Normal range of motion. Neck supple.   Cardiovascular: Normal rate and regular rhythm.    Pulmonary/Chest: Effort normal. No respiratory distress.   Abdominal: Soft. She exhibits no distension.   Musculoskeletal: Normal range of motion.   Neurological: She is alert and oriented to person, place, and time.   Skin: Skin is warm and dry. She is not diaphoretic.   OG drains with serosanguinous fluid  Bilateral incisions CDI, no erythema, swelling, or drainage   Psychiatric: She has a normal mood and affect. Her behavior is normal. Judgment and thought content normal.   Nursing note and vitals reviewed.      ASSESSMENT:    The patient is doing well after surgery.     PLAN:    Follow up in 1 week for drain and wound check.

## 2018-03-28 ENCOUNTER — OFFICE VISIT (OUTPATIENT)
Dept: PLASTIC SURGERY | Facility: CLINIC | Age: 58
End: 2018-03-28
Payer: MEDICARE

## 2018-03-28 VITALS
HEIGHT: 64 IN | HEART RATE: 75 BPM | SYSTOLIC BLOOD PRESSURE: 151 MMHG | BODY MASS INDEX: 26.98 KG/M2 | DIASTOLIC BLOOD PRESSURE: 82 MMHG | WEIGHT: 158 LBS

## 2018-03-28 DIAGNOSIS — Z09 SURGERY FOLLOW-UP EXAMINATION: Primary | ICD-10-CM

## 2018-03-28 PROCEDURE — 99024 POSTOP FOLLOW-UP VISIT: CPT | Mod: S$GLB,,, | Performed by: SURGERY

## 2018-03-28 PROCEDURE — 99999 PR PBB SHADOW E&M-EST. PATIENT-LVL III: CPT | Mod: PBBFAC,,, | Performed by: SURGERY

## 2018-03-28 NOTE — PROGRESS NOTES
Ivelisse Hay is status post bilateral breast reduction.  She has done very well.    Her drains were removed.  Everything looks good.  See her back in two weeks.      CRB/HN  dd: 03/28/2018 16:19:19 (CDT)  td: 03/29/2018 12:09:46 (CDT)  Doc ID   #[id number]  Job ID #[id number]    CC:

## 2018-04-11 ENCOUNTER — OFFICE VISIT (OUTPATIENT)
Dept: PLASTIC SURGERY | Facility: CLINIC | Age: 58
End: 2018-04-11
Payer: MEDICARE

## 2018-04-11 VITALS
BODY MASS INDEX: 29.23 KG/M2 | TEMPERATURE: 98 F | SYSTOLIC BLOOD PRESSURE: 134 MMHG | WEIGHT: 171.19 LBS | HEIGHT: 64 IN | HEART RATE: 80 BPM | DIASTOLIC BLOOD PRESSURE: 86 MMHG

## 2018-04-11 DIAGNOSIS — Z09 SURGERY FOLLOW-UP EXAMINATION: Primary | ICD-10-CM

## 2018-04-11 PROCEDURE — 99999 PR PBB SHADOW E&M-EST. PATIENT-LVL III: CPT | Mod: PBBFAC,,, | Performed by: SURGERY

## 2018-04-11 PROCEDURE — 99024 POSTOP FOLLOW-UP VISIT: CPT | Mod: S$GLB,,, | Performed by: SURGERY

## 2018-04-11 NOTE — PROGRESS NOTES
Ms. Hay presents to the [facility] Surgery [facility] one month after her bilateral   breast reduction.  I think she has a beautiful result.  Her breasts are upright.    They look great.  Incisions are healing really nicely.  The patient has   multitude of complaints.  She states that she has a little more excess fat on   the right axillary region, which is really not part of the breast, whatsoever   than she does on the right side.  This is true.  She has a very small amount.    The patient states that the left breast is slightly bigger than is the right   breast.  I stated we attempt to get the breasts as close as we can to match but   it is not always the same size.  In looking at her breasts, they are extremely   close in size, but she is unhappy with this.  She also stated that the nipples   do not look exactly the same.  I think she has a nice result and I think overall   she has a very good result.  Nonetheless, I discussed with her that if she   wants I could do a small amount of tulip liposuction in the clinic on both the   left axillary region and the left breast.  I think the nipples should settle   down and be fine.  I do not really see much of a problem with them, but we will   wait and see.      SAIRA/ALEX  dd: 04/11/2018 15:17:59 (CDT)  td: 04/12/2018 11:56:46 (CDT)  Doc ID   #[id number]  Job ID #[id number]    CC:

## 2018-05-24 RX ORDER — LOSARTAN POTASSIUM 50 MG/1
TABLET ORAL
Qty: 90 TABLET | Refills: 1 | Status: SHIPPED | OUTPATIENT
Start: 2018-05-24 | End: 2018-05-27 | Stop reason: SDUPTHER

## 2018-05-27 RX ORDER — LOSARTAN POTASSIUM 50 MG/1
TABLET ORAL
Qty: 90 TABLET | Refills: 1 | Status: SHIPPED | OUTPATIENT
Start: 2018-05-27 | End: 2018-11-16 | Stop reason: SDUPTHER

## 2018-06-08 ENCOUNTER — PATIENT MESSAGE (OUTPATIENT)
Dept: ADMINISTRATIVE | Facility: OTHER | Age: 58
End: 2018-06-08

## 2018-06-13 ENCOUNTER — OFFICE VISIT (OUTPATIENT)
Dept: PLASTIC SURGERY | Facility: CLINIC | Age: 58
End: 2018-06-13
Payer: MEDICARE

## 2018-06-13 VITALS
WEIGHT: 158.94 LBS | TEMPERATURE: 98 F | HEART RATE: 75 BPM | DIASTOLIC BLOOD PRESSURE: 86 MMHG | BODY MASS INDEX: 27.13 KG/M2 | HEIGHT: 64 IN | SYSTOLIC BLOOD PRESSURE: 142 MMHG

## 2018-06-13 DIAGNOSIS — Z09 SURGERY FOLLOW-UP EXAMINATION: Primary | ICD-10-CM

## 2018-06-13 PROCEDURE — 99024 POSTOP FOLLOW-UP VISIT: CPT | Mod: S$GLB,,, | Performed by: SURGERY

## 2018-06-13 PROCEDURE — 99999 PR PBB SHADOW E&M-EST. PATIENT-LVL III: CPT | Mod: PBBFAC,,, | Performed by: SURGERY

## 2018-06-13 RX ORDER — ZOSTER VACCINE RECOMBINANT, ADJUVANTED 50 MCG/0.5
KIT INTRAMUSCULAR
COMMUNITY
Start: 2018-05-15 | End: 2023-03-08 | Stop reason: ALTCHOICE

## 2018-06-13 RX ORDER — CLOSTRIDIUM TETANI TOXOID ANTIGEN (FORMALDEHYDE INACTIVATED), CORYNEBACTERIUM DIPHTHERIAE TOXOID ANTIGEN (FORMALDEHYDE INACTIVATED), BORDETELLA PERTUSSIS TOXOID ANTIGEN (GLUTARALDEHYDE INACTIVATED), BORDETELLA PERTUSSIS FILAMENTOUS HEMAGGLUTININ ANTIGEN (FORMALDEHYDE INACTIVATED), BORDETELLA PERTUSSIS PERTACTIN ANTIGEN, AND BORDETELLA PERTUSSIS FIMBRIAE 2/3 ANTIGEN 5; 2; 2.5; 5; 3; 5 [LF]/.5ML; [LF]/.5ML; UG/.5ML; UG/.5ML; UG/.5ML; UG/.5ML
INJECTION, SUSPENSION INTRAMUSCULAR
COMMUNITY
Start: 2018-05-15 | End: 2021-08-24 | Stop reason: ALTCHOICE

## 2018-07-17 ENCOUNTER — TELEPHONE (OUTPATIENT)
Dept: PLASTIC SURGERY | Facility: CLINIC | Age: 58
End: 2018-07-17

## 2018-07-23 DIAGNOSIS — E65 FAT DEPOSITS: Primary | ICD-10-CM

## 2018-07-24 ENCOUNTER — PROCEDURE VISIT (OUTPATIENT)
Dept: PLASTIC SURGERY | Facility: CLINIC | Age: 58
End: 2018-07-24
Payer: MEDICARE

## 2018-07-24 VITALS
RESPIRATION RATE: 18 BRPM | WEIGHT: 162.94 LBS | HEART RATE: 71 BPM | SYSTOLIC BLOOD PRESSURE: 160 MMHG | BODY MASS INDEX: 27.82 KG/M2 | DIASTOLIC BLOOD PRESSURE: 78 MMHG | HEIGHT: 64 IN | TEMPERATURE: 98 F

## 2018-07-24 DIAGNOSIS — Z09 SURGERY FOLLOW-UP EXAMINATION: Primary | ICD-10-CM

## 2018-07-24 RX ORDER — OXYCODONE AND ACETAMINOPHEN 5; 325 MG/1; MG/1
1 TABLET ORAL EVERY 6 HOURS PRN
Qty: 12 TABLET | Refills: 0 | Status: SHIPPED | OUTPATIENT
Start: 2018-07-24 | End: 2018-12-19 | Stop reason: ALTCHOICE

## 2018-07-25 NOTE — PROCEDURES
Ivelisse Hay presents to the minor surgery room for a tulip liposuction and free   fat transfer.  The patient had a bilateral breast reduction.  She has a little   bit of hollowness on the right upper pole laterally.  Her breasts looked very,   very, good, this is a very small difference between the right and left breasts.    I discussed with the patient that we would go ahead and remove fat from the   axillary region, which again is not part of the breast or breast reduction.  We   will go ahead and transfer that fat to the right breast in the upper pole.  I   also discussed with her that she might not indeed have enough fat in this region   to fill that.  I also marked areas in her abdomen.  She has much more fat on   the left upper flank with upper subcostal region on the left side than the   right.  The procedure was explained in detail as well as the risks and possible   complications including, but not limited to infection, bleeding, scarring,   hematoma formation, failure to achieve the patient's aesthetic goals.  The   patient signed the informed consent.  The patient was placed in the supine   position.  Tumescent fluid was used to infiltrate both the right and left   axillary regions as well as the small amount in the breast.  Also, the tumescent   fluid was instilled into the subcostal region on the left if additional fat was   needed.  Tulip suction proceeded.  The fat was then washed and strained.  It   was loaded into syringes.  We did have to go a small amount into the left   subcostal region.  The total volume of fat that was harvested after washing was   40 mL.  This was loaded into syringes and injected into the upper pole of the   right breast.  Incisions were closed using 6-0 nylon as well as glue.  Since, we   were unable to put her bra on because of the fat injections, some pressure   dressings were applied over the sites.  There were no complications with this   procedure.  The patient will  follow up in a week.      CRB/HN  dd: 07/25/2018 09:10:52 (CDT)  td: 07/25/2018 12:18:51 (CDT)  Doc ID   #[id number]  Job ID #[id number]    CC:

## 2018-08-01 ENCOUNTER — TELEPHONE (OUTPATIENT)
Dept: PLASTIC SURGERY | Facility: CLINIC | Age: 58
End: 2018-08-01

## 2018-08-01 ENCOUNTER — OFFICE VISIT (OUTPATIENT)
Dept: PLASTIC SURGERY | Facility: CLINIC | Age: 58
End: 2018-08-01
Payer: MEDICARE

## 2018-08-01 VITALS
SYSTOLIC BLOOD PRESSURE: 153 MMHG | WEIGHT: 163.94 LBS | HEART RATE: 73 BPM | DIASTOLIC BLOOD PRESSURE: 89 MMHG | HEIGHT: 64 IN | TEMPERATURE: 98 F | BODY MASS INDEX: 27.99 KG/M2

## 2018-08-01 DIAGNOSIS — Z09 SURGERY FOLLOW-UP EXAMINATION: Primary | ICD-10-CM

## 2018-08-01 PROCEDURE — 99024 POSTOP FOLLOW-UP VISIT: CPT | Mod: S$GLB,,, | Performed by: SURGERY

## 2018-08-01 PROCEDURE — 99999 PR PBB SHADOW E&M-EST. PATIENT-LVL III: CPT | Mod: PBBFAC,,, | Performed by: SURGERY

## 2018-08-01 NOTE — TELEPHONE ENCOUNTER
Left VM with my direct contact information, patient advised to give a return call with any questions or concerns regarding scheduling 3 week F/U appointment

## 2018-08-01 NOTE — PROGRESS NOTES
SUBJECTIVE:  Ms. Hay [facility] after her revision   of her breast reduction.  The patient had a breast reduction.  I think she had   an excellent result and still do think so.  She was complaining of, in the   posterior axillary region on the left side, the extra thickness of the skin with   subcutaneous fat in that region.  Again, this is not part of the breast.  We   went ahead and took her to the ____.  She was also complaining of a small   hollowness in the right breast, which made the right breast smaller than the   left.  Again, this is only a very minimal amount.  She had been taken to the   minor surgery room where under straight local anesthesia, tumescent fluid, we   harvested flap and transferred it to the right breast.  Also, liposuction to the   left axillary region, again, not part of the breast and the right axillary   region, again, not part of the breast, was performed as well as the left   subcostal region to get a little bit more fat.  She had difficulty during the   procedure, especially in the lateral portions of the fat aspiration.  We only   were able to aspirate about 40 mL after we washed it.  This was loaded into   syringe and injected into that exact very small area of hollowness in the right   lateral breast.  She presented today again.  She stated that she was very   bruised on the left side and indeed she has only moderate bruising.  She had   aggressive liposuction in that area.  She is also complaining of some firmness   where we injected the fat.  I discussed with her that the bruising on the left   lateral axillary region would go away.  It would probably take two to three   weeks.  I discussed with her that the hardness in that area would also go away.    She complained of the firmness of the area that the fat injection was in.  I   discussed with her.  She is just recently postop.  She needs to wait.  We did   very small aliquots of fat throughout  "that area.  There was no "lump injection".    So, we will have to wait and see.  She will return to the clinic in two weeks.    Sutures were removed.      SAIRA/ALEX  dd: 08/01/2018 18:31:49 (CDT)  td: 08/01/2018 21:02:52 (CDT)  Doc ID   #[id number]  Job ID #[id number]    CC:       "

## 2018-08-02 ENCOUNTER — TELEPHONE (OUTPATIENT)
Dept: PLASTIC SURGERY | Facility: CLINIC | Age: 58
End: 2018-08-02

## 2018-08-20 NOTE — PROGRESS NOTES
Pt doing well, has a very nice result  Will need free fat transfer to R superior breast in minor room  Office staff to call and schedule

## 2018-08-21 ENCOUNTER — TELEPHONE (OUTPATIENT)
Dept: INTERNAL MEDICINE | Facility: CLINIC | Age: 58
End: 2018-08-21

## 2018-08-21 DIAGNOSIS — I10 ESSENTIAL HYPERTENSION: Primary | ICD-10-CM

## 2018-08-21 DIAGNOSIS — R73.01 IMPAIRED FASTING GLUCOSE: ICD-10-CM

## 2018-08-21 DIAGNOSIS — E78.5 HYPERLIPIDEMIA, UNSPECIFIED HYPERLIPIDEMIA TYPE: ICD-10-CM

## 2018-08-21 NOTE — TELEPHONE ENCOUNTER
----- Message from Bhumi Kovacs sent at 8/21/2018  2:51 PM CDT -----  Contact: Pt Mobile [phone] or Home [phone]  Doctor appointment and lab have been scheduled.  Please link lab orders to the lab appointment.  Date of doctor appointment:  12/19/2018  Physical or EP: Ep    Date of lab appointment:  12/12/2018

## 2018-08-21 NOTE — TELEPHONE ENCOUNTER
----- Message from Anastasia Dickens sent at 8/21/2018 10:09 AM CDT -----  Contact: self/[phone]  Pt called in regards to getting a epp appointment with the dr after 12-13. She would like any day and anytime after 11:00 am. I tried to schedule but not time was available.       Please advise

## 2018-08-21 NOTE — TELEPHONE ENCOUNTER
Pt called. No answer. Left message advising that she has been scheduled for Wednesday, 12/19/18 at 3:00 PM.

## 2018-09-22 RX ORDER — TRIAMTERENE AND HYDROCHLOROTHIAZIDE 37.5; 25 MG/1; MG/1
CAPSULE ORAL
Qty: 90 CAPSULE | Refills: 0 | Status: SHIPPED | OUTPATIENT
Start: 2018-09-22 | End: 2018-12-19 | Stop reason: SDUPTHER

## 2018-11-16 RX ORDER — LOSARTAN POTASSIUM 50 MG/1
TABLET ORAL
Qty: 90 TABLET | Refills: 0 | Status: SHIPPED | OUTPATIENT
Start: 2018-11-16 | End: 2018-11-20 | Stop reason: SDUPTHER

## 2018-11-20 RX ORDER — LOSARTAN POTASSIUM 50 MG/1
TABLET ORAL
Qty: 90 TABLET | Refills: 0 | Status: SHIPPED | OUTPATIENT
Start: 2018-11-20 | End: 2018-12-19 | Stop reason: SDUPTHER

## 2018-12-12 ENCOUNTER — LAB VISIT (OUTPATIENT)
Dept: LAB | Facility: HOSPITAL | Age: 58
End: 2018-12-12
Attending: INTERNAL MEDICINE
Payer: MEDICARE

## 2018-12-12 DIAGNOSIS — I10 ESSENTIAL HYPERTENSION: ICD-10-CM

## 2018-12-12 DIAGNOSIS — R73.01 IMPAIRED FASTING GLUCOSE: ICD-10-CM

## 2018-12-12 DIAGNOSIS — E78.5 HYPERLIPIDEMIA, UNSPECIFIED HYPERLIPIDEMIA TYPE: ICD-10-CM

## 2018-12-12 LAB
ALBUMIN SERPL BCP-MCNC: 4.4 G/DL
ALP SERPL-CCNC: 121 U/L
ALT SERPL W/O P-5'-P-CCNC: 55 U/L
ANION GAP SERPL CALC-SCNC: 11 MMOL/L
AST SERPL-CCNC: 24 U/L
BASOPHILS # BLD AUTO: 0.05 K/UL
BASOPHILS NFR BLD: 0.6 %
BILIRUB SERPL-MCNC: 0.8 MG/DL
BUN SERPL-MCNC: 19 MG/DL
CALCIUM SERPL-MCNC: 10 MG/DL
CHLORIDE SERPL-SCNC: 101 MMOL/L
CHOLEST SERPL-MCNC: 225 MG/DL
CHOLEST/HDLC SERPL: 3.2 {RATIO}
CO2 SERPL-SCNC: 28 MMOL/L
CREAT SERPL-MCNC: 0.8 MG/DL
DIFFERENTIAL METHOD: NORMAL
EOSINOPHIL # BLD AUTO: 0.2 K/UL
EOSINOPHIL NFR BLD: 2.2 %
ERYTHROCYTE [DISTWIDTH] IN BLOOD BY AUTOMATED COUNT: 12.2 %
EST. GFR  (AFRICAN AMERICAN): >60 ML/MIN/1.73 M^2
EST. GFR  (NON AFRICAN AMERICAN): >60 ML/MIN/1.73 M^2
ESTIMATED AVG GLUCOSE: 111 MG/DL
GLUCOSE SERPL-MCNC: 105 MG/DL
HBA1C MFR BLD HPLC: 5.5 %
HCT VFR BLD AUTO: 45.1 %
HDLC SERPL-MCNC: 71 MG/DL
HDLC SERPL: 31.6 %
HGB BLD-MCNC: 14.7 G/DL
IMM GRANULOCYTES # BLD AUTO: 0.03 K/UL
IMM GRANULOCYTES NFR BLD AUTO: 0.4 %
LDLC SERPL CALC-MCNC: 113.4 MG/DL
LYMPHOCYTES # BLD AUTO: 2.6 K/UL
LYMPHOCYTES NFR BLD: 30.8 %
MCH RBC QN AUTO: 29.3 PG
MCHC RBC AUTO-ENTMCNC: 32.6 G/DL
MCV RBC AUTO: 90 FL
MONOCYTES # BLD AUTO: 0.7 K/UL
MONOCYTES NFR BLD: 8.7 %
NEUTROPHILS # BLD AUTO: 4.7 K/UL
NEUTROPHILS NFR BLD: 57.3 %
NONHDLC SERPL-MCNC: 154 MG/DL
NRBC BLD-RTO: 0 /100 WBC
PLATELET # BLD AUTO: 236 K/UL
PMV BLD AUTO: 10.7 FL
POTASSIUM SERPL-SCNC: 3.8 MMOL/L
PROT SERPL-MCNC: 7.8 G/DL
RBC # BLD AUTO: 5.02 M/UL
SODIUM SERPL-SCNC: 140 MMOL/L
TRIGL SERPL-MCNC: 203 MG/DL
TSH SERPL DL<=0.005 MIU/L-ACNC: 1.42 UIU/ML
WBC # BLD AUTO: 8.27 K/UL

## 2018-12-12 PROCEDURE — 80053 COMPREHEN METABOLIC PANEL: CPT

## 2018-12-12 PROCEDURE — 36415 COLL VENOUS BLD VENIPUNCTURE: CPT | Mod: PO

## 2018-12-12 PROCEDURE — 84443 ASSAY THYROID STIM HORMONE: CPT

## 2018-12-12 PROCEDURE — 80061 LIPID PANEL: CPT

## 2018-12-12 PROCEDURE — 85025 COMPLETE CBC W/AUTO DIFF WBC: CPT

## 2018-12-12 PROCEDURE — 83036 HEMOGLOBIN GLYCOSYLATED A1C: CPT

## 2018-12-13 ENCOUNTER — TELEPHONE (OUTPATIENT)
Dept: INTERNAL MEDICINE | Facility: CLINIC | Age: 58
End: 2018-12-13

## 2018-12-13 NOTE — TELEPHONE ENCOUNTER
----- Message from Cristal Enriquez MD sent at 12/12/2018  8:13 PM CST -----  Please review your lab work and we will discuss at your pending office visit.  Cristal Tejeda

## 2018-12-17 NOTE — PROGRESS NOTES
57 yo female presents for  Annual PE  Nonsmoker  Social ETOH    HEALTH MAINT:  Chol/lab, reviewed-  C-scope,9/2012  Impression: - normal,  7 years   WWE, 4/2012, pap- normal, asx, pt declined update today  Mammo, pending 1/2018  BMD, 2016  EYE exam, UTD  DDS exam,UTD    VACCINATIONS:  Tdap, 12/31/2013  Flu, yearly    MedCard reviewed.   REVIEW OF SYSTEMS:   CONSTITUTIONAL: No fever, no chills, no night sweats.   EYES: No double vision or itchy watery eyes.   ENT: No ear tinnitus. No sinus pain or pressure.  ++ olfactory hallucinations, smells odors that no one else does, in particular smoke- denied HA or URI or allergy sx; intermittent, like something burning- @ home or gym  No focal deficits or neurologic sx  No left ear/jaw discomfort ;  CARDIOVASCULAR: No angina or palpitations.   No claudication w/ exercise-walking  RESPIRATORY: No cough or wheezing.   GI: Occ. indigestion or heartburn. Recent episode of diarrhea x several days - ALT elevated w/ nl AST  No metallic tastes   No blood in her stool or urine.  GYN: The patient's last menstrual cycle was around 1/2011   NEUROLOGICAL: No unusual headaches. No focal deficits.  MS: s/p right and left shoulder surgery, occ problematic;better w/ breast reduction   neck pain,   Left 5th digit numbness, radiating from the elbow, she doesn't think its the neck  SKIN: LE bronzing chronic, no improvement off amlodipine, intermittent LLE numbness  Remainder of review negative except as previously noted.         PHYSICAL EXAM:   VS: stable  GENERAL: She is alert and oriented in no acute distress. WDWN, conversant and cooperative. Pleasant pt  EYES: Conjunctivae and lids are okay. Pupils are reactive.   ENT: Hearing intact, canals w/o significant cerumen on left  + cerumen on right, TM's visualized unremarkable  Nasal mucosa/turbinates injected w/o exudate  Oropharynx is injected w/o exudate   Sinus NT.   NECK: Supple. No thyromegaly or lymphadenopathy noted  RESPIRATORY: Efforts  are unlabored.   LUNGS: Clear to auscultation.   HEART: Regular rate and rhythm. No carotid bruits,   1+ pedal pulses, no edema.   ABDOMEN: Bowel sounds present, soft, and nontender. No hepatosplenomegaly.  MS: Gait nl, No CCE  NEURO: CARRILLO. No tremor noted  + ulnar paresthesias w/ tapping @ ulna @ median elbow location  SKIN: Warm and dry; + LE bronzing      IMPRESSION:  Annual PE  FHX: DM  HTN, stable  HLD. Elevated LDL  Aortic atherosclerosis, asx  IFBS, stable  LE bronzing  Olfactory hallucinations, persistent, w/up non dx, intermittent  Elevated LFT's, w/ weight gain since last appt  Paresthesias, ulna distribution    PLAN:  Mammo  Diet  Exercise  Wt loss, needs to lose minimum 5-10# to see if liver enzymes improve  US abdomen  Pap-pt declined today  Ulna paresthesias, declined w/up @ present, will try to cushion @ night ( sleeps on left side)  Continue present meds , pt now taking OTC potassium 99 in place of Rx  Rx updated  Call prn  RTC 6 mos

## 2018-12-19 ENCOUNTER — OFFICE VISIT (OUTPATIENT)
Dept: INTERNAL MEDICINE | Facility: CLINIC | Age: 58
End: 2018-12-19
Payer: MEDICARE

## 2018-12-19 VITALS
SYSTOLIC BLOOD PRESSURE: 122 MMHG | BODY MASS INDEX: 29.06 KG/M2 | DIASTOLIC BLOOD PRESSURE: 68 MMHG | WEIGHT: 170.19 LBS | HEART RATE: 87 BPM | RESPIRATION RATE: 18 BRPM | HEIGHT: 64 IN | OXYGEN SATURATION: 98 % | TEMPERATURE: 99 F

## 2018-12-19 DIAGNOSIS — R20.2 LEFT HAND PARESTHESIA: ICD-10-CM

## 2018-12-19 DIAGNOSIS — E78.5 HYPERLIPIDEMIA, UNSPECIFIED HYPERLIPIDEMIA TYPE: ICD-10-CM

## 2018-12-19 DIAGNOSIS — Z12.31 ENCOUNTER FOR SCREENING MAMMOGRAM FOR BREAST CANCER: ICD-10-CM

## 2018-12-19 DIAGNOSIS — Z00.00 ANNUAL PHYSICAL EXAM: Primary | ICD-10-CM

## 2018-12-19 DIAGNOSIS — R44.2 OLFACTORY HALLUCINATIONS: ICD-10-CM

## 2018-12-19 DIAGNOSIS — R79.89 ELEVATED LFTS: ICD-10-CM

## 2018-12-19 DIAGNOSIS — I10 ESSENTIAL HYPERTENSION: ICD-10-CM

## 2018-12-19 DIAGNOSIS — R73.01 IMPAIRED FASTING GLUCOSE: ICD-10-CM

## 2018-12-19 DIAGNOSIS — I70.0 AORTIC ATHEROSCLEROSIS: ICD-10-CM

## 2018-12-19 PROCEDURE — 3074F SYST BP LT 130 MM HG: CPT | Mod: CPTII,S$GLB,, | Performed by: INTERNAL MEDICINE

## 2018-12-19 PROCEDURE — 99396 PREV VISIT EST AGE 40-64: CPT | Mod: S$GLB,,, | Performed by: INTERNAL MEDICINE

## 2018-12-19 PROCEDURE — 99999 PR PBB SHADOW E&M-EST. PATIENT-LVL III: CPT | Mod: PBBFAC,,, | Performed by: INTERNAL MEDICINE

## 2018-12-19 PROCEDURE — 99499 UNLISTED E&M SERVICE: CPT | Mod: S$GLB,,, | Performed by: INTERNAL MEDICINE

## 2018-12-19 PROCEDURE — 3078F DIAST BP <80 MM HG: CPT | Mod: CPTII,S$GLB,, | Performed by: INTERNAL MEDICINE

## 2018-12-19 RX ORDER — POTASSIUM CHLORIDE 600 MG/1
8 TABLET, FILM COATED, EXTENDED RELEASE ORAL 2 TIMES DAILY
Qty: 180 TABLET | Refills: 3 | Status: SHIPPED | OUTPATIENT
Start: 2018-12-19 | End: 2018-12-20 | Stop reason: ALTCHOICE

## 2018-12-19 RX ORDER — ATORVASTATIN CALCIUM 20 MG/1
20 TABLET, FILM COATED ORAL NIGHTLY
Qty: 90 TABLET | Refills: 3 | Status: SHIPPED | OUTPATIENT
Start: 2018-12-19 | End: 2020-03-30

## 2018-12-19 RX ORDER — LOSARTAN POTASSIUM 50 MG/1
50 TABLET ORAL DAILY
Qty: 90 TABLET | Refills: 3 | Status: SHIPPED | OUTPATIENT
Start: 2018-12-19 | End: 2020-02-13

## 2018-12-19 RX ORDER — TRIAMTERENE AND HYDROCHLOROTHIAZIDE 37.5; 25 MG/1; MG/1
1 CAPSULE ORAL DAILY
Qty: 90 CAPSULE | Refills: 3 | Status: SHIPPED | OUTPATIENT
Start: 2018-12-19 | End: 2019-12-26

## 2019-01-06 ENCOUNTER — TELEPHONE (OUTPATIENT)
Dept: INTERNAL MEDICINE | Facility: CLINIC | Age: 59
End: 2019-01-06

## 2019-01-06 DIAGNOSIS — R79.89 ELEVATED LFTS: Primary | ICD-10-CM

## 2019-01-07 ENCOUNTER — TELEPHONE (OUTPATIENT)
Dept: INTERNAL MEDICINE | Facility: CLINIC | Age: 59
End: 2019-01-07

## 2019-01-07 NOTE — TELEPHONE ENCOUNTER
Noted.  Spoke to pt. Pt advised of results and recommendations.  Pt will call back once she is ready to schedule.

## 2019-01-07 NOTE — TELEPHONE ENCOUNTER
----- Message from Cristal Enriquez MD sent at 1/6/2019  4:15 PM CST -----  Ivelisse,  Your ultrasound did not reveal the cause of your elevated liver tests.  A consult with the Hepatologist would be appropriate to further evaluate the findings.  A consult will be placed and our referral co ordinator, Maribeth, will call to set up an appointment.  Please do not hesitate to call/email if you have any questions or concerns  Cristal Giordano

## 2019-01-10 ENCOUNTER — TELEPHONE (OUTPATIENT)
Dept: INTERNAL MEDICINE | Facility: CLINIC | Age: 59
End: 2019-01-10

## 2019-01-10 ENCOUNTER — HOSPITAL ENCOUNTER (OUTPATIENT)
Dept: RADIOLOGY | Facility: HOSPITAL | Age: 59
Discharge: HOME | End: 2019-01-10
Attending: INTERNAL MEDICINE
Payer: MEDICARE

## 2019-01-10 DIAGNOSIS — Z12.31 ENCOUNTER FOR SCREENING MAMMOGRAM FOR BREAST CANCER: ICD-10-CM

## 2019-01-10 PROCEDURE — 77067 SCR MAMMO BI INCL CAD: CPT | Mod: TC,PO

## 2019-01-10 PROCEDURE — 77063 BREAST TOMOSYNTHESIS BI: CPT | Mod: 26,,, | Performed by: RADIOLOGY

## 2019-01-10 PROCEDURE — 77063 MAMMO DIGITAL SCREENING BILAT WITH TOMOSYNTHESIS_CAD: ICD-10-PCS | Mod: 26,,, | Performed by: RADIOLOGY

## 2019-01-10 PROCEDURE — 77067 SCR MAMMO BI INCL CAD: CPT | Mod: 26,,, | Performed by: RADIOLOGY

## 2019-01-10 PROCEDURE — 77067 MAMMO DIGITAL SCREENING BILAT WITH TOMOSYNTHESIS_CAD: ICD-10-PCS | Mod: 26,,, | Performed by: RADIOLOGY

## 2019-01-10 NOTE — TELEPHONE ENCOUNTER
----- Message from Cristal Enriquez MD sent at 1/10/2019  4:17 PM CST -----  Please note that your mammogram was read as follows  Impression:  There is no mammographic evidence of malignancy.  Cristal Tejeda

## 2019-01-14 ENCOUNTER — DOCUMENTATION ONLY (OUTPATIENT)
Dept: TRANSPLANT | Facility: CLINIC | Age: 59
End: 2019-01-14

## 2019-01-14 NOTE — LETTER
January 14, 2019    Ivelisse Hay  [address]  [place] [address]      Dear Ivelisse Hay:    Your doctor has referred you to the [facility]. We are sending this letter to remind you to make an appointment with us to complete the referral process.     Please call us at [phone] to schedule an appointment. We look forward to seeing you soon.     If you received a call and have been scheduled, please disregard this letter.       Sincerely,        [facility]   [address] [place] [id number]  [phone]

## 2019-01-14 NOTE — LETTER
January 14, 2019    Cristal Enriquez MD  [address] [facility] [address]      Dear Dr. Enriquez    Patient: Ivelisse Hay   MR Number: [id number]   YOB: 1960     Thank you for the referral of Ivelisse Hay to the [facility] program. An initial appointment will be scheduled for your patient with one of our Hepatologists.      Thank you again for your trust in our program.  If there is anything we can do for you or your staff, please feel free to contact us.        Sincerely,        [facility]  [address] [place] [address]  [phone]

## 2019-01-14 NOTE — NURSING
Pt records reviewed.   Pt will be referred to Hepatology.  Elevated LFTs  Initial referral received  from the workque.   Referring Provider/diagnosis  Cristal Enriquez MD  Referral letter sent to patient.

## 2019-02-01 ENCOUNTER — PATIENT MESSAGE (OUTPATIENT)
Dept: INTERNAL MEDICINE | Facility: CLINIC | Age: 59
End: 2019-02-01

## 2019-02-05 ENCOUNTER — TELEPHONE (OUTPATIENT)
Dept: HEPATOLOGY | Facility: CLINIC | Age: 59
End: 2019-02-05

## 2019-02-05 NOTE — TELEPHONE ENCOUNTER
Patient called back schedule her Initial visit on 3/22/19 with Dr. Alston. Mailed appt reminder to patient.

## 2019-02-05 NOTE — TELEPHONE ENCOUNTER
MA called patient to schedule her appt to see liver specialist she is unable to reached left her VM to please give us a callback.

## 2019-02-05 NOTE — TELEPHONE ENCOUNTER
----- Message from Summer Pedraza sent at 2/4/2019  5:12 PM CST -----  Contact: patient       ----- Message -----  From: Len Murillo  Sent: 2/4/2019   4:11 PM  To: [facility]    Please call pt at [phone]    Patient received a letter about calling to schedule a future appt    Thank you

## 2019-03-22 ENCOUNTER — PROCEDURE VISIT (OUTPATIENT)
Dept: HEPATOLOGY | Facility: CLINIC | Age: 59
End: 2019-03-22
Attending: INTERNAL MEDICINE
Payer: MEDICARE

## 2019-03-22 ENCOUNTER — OFFICE VISIT (OUTPATIENT)
Dept: HEPATOLOGY | Facility: CLINIC | Age: 59
End: 2019-03-22
Payer: MEDICARE

## 2019-03-22 VITALS
SYSTOLIC BLOOD PRESSURE: 154 MMHG | HEIGHT: 64 IN | RESPIRATION RATE: 18 BRPM | WEIGHT: 172.81 LBS | HEART RATE: 85 BPM | OXYGEN SATURATION: 99 % | DIASTOLIC BLOOD PRESSURE: 75 MMHG | BODY MASS INDEX: 29.5 KG/M2

## 2019-03-22 DIAGNOSIS — R79.89 ABNORMAL LIVER FUNCTION TESTS: ICD-10-CM

## 2019-03-22 DIAGNOSIS — R79.89 ABNORMAL LIVER FUNCTION TESTS: Primary | ICD-10-CM

## 2019-03-22 DIAGNOSIS — K76.0 NAFLD (NONALCOHOLIC FATTY LIVER DISEASE): ICD-10-CM

## 2019-03-22 DIAGNOSIS — K76.9 LIVER DISEASE: ICD-10-CM

## 2019-03-22 PROCEDURE — 91200 PR LIVER ELASTOGRAPHY W/OUT IMAG W/INTERP & REPORT: ICD-10-PCS | Mod: S$GLB,,, | Performed by: INTERNAL MEDICINE

## 2019-03-22 PROCEDURE — 3078F PR MOST RECENT DIASTOLIC BLOOD PRESSURE < 80 MM HG: ICD-10-PCS | Mod: CPTII,S$GLB,, | Performed by: INTERNAL MEDICINE

## 2019-03-22 PROCEDURE — 99204 PR OFFICE/OUTPT VISIT, NEW, LEVL IV, 45-59 MIN: ICD-10-PCS | Mod: S$GLB,,, | Performed by: INTERNAL MEDICINE

## 2019-03-22 PROCEDURE — 99204 OFFICE O/P NEW MOD 45 MIN: CPT | Mod: S$GLB,,, | Performed by: INTERNAL MEDICINE

## 2019-03-22 PROCEDURE — 3078F DIAST BP <80 MM HG: CPT | Mod: CPTII,S$GLB,, | Performed by: INTERNAL MEDICINE

## 2019-03-22 PROCEDURE — 3077F PR MOST RECENT SYSTOLIC BLOOD PRESSURE >= 140 MM HG: ICD-10-PCS | Mod: CPTII,S$GLB,, | Performed by: INTERNAL MEDICINE

## 2019-03-22 PROCEDURE — 3008F BODY MASS INDEX DOCD: CPT | Mod: CPTII,S$GLB,, | Performed by: INTERNAL MEDICINE

## 2019-03-22 PROCEDURE — 91200 LIVER ELASTOGRAPHY: CPT | Mod: S$GLB,,, | Performed by: INTERNAL MEDICINE

## 2019-03-22 PROCEDURE — 99999 PR PBB SHADOW E&M-EST. PATIENT-LVL IV: ICD-10-PCS | Mod: PBBFAC,,, | Performed by: INTERNAL MEDICINE

## 2019-03-22 PROCEDURE — 99999 PR PBB SHADOW E&M-EST. PATIENT-LVL IV: CPT | Mod: PBBFAC,,, | Performed by: INTERNAL MEDICINE

## 2019-03-22 PROCEDURE — 3008F PR BODY MASS INDEX (BMI) DOCUMENTED: ICD-10-PCS | Mod: CPTII,S$GLB,, | Performed by: INTERNAL MEDICINE

## 2019-03-22 PROCEDURE — 3077F SYST BP >= 140 MM HG: CPT | Mod: CPTII,S$GLB,, | Performed by: INTERNAL MEDICINE

## 2019-03-22 NOTE — PROGRESS NOTES
Subjective:       Patient ID: Ivelisse Hay is a 58 y.o. female.    Chief Complaint: Elevated Hepatic Enzymes    HPI  I saw this 58 y.o. lady with mildly elevated LFTs.  She is asymptomatic.  AST/ALT 24/55    Abdo US: 1/3/2019  No acute findings.    Weight up - 20 lb in last few months    Body mass index is 29.67 kg/m².    PMH:  Hypertension  Kidney stones- 2017  Hypercholesterolemia    No DM    SH:  Retired jewelry  Non-smoker  Alcohol - 3-4 glasses of wine per week  2 kids- healthy    FH:  Nil known      Review of Systems   Constitutional: Negative for activity change, appetite change, chills, fatigue, fever and unexpected weight change.   HENT: Negative for ear pain, hearing loss, nosebleeds, sore throat and trouble swallowing.    Eyes: Negative for redness and visual disturbance.   Respiratory: Negative for cough, chest tightness, shortness of breath and wheezing.    Cardiovascular: Negative for chest pain and palpitations.   Gastrointestinal: Negative for abdominal distention, abdominal pain, blood in stool, constipation, diarrhea, nausea and vomiting.   Genitourinary: Negative for difficulty urinating, dysuria, frequency, hematuria and urgency.   Musculoskeletal: Negative for arthralgias, back pain, gait problem, joint swelling and myalgias.   Skin: Negative for rash.   Neurological: Negative for tremors, seizures, speech difficulty, weakness and headaches.   Hematological: Negative for adenopathy.   Psychiatric/Behavioral: Negative for confusion, decreased concentration and sleep disturbance. The patient is not nervous/anxious.            Lab Results   Component Value Date    ALT 55 (H) 12/12/2018    AST 24 12/12/2018    ALKPHOS 121 12/12/2018    BILITOT 0.8 12/12/2018     Past Medical History:   Diagnosis Date    Diarrhea     Gastritis     GERD (gastroesophageal reflux disease)     HLD (hyperlipidemia)     Hypertension      Past Surgical History:   Procedure Laterality Date    CYSTOSCOPY       CYSTOSCOPY N/A 4/21/2014    Performed by [facility]k Gates Jr[facility] at [facility] OR 1ST FLR    CYSTOSCOPY Left 4/9/2014    [facility] by [facility]ultz [facility] at [facility] OR    CYSTOSCOPY, WITH URETERAL STENT INSERTION Left 4/3/2014    [facility] at [facility] OR    DEBRIDEMENT-SHOULDER-ARTHROSCOPIC Left 12/10/2014    Per[facility]ccrary [facility] at [facility] OR    ESOPHAGOGASTRODUODENOSCOPY (EGD) N/A 5/5/2017    Performed by Dasia Jo MD at [facility] ENDO    EXTRACTION - STONE Left 4/21/2014    Performed by [facility]k Gates Jr[facility] at [facility] OR 1ST FLR    INSERTION, STENT, URETER Left 4/21/2014    Performed by [facility] Gates Jr[facility] at [facility] OR 1ST FLR    LASER Left 4/21/2014    Performed by [facility]k Gates Jr[facility] at [facility] OR 1ST FLR    NEPHROSTOMY TUBE      CYSTOSCOPY WITH STENT    REDUCTION-MAMMOPLASTY-BILATERAL (CONSENT AM OF) Bilateral 3/15/2018    Performed by Raad Rodriguez MD at [facility] OR 2ND FLR    REMOVAL Left 4/21/2014    Performed by [facility]k Gates Jr[facility] at [facility] OR 1ST FLR    REPAIR ROTATOR CUFF ARTHROSCOPIC VS DEBRIDEMENT Left 12/10/2014    Performed by [facility]rary [facility] at [facility] OR    REPAIR, ROTATOR CUFF, ARTHROSCOPIC Right 9/17/2013    Performed by Yony Head [facility] at [facility] OR 1ST FLR    REPAIR-TENDON-BICEP-OPEN SUB PEC TENODESIS Left 12/10/2014    Performed by [facility]rary [facility] at [facility] OR    RESECTION-CLAVICLE / DCE Left 12/10/2014    Performed by [facility]rary [facility] at [facility] OR    SHOULDER SURGERY  9-17-13    right    TOTAL REDUCTION MAMMOPLASTY Bilateral 2018    URETERAL STENT PLACEMENT      THEN REMOVED    URETEROSCOPY Left 4/21/2014    Performed by Ab[facility]hek Gates Jr., MD at [facility] OR 1ST FLR     Current Outpatient Medications   Medication Sig    ADACEL,TDAP ADOLESN/ADULT,,PF, 2 Lf-(2.5-5-3-5 mcg)-5Lf/0.5 mL Syrg     aspirin (ECOTRIN) 81 MG EC tablet Take 81 mg by mouth once daily.    atorvastatin (LIPITOR) 20 MG tablet Take 1 tablet (20 mg  total) by mouth nightly.    fish oil-omega-3 fatty acids 300-1,000 mg capsule Take 2 g by mouth once daily.    FLUARIX QUAD [id number], PF, 60 mcg (15 mcg x 4)/0.5 mL Syrg vaccine     losartan (COZAAR) 50 MG tablet Take 1 tablet (50 mg total) by mouth once daily.    magnesium 30 mg Tab Take by mouth.    multivitamin capsule Take 1 capsule by mouth once daily.    SHINGRIX, PF, 50 mcg/0.5 mL injection     triamterene-hydrochlorothiazide 37.5-25 mg (DYAZIDE) 37.5-25 mg per capsule Take 1 capsule by mouth once daily.    Lactobacillus rhamnosus GG (CULTURELLE) 10 billion cell capsule Take 1 capsule by mouth once daily.     Current Facility-Administered Medications   Medication    lidocaine-EPINEPHrine 1%-1:100,000 30 mL, lidocaine HCL 10 mg/ml (1%) 20 mL, sodium bicarbonate 1 mEq/mL (8.4 %) 10 mL in sodium chloride 0.9% 500 mL solution       Objective:      Physical Exam   Constitutional: She appears well-nourished. No distress.   HENT:   Head: Normocephalic.   Eyes: Pupils are equal, round, and reactive to light.   Neck: No JVD present. No tracheal deviation present. No thyromegaly present.   Cardiovascular: Normal rate, regular rhythm and normal heart sounds.   No murmur heard.  Pulmonary/Chest: Effort normal and breath sounds normal. No stridor.   Abdominal: Soft.   Lymphadenopathy:        Head (right side): No submental, no submandibular, no tonsillar, no preauricular, no posterior auricular and no occipital adenopathy present.        Head (left side): No submental, no submandibular, no tonsillar, no preauricular, no posterior auricular and no occipital adenopathy present.     She has no cervical adenopathy.     She has no axillary adenopathy.   Neurological: She is alert. She has normal strength. She is not disoriented. No cranial nerve deficit or sensory deficit.   Skin: Skin is intact. No cyanosis.       Assessment:       1. Abnormal liver function tests    2. Liver disease     3. NAFLD (nonalcoholic fatty  liver disease)        Plan:   Her fibroscan suggests NAFLD (fatty liver) with no fibrosis which is good news.    - recommended losing weight - 15lb  - high protein diet/low carbs  - labs to exclude rare causes of liver disease    Clinic in 6 months.

## 2019-03-22 NOTE — PROCEDURES
Fibroscan Procedure     Name: Ivelisse Hay  Date of Procedure : 2019   :: Pascual Alston MD  Diagnosis: NAFLD    Probe: XL    Fibroscan readin.4 KPa    Fibrosis:F 0-1     CAP readin dB/m    Steatosis: :S3

## 2019-03-22 NOTE — LETTER
March 22, 2019      Cristal Enriquez MD  [address] [facility] [place] [address]           [facility] - Hepatology  [address]  [place] [address]  Phone: [phone]  Fax: [phone]          Patient: Ivelisse Hay   MR Number: [id number]   YOB: 1960   Date of Visit: 3/22/2019       Dear Dr. Cristal Enriquez:    Thank you for referring Ivelisse Hay to me for evaluation. Attached you will find relevant portions of my assessment and plan of care.    If you have questions, please do not hesitate to call me. I look forward to following Ivelisse Hay along with you.    Sincerely,    Pascual Alston MD    Enclosure  CC:  No Recipients    If you would like to receive this communication electronically, please contact [email or web address] or [phone] to request more information on [employer] access.    For providers and/or their staff who would like to refer a patient to [facility], please contact us through our one-stop-shop provider referral line, [facility], at [phone].    If you feel you have received this communication in error or would no longer like to receive these types of communications, please e-mail [email or web address]

## 2019-08-01 ENCOUNTER — TELEPHONE (OUTPATIENT)
Dept: INTERNAL MEDICINE | Facility: CLINIC | Age: 59
End: 2019-08-01

## 2019-08-01 DIAGNOSIS — R73.01 IMPAIRED FASTING GLUCOSE: ICD-10-CM

## 2019-08-01 DIAGNOSIS — E78.5 HYPERLIPIDEMIA, UNSPECIFIED HYPERLIPIDEMIA TYPE: ICD-10-CM

## 2019-08-01 DIAGNOSIS — E55.9 VITAMIN D DEFICIENCY: ICD-10-CM

## 2019-08-01 DIAGNOSIS — I10 ESSENTIAL HYPERTENSION: Primary | ICD-10-CM

## 2019-08-01 NOTE — TELEPHONE ENCOUNTER
Spoke to pt. Pt stated that she is leaving the country at the end of October, but returning at the beginning on November.  Pt advised that she is not due for her physical until December.  Pt stated that her insurance allows her to have a physical before the year tomeka. She wanted to try and have it done before her trip.  Pt denies any problems or concerns. Pt advised that nothing was available for a physical with Dr. Tejeda before December.  Pt ok with waiting until December.

## 2019-08-01 NOTE — TELEPHONE ENCOUNTER
----- Message from Alejandra Haro sent at 8/1/2019 12:51 PM CDT -----  Contact: self   Doctor appointment and lab have been scheduled.  Please link lab orders to the lab appointment.  Date of doctor appointment:  12/3  Date of lab appointment:  11/20  Physical or EP: Physical  Comments:

## 2019-08-01 NOTE — TELEPHONE ENCOUNTER
----- Message from Luz Winchester sent at 8/1/2019 12:40 PM CDT -----  Contact: Patient [phone]  Patient is leaving the country at the end of October and wants to  her appt up.    Request that you call asap.    Thank You

## 2019-08-09 ENCOUNTER — PATIENT MESSAGE (OUTPATIENT)
Dept: INTERNAL MEDICINE | Facility: CLINIC | Age: 59
End: 2019-08-09

## 2019-08-09 DIAGNOSIS — M25.569 KNEE PAIN, UNSPECIFIED CHRONICITY, UNSPECIFIED LATERALITY: Primary | ICD-10-CM

## 2019-08-13 ENCOUNTER — OFFICE VISIT (OUTPATIENT)
Dept: SPORTS MEDICINE | Facility: CLINIC | Age: 59
End: 2019-08-13
Payer: MEDICARE

## 2019-08-13 ENCOUNTER — HOSPITAL ENCOUNTER (OUTPATIENT)
Dept: RADIOLOGY | Facility: HOSPITAL | Age: 59
Discharge: HOME | End: 2019-08-13
Attending: ORTHOPAEDIC SURGERY
Payer: MEDICARE

## 2019-08-13 VITALS — BODY MASS INDEX: 29.37 KG/M2 | HEIGHT: 64 IN | WEIGHT: 172 LBS

## 2019-08-13 DIAGNOSIS — M25.561 RIGHT KNEE PAIN, UNSPECIFIED CHRONICITY: Primary | ICD-10-CM

## 2019-08-13 DIAGNOSIS — M17.11 PRIMARY OSTEOARTHRITIS OF RIGHT KNEE: ICD-10-CM

## 2019-08-13 DIAGNOSIS — M25.561 RIGHT KNEE PAIN, UNSPECIFIED CHRONICITY: ICD-10-CM

## 2019-08-13 PROCEDURE — 3008F BODY MASS INDEX DOCD: CPT | Mod: CPTII,S$GLB,, | Performed by: ORTHOPAEDIC SURGERY

## 2019-08-13 PROCEDURE — 99203 PR OFFICE/OUTPT VISIT, NEW, LEVL III, 30-44 MIN: ICD-10-PCS | Mod: 25,S$GLB,, | Performed by: ORTHOPAEDIC SURGERY

## 2019-08-13 PROCEDURE — 20610 LARGE JOINT ASPIRATION/INJECTION: R KNEE: ICD-10-PCS | Mod: RT,S$GLB,, | Performed by: ORTHOPAEDIC SURGERY

## 2019-08-13 PROCEDURE — 73564 XR KNEE ORTHO BILAT WITH FLEXION: ICD-10-PCS | Mod: 26,50,, | Performed by: RADIOLOGY

## 2019-08-13 PROCEDURE — 99999 PR PBB SHADOW E&M-EST. PATIENT-LVL III: CPT | Mod: PBBFAC,,, | Performed by: ORTHOPAEDIC SURGERY

## 2019-08-13 PROCEDURE — 3008F PR BODY MASS INDEX (BMI) DOCUMENTED: ICD-10-PCS | Mod: CPTII,S$GLB,, | Performed by: ORTHOPAEDIC SURGERY

## 2019-08-13 PROCEDURE — 99999 PR PBB SHADOW E&M-EST. PATIENT-LVL III: ICD-10-PCS | Mod: PBBFAC,,, | Performed by: ORTHOPAEDIC SURGERY

## 2019-08-13 PROCEDURE — 73564 X-RAY EXAM KNEE 4 OR MORE: CPT | Mod: 26,50,, | Performed by: RADIOLOGY

## 2019-08-13 PROCEDURE — 73564 X-RAY EXAM KNEE 4 OR MORE: CPT | Mod: TC,50,FY,PO

## 2019-08-13 PROCEDURE — 99203 OFFICE O/P NEW LOW 30 MIN: CPT | Mod: 25,S$GLB,, | Performed by: ORTHOPAEDIC SURGERY

## 2019-08-13 PROCEDURE — 20610 DRAIN/INJ JOINT/BURSA W/O US: CPT | Mod: RT,S$GLB,, | Performed by: ORTHOPAEDIC SURGERY

## 2019-08-13 RX ORDER — TRIAMCINOLONE ACETONIDE 40 MG/ML
40 INJECTION, SUSPENSION INTRA-ARTICULAR; INTRAMUSCULAR
Status: DISCONTINUED | OUTPATIENT
Start: 2019-08-13 | End: 2019-08-13 | Stop reason: HOSPADM

## 2019-08-13 RX ADMIN — TRIAMCINOLONE ACETONIDE 40 MG: 40 INJECTION, SUSPENSION INTRA-ARTICULAR; INTRAMUSCULAR at 04:08

## 2019-08-13 NOTE — PROGRESS NOTES
CC: Right knee pain    59 y.o. Female with a history of right knee pain for several weeks, no injuries. She reports daily global knee pain. Pain is worse with increased activity. Denies feelings of instability or mechanical symptoms.   She states that the pain is severe and not responding to any conservative care.  She has tried NSAIDs and compression sleeves with no improvement.       no mechanical symptoms, no instability    Is affecting ADLs.  Pain is 8/10 at it's worst.    REVIEW OF SYSTEMS:   Constitution: Negative. Negative for chills, fever and night sweats.   HENT: Negative for congestion and headaches.    Eyes: Negative for blurred vision, left vision loss and right vision loss.   Cardiovascular: Negative for chest pain and syncope.   Respiratory: Negative for cough and shortness of breath.    Endocrine: Negative for polydipsia, polyphagia and polyuria.   Hematologic/Lymphatic: Negative for bleeding problem. Does not bruise/bleed easily.   Skin: Negative for dry skin, itching and rash.   Musculoskeletal: Negative for falls. Positive for right knee pain and  muscle weakness.   Gastrointestinal: Negative for abdominal pain and bowel incontinence.   Genitourinary: Negative for bladder incontinence and nocturia.   Neurological: Negative for disturbances in coordination, loss of balance and seizures.   Psychiatric/Behavioral: Negative for depression. The patient does not have insomnia.    Allergic/Immunologic: Negative for hives and persistent infections.     PAST MEDICAL HISTORY:   Past Medical History:   Diagnosis Date    Diarrhea     Gastritis     GERD (gastroesophageal reflux disease)     HLD (hyperlipidemia)     Hypertension        PAST SURGICAL HISTORY:   Past Surgical History:   Procedure Laterality Date    CYSTOSCOPY      CYSTOSCOPY N/A 4/21/2014    Performed by Abhishek Gates Jr., MD at [facility] OR [address] [place] [address]    CYSTOSCOPY Left 4/9/2014    Performed by Buddy Shultz MD at [facility] OR    CYSTOSCOPY,  WITH URETERAL STENT INSERTION Left 4/3/2014    Performed by Buddy Shultz MD at [facility] OR    DEBRIDEMENT-SHOULDER-ARTHROSCOPIC Left 12/10/2014    Per[facility]ccrary [facility]    ESOPHAGOGASTRODUODENOSCOPY (EGD) N/A 5/5/2017    Performed by Dasia Jo MD at [facility] ENDO    EXTRACTION - STONE Left 4/21/2014    Performed by Abhishek Gates Jr., MD at [facility] OR 1ST FLR    INSERTION, STENT, URETER Left 4/21/2014    Performed by Ab[facility]hek Gates Jr., MD at [facility] OR 1ST FLR    LASER Left 4/21/2014    Performed by Ab[facility]hek Gates Jr., MD at [facility] OR 1ST FLR    NEPHROSTOMY TUBE      CYSTOSCOPY WITH STENT    REDUCTION-MAMMOPLASTY-BILATERAL (CONSENT AM OF) Bilateral 3/15/2018    Performed by Raad Rodriguez MD at [facility] OR 2ND FLR    REMOVAL Left 4/21/2014    Performed by Abhishek Gates Jr., MD at [facility] OR 1ST FLR    REPAIR ROTATOR CUFF ARTHROSCOPIC VS DEBRIDEMENT Left 12/10/2014    Performed by [facility]ony [facility]ccrary [facility] at [facility]    REPAIR, ROTATOR CUFF, ARTHROSCOPIC Right 9/17/2013    Performed by Yony Head MD at [facility] OR 1ST FLR    REPAIR-TENDON-BICEP-OPEN SUB PEC TENODESIS Left 12/10/2014    Performed by [facility]rary [facility] at [facility]    RESECTION-CLAVICLE / DCE Left 12/10/2014    Performed by [facility]rary [facility] at [facility]    SHOULDER SURGERY  9-17-13    right    TOTAL REDUCTION MAMMOPLASTY Bilateral 2018    URETERAL STENT PLACEMENT      THEN REMOVED    URETEROSCOPY Left 4/21/2014    Performed by Abhishek Gates Jr., MD at [facility] OR 1ST FLR       FAMILY HISTORY:   Family History   Problem Relation Age of Onset    No Known Problems Mother     Hypertension Father     Lung cancer Father         d. 72    No Known Problems Sister     Hypertension Brother        SOCIAL HISTORY:   Social History     Socioeconomic History    Marital status:      Spouse name: Not on file    Number of children: Not on file    Years of education: Not on file    Highest  education level: Not on file   Occupational History    Not on file   Social Needs    Financial resource strain: Not on file    Food insecurity:     Worry: Not on file     Inability: Not on file    Transportation needs:     Medical: Not on file     Non-medical: Not on file   Tobacco Use    Smoking status: Never Smoker    Smokeless tobacco: Never Used   Substance and Sexual Activity    Alcohol use: No    Drug use: No    Sexual activity: Yes     Partners: Male   Lifestyle    Physical activity:     Days per week: Not on file     Minutes per session: Not on file    Stress: Not on file   Relationships    Social connections:     Talks on phone: Not on file     Gets together: Not on file     Attends Temple service: Not on file     Active member of club or organization: Not on file     Attends meetings of clubs or organizations: Not on file     Relationship status: Not on file   Other Topics Concern    Not on file   Social History Narrative        2 children, 1 dtr and 1 son    dtr in Med school    ,     Son to be  12/2015    +/- exercise       MEDICATIONS:     Current Outpatient Medications:     ADACEL,TDAP ADOLESN/ADULT,,PF, 2 Lf-(2.5-5-3-5 mcg)-5Lf/0.5 mL Syrg, , Disp: , Rfl:     aspirin (ECOTRIN) 81 MG EC tablet, Take 81 mg by mouth once daily., Disp: , Rfl:     atorvastatin (LIPITOR) 20 MG tablet, Take 1 tablet (20 mg total) by mouth nightly., Disp: 90 tablet, Rfl: 3    fish oil-omega-3 fatty acids 300-1,000 mg capsule, Take 2 g by mouth once daily., Disp: , Rfl:     FLUARIX QUAD 0160-0627, PF, 60 mcg (15 mcg x 4)/0.5 mL Syrg vaccine, , Disp: , Rfl:     Lactobacillus rhamnosus GG (CULTURELLE) 10 billion cell capsule, Take 1 capsule by mouth once daily., Disp: , Rfl:     losartan (COZAAR) 50 MG tablet, Take 1 tablet (50 mg total) by mouth once daily., Disp: 90 tablet, Rfl: 3    magnesium 30 mg Tab, Take by mouth., Disp: , Rfl:     multivitamin capsule, Take 1 capsule by mouth  "once daily., Disp: , Rfl:     SHINGRIX, PF, 50 mcg/0.5 mL injection, , Disp: , Rfl:     triamterene-hydrochlorothiazide 37.5-25 mg (DYAZIDE) 37.5-25 mg per capsule, Take 1 capsule by mouth once daily., Disp: 90 capsule, Rfl: 3    Current Facility-Administered Medications:     lidocaine-EPINEPHrine 1%-1:100,000 30 mL, lidocaine HCL 10 mg/ml (1%) 20 mL, sodium bicarbonate 1 mEq/mL (8.4 %) 10 mL in sodium chloride 0.9% 500 mL solution, , MISCELLANEOUS, 1 time in Clinic/HOD, Raad Rodriguez MD    ALLERGIES:   Review of patient's allergies indicates:  No Known Allergies    VITAL SIGNS:   Ht 5' 4" (1.626 m)   Wt 78 kg (172 lb)   BMI 29.52 kg/m²      PHYSICAL EXAMINATION:  General:  The patient is alert and oriented x 3.  Mood is pleasant.  Observation of ears, eyes and nose reveal no gross abnormalities.  No labored breathing observed.    RIGHT KNEE EXAMINATION     OBSERVATION / INSPECTION   Gait:   Nonantalgic   Alignment:  Neutral   Scars:   None   Muscle atrophy: Mild  Effusion:  None   Warmth:  None   Discoloration:   none     TENDERNESS / CREPITUS (T / C):          T / C      T / C   Patella   - / -   Lateral joint line   - / -   Peripatellar medial  -  Medial joint line    - / -   Peripatellar lateral -  Medial plica   - / -   Patellar tendon -   Popliteal fossa  - / -   Quad tendon   -   Gastrocnemius   -   Prepatellar Bursa - / -   Quadricep   -   Tibial tubercle  -  Thigh/hamstring  -   Pes anserine/HS -  Fibula    -   ITB   - / -  Tibia     -   Tib/fib joint  - / -  LCL    -     MFC   - / -   MCL: Proximal  -    LFC   - / -    Distal   -          ROM: (* = pain)  PASSIVE   ACTIVE    Left :    0 - 130    0-120     Right :    0- 115    0-90    Patellofemoral examination:  See above noted areas of tenderness.   Patella position    Subluxation / dislocation: Centered           Sup. / Inf;   Normal   Crepitus (PF):    Absent   Patellar " Mobility:       Medial-lateral:   Normal    Superior-inferior:  Normal    Inferior tilt   Normal    Patellar tendon:  Normal   Lateral tilt:    Normal   J-sign:     None   Patellofemoral grind:   + pain        MENISCAL SIGNS:     Pain on terminal extension:  -  Pain on terminal flexion:  -  Еленаs maneuver:  + for pain   Squat     + for pain    LIGAMENT EXAMINATION:  ACL / Lachman:  normal (-1 to 2mm)    PCL-Post.  drawer: normal 0 to 2mm  MCL- Valgus:  normal 0 to 2mm  LCL- Varus:  normal 0 to 2mm  Pivot shift: normal (Equal)   Dial Test: difference c/w other side   At 30° flexion: normal (< 5°)    At 90° flexion: normal (< 5°)   Reverse Pivot Shift:   normal (Equal)     STRENGTH: (* = with pain) PAINFUL SIDE   Quadricep   5/5   Hamstrin/5    EXTREMITY NEURO-VASCULAR EXAMINATION:   Sensation:  Grossly intact to light touch all dermatomal regions.   Motor Function:  Fully intact motor function at hip, knee, foot and ankle    DTRs;  quadriceps and  achilles 2+.  No clonus and downgoing Babinski.    Vascular status:  DP and PT pulses 2+, brisk capillary refill, symmetric.         IMAGING:    X-rays including standing, weight bearing AP and flexion bilateral knees, lateral and merchant views ordered and images reviewed by me show:    No fracture, dislocation or other pathology   Medial compartment: mod degenerative changes   Lateral compartment: mild degenerative changes   Patellofemoral compartment: mod degenerative changes        ASSESSMENT:    Right Knee Pain, Probable osteoarthritis    PLAN:  1. CSI R knee today  2. Encouraged low impact activity   3. NSAIDs, rest, ice PRN  4. RTC 6 months        All questions were answered, pt will contact us for questions or concerns in the interim.

## 2019-08-13 NOTE — PROCEDURES
Large Joint Aspiration/Injection: R knee  Date/Time: 8/13/2019 4:00 PM  Performed by: Yony Mccrary MD  Authorized by: Yony Mccrary MD     Consent Done?:  Yes (Verbal)  Indications:  Pain  Procedure site marked: Yes    Timeout: Prior to procedure the correct patient, procedure, and site was verified      Location:  Knee  Site:  R knee  Prep: Patient was prepped and draped in usual sterile fashion    Needle size:  22 G  Ultrasonic Guidance for needle placement: No  Approach:  Superior  Medications:  40 mg triamcinolone acetonide 40 mg/mL  Patient tolerance:  Patient tolerated the procedure well with no immediate complications

## 2019-12-26 RX ORDER — TRIAMTERENE AND HYDROCHLOROTHIAZIDE 37.5; 25 MG/1; MG/1
CAPSULE ORAL
Qty: 90 CAPSULE | Refills: 3 | Status: SHIPPED | OUTPATIENT
Start: 2019-12-26 | End: 2020-12-28

## 2020-02-10 ENCOUNTER — PATIENT OUTREACH (OUTPATIENT)
Dept: ADMINISTRATIVE | Facility: HOSPITAL | Age: 60
End: 2020-02-10

## 2020-02-13 RX ORDER — LOSARTAN POTASSIUM 50 MG/1
TABLET ORAL
Qty: 90 TABLET | Refills: 3 | Status: SHIPPED | OUTPATIENT
Start: 2020-02-13 | End: 2020-02-24 | Stop reason: SDUPTHER

## 2020-02-17 ENCOUNTER — LAB VISIT (OUTPATIENT)
Dept: LAB | Facility: HOSPITAL | Age: 60
End: 2020-02-17
Attending: INTERNAL MEDICINE
Payer: MEDICARE

## 2020-02-17 DIAGNOSIS — R73.01 IMPAIRED FASTING GLUCOSE: ICD-10-CM

## 2020-02-17 DIAGNOSIS — I10 ESSENTIAL HYPERTENSION: ICD-10-CM

## 2020-02-17 DIAGNOSIS — E78.5 HYPERLIPIDEMIA, UNSPECIFIED HYPERLIPIDEMIA TYPE: ICD-10-CM

## 2020-02-17 DIAGNOSIS — E55.9 VITAMIN D DEFICIENCY: ICD-10-CM

## 2020-02-17 LAB
25(OH)D3+25(OH)D2 SERPL-MCNC: 36 NG/ML (ref 30–96)
ALBUMIN SERPL BCP-MCNC: 4.2 G/DL (ref 3.5–5.2)
ALP SERPL-CCNC: 131 U/L (ref 55–135)
ALT SERPL W/O P-5'-P-CCNC: 39 U/L (ref 10–44)
ANION GAP SERPL CALC-SCNC: 8 MMOL/L (ref 8–16)
AST SERPL-CCNC: 22 U/L (ref 10–40)
BASOPHILS # BLD AUTO: 0.04 K/UL (ref 0–0.2)
BASOPHILS NFR BLD: 0.4 % (ref 0–1.9)
BILIRUB SERPL-MCNC: 0.3 MG/DL (ref 0.1–1)
BUN SERPL-MCNC: 14 MG/DL (ref 6–20)
CALCIUM SERPL-MCNC: 9.8 MG/DL (ref 8.7–10.5)
CHLORIDE SERPL-SCNC: 104 MMOL/L (ref 95–110)
CHOLEST SERPL-MCNC: 212 MG/DL (ref 120–199)
CHOLEST/HDLC SERPL: 3.2 {RATIO} (ref 2–5)
CO2 SERPL-SCNC: 30 MMOL/L (ref 23–29)
CREAT SERPL-MCNC: 0.8 MG/DL (ref 0.5–1.4)
DIFFERENTIAL METHOD: ABNORMAL
EOSINOPHIL # BLD AUTO: 0.2 K/UL (ref 0–0.5)
EOSINOPHIL NFR BLD: 1.5 % (ref 0–8)
ERYTHROCYTE [DISTWIDTH] IN BLOOD BY AUTOMATED COUNT: 14 % (ref 11.5–14.5)
EST. GFR  (AFRICAN AMERICAN): >60 ML/MIN/1.73 M^2
EST. GFR  (NON AFRICAN AMERICAN): >60 ML/MIN/1.73 M^2
GLUCOSE SERPL-MCNC: 105 MG/DL (ref 70–110)
HCT VFR BLD AUTO: 42.6 % (ref 37–48.5)
HDLC SERPL-MCNC: 66 MG/DL (ref 40–75)
HDLC SERPL: 31.1 % (ref 20–50)
HGB BLD-MCNC: 12.7 G/DL (ref 12–16)
IMM GRANULOCYTES # BLD AUTO: 0.04 K/UL (ref 0–0.04)
IMM GRANULOCYTES NFR BLD AUTO: 0.4 % (ref 0–0.5)
LDLC SERPL CALC-MCNC: 96.8 MG/DL (ref 63–159)
LYMPHOCYTES # BLD AUTO: 2.6 K/UL (ref 1–4.8)
LYMPHOCYTES NFR BLD: 26.5 % (ref 18–48)
MCH RBC QN AUTO: 26.3 PG (ref 27–31)
MCHC RBC AUTO-ENTMCNC: 29.8 G/DL (ref 32–36)
MCV RBC AUTO: 88 FL (ref 82–98)
MONOCYTES # BLD AUTO: 0.7 K/UL (ref 0.3–1)
MONOCYTES NFR BLD: 7 % (ref 4–15)
NEUTROPHILS # BLD AUTO: 6.2 K/UL (ref 1.8–7.7)
NEUTROPHILS NFR BLD: 64.2 % (ref 38–73)
NONHDLC SERPL-MCNC: 146 MG/DL
NRBC BLD-RTO: 0 /100 WBC
PLATELET # BLD AUTO: 290 K/UL (ref 150–350)
PMV BLD AUTO: 11.4 FL (ref 9.2–12.9)
POTASSIUM SERPL-SCNC: 4 MMOL/L (ref 3.5–5.1)
PROT SERPL-MCNC: 7.5 G/DL (ref 6–8.4)
RBC # BLD AUTO: 4.82 M/UL (ref 4–5.4)
SODIUM SERPL-SCNC: 142 MMOL/L (ref 136–145)
TRIGL SERPL-MCNC: 246 MG/DL (ref 30–150)
TSH SERPL DL<=0.005 MIU/L-ACNC: 1.48 UIU/ML (ref 0.4–4)
WBC # BLD AUTO: 9.68 K/UL (ref 3.9–12.7)

## 2020-02-17 PROCEDURE — 80053 COMPREHEN METABOLIC PANEL: CPT

## 2020-02-17 PROCEDURE — 82306 VITAMIN D 25 HYDROXY: CPT

## 2020-02-17 PROCEDURE — 83036 HEMOGLOBIN GLYCOSYLATED A1C: CPT

## 2020-02-17 PROCEDURE — 80061 LIPID PANEL: CPT

## 2020-02-17 PROCEDURE — 84443 ASSAY THYROID STIM HORMONE: CPT

## 2020-02-17 PROCEDURE — 85025 COMPLETE CBC W/AUTO DIFF WBC: CPT

## 2020-02-17 PROCEDURE — 36415 COLL VENOUS BLD VENIPUNCTURE: CPT | Mod: PO

## 2020-02-18 ENCOUNTER — TELEPHONE (OUTPATIENT)
Dept: INTERNAL MEDICINE | Facility: CLINIC | Age: 60
End: 2020-02-18

## 2020-02-18 LAB
ESTIMATED AVG GLUCOSE: 120 MG/DL (ref 68–131)
HBA1C MFR BLD HPLC: 5.8 % (ref 4–5.6)

## 2020-02-18 NOTE — TELEPHONE ENCOUNTER
----- Message from Cristal Enriquez MD sent at 2/17/2020  8:02 PM CST -----  Please review your lab work and we will discuss at your pending office visit.  Cristal Tejeda

## 2020-02-21 NOTE — PROGRESS NOTES
60 yo female presents for  Annual PE  Nonsmoker  Social ETOH    HEALTH MAINT:  Chol/lab, reviewed-  C-scope,9/2012  Impression: - normal,  7 years   EGD, 5/2017-  Impression:           - Hiatal hernia.                        - Gastritis. Biopsied.                        - Normal examined duodenum. B  WWE, 4/2012, pap- normal, asx, pt declined update today  Mammo, pending   BMD, pending  EYE exam, pending  DDS exam,UTD    VACCINATIONS:  Tdap, 12/31/2013  Flu, yearly    MedCard reviewed.   REVIEW OF SYSTEMS:   CONSTITUTIONAL: No fever, no chills, no night sweats.   EYES: No double vision or itchy watery eyes.   ENT: No ear tinnitus. No sinus pain or pressure.  ++ olfactory hallucinations, smells odors that no one else does, in particular smoke- denied HA or URI or allergy sx; intermittent, like something burning- @ home or gym  Had CT that was unremarkable, rec ENT and pt had declined, reviewed today and she feels its stable and wants to address back/MSK problems 1st  No focal deficits or neurologic sx  No left ear/jaw discomfort ;  CARDIOVASCULAR: No angina or palpitations.   No claudication w/ exercise-walking  RESPIRATORY: No cough or wheezing.   GI: Occ. indigestion or heartburn.   No blood in her stool or urine.    NEUROLOGICAL: No unusual headaches. No focal deficits.  MS: s/p right and left shoulder surgery, occ problematic;better w/ breast reduction   neck pain, back pain that has exacerbated recently  Hands/wrists hurt w/ decreased ROM, swelling of metacarpals   SKIN: LE bronzing chronic, no improvement off amlodipine, intermittent LLE numbness that has improved  Answers for HPI/ROS submitted by the patient on 2/24/2020   activity change: No  unexpected weight change: No  neck pain: No  hearing loss: No  rhinorrhea: No  trouble swallowing: No  eye discharge: No  visual disturbance: No  chest tightness: No  wheezing: No  chest pain: No  palpitations: No  blood in stool: No  constipation: No  vomiting:  No  diarrhea: No  polydipsia: No  polyuria: No  difficulty urinating: No  hematuria: No  menstrual problem: No  dysuria: No  joint swelling: No  arthralgias: - see above  headaches: No  weakness: No  confusion: No  dysphoric mood: No    Remainder of review negative except as previously noted.         PHYSICAL EXAM:   VS: stable  GENERAL: She is alert and oriented in no acute distress. WDWN, conversant and cooperative. Pleasant pt  EYES: Conjunctivae and lids are okay. Pupils are reactive.   ENT: Hearing intact, canals w/o significant cerumen on right  + cerumen on left ( w/ depression , pt using Q-Tip- rec against), TM's visualized unremarkable  Nasal mucosa/turbinates- unremarkable  Oropharynx - unremarkable  Sinus NT.   NECK: Supple. No thyromegaly or lymphadenopathy noted  RESPIRATORY: Efforts are unlabored.   LUNGS: Clear to auscultation.   HEART: Regular rate and rhythm. No carotid bruits,   1+ pedal pulses, no edema.   ABDOMEN: Bowel sounds present, soft, and nontender. No hepatosplenomegaly.  MS: Gait nl, No CCE  UE: wrists w/ discomfort w/ROM, + tenderness over right metacarpals w/ synovitis of the 2nd and 3rd heads  Left less involved; neg Tinels  NEURO: CARRILLO. No tremor noted  SKIN: Warm and dry; + LE bronzing      IMPRESSION:  Annual PE  FHX: DM  HTN, stable  HLD. Elevated LDL  Aortic atherosclerosis, asx  IFBS/ Pre- DM, stable  LE bronzing  Olfactory hallucinations, persistent, w/up non dx, intermittent- rec ENT, pt declined @ present  Paresthesias, ulna distribution  Joint pain , multisite, synovitis metacarpals  IBS sx of frequent BM, to log food to see if any pattern    PLAN:  Consult Rheum  Mammo  DEXA  Pap- to schedule w/ Mammo and Dexa  Diet  Exercise  Pap-pt declined today  Rx updated  Rx celebrex 200mg   Call prn  RTC 6 mos

## 2020-02-24 ENCOUNTER — OFFICE VISIT (OUTPATIENT)
Dept: INTERNAL MEDICINE | Facility: CLINIC | Age: 60
End: 2020-02-24
Payer: MEDICARE

## 2020-02-24 VITALS
DIASTOLIC BLOOD PRESSURE: 76 MMHG | HEART RATE: 85 BPM | RESPIRATION RATE: 18 BRPM | HEIGHT: 64 IN | SYSTOLIC BLOOD PRESSURE: 130 MMHG | BODY MASS INDEX: 29.35 KG/M2 | WEIGHT: 171.94 LBS | TEMPERATURE: 98 F

## 2020-02-24 DIAGNOSIS — M25.531 PAIN IN BOTH WRISTS: ICD-10-CM

## 2020-02-24 DIAGNOSIS — Z00.00 ANNUAL PHYSICAL EXAM: Primary | ICD-10-CM

## 2020-02-24 DIAGNOSIS — E78.5 HYPERLIPIDEMIA, UNSPECIFIED HYPERLIPIDEMIA TYPE: ICD-10-CM

## 2020-02-24 DIAGNOSIS — M25.532 PAIN IN BOTH WRISTS: ICD-10-CM

## 2020-02-24 DIAGNOSIS — Z12.11 COLON CANCER SCREENING: ICD-10-CM

## 2020-02-24 DIAGNOSIS — I10 ESSENTIAL HYPERTENSION: ICD-10-CM

## 2020-02-24 DIAGNOSIS — R73.01 IMPAIRED FASTING GLUCOSE: ICD-10-CM

## 2020-02-24 DIAGNOSIS — M79.641 PAIN IN BOTH HANDS: ICD-10-CM

## 2020-02-24 DIAGNOSIS — I70.0 AORTIC ATHEROSCLEROSIS: ICD-10-CM

## 2020-02-24 DIAGNOSIS — Z12.31 ENCOUNTER FOR SCREENING MAMMOGRAM FOR BREAST CANCER: ICD-10-CM

## 2020-02-24 DIAGNOSIS — M79.642 PAIN IN BOTH HANDS: ICD-10-CM

## 2020-02-24 DIAGNOSIS — R73.03 PRE-DIABETES: ICD-10-CM

## 2020-02-24 DIAGNOSIS — Z78.0 POSTMENOPAUSAL ESTROGEN DEFICIENCY: ICD-10-CM

## 2020-02-24 PROCEDURE — 99499 RISK ADDL DX/OHS AUDIT: ICD-10-PCS | Mod: S$GLB,,, | Performed by: INTERNAL MEDICINE

## 2020-02-24 PROCEDURE — 3078F DIAST BP <80 MM HG: CPT | Mod: CPTII,S$GLB,, | Performed by: INTERNAL MEDICINE

## 2020-02-24 PROCEDURE — 3078F PR MOST RECENT DIASTOLIC BLOOD PRESSURE < 80 MM HG: ICD-10-PCS | Mod: CPTII,S$GLB,, | Performed by: INTERNAL MEDICINE

## 2020-02-24 PROCEDURE — 99396 PR PREVENTIVE VISIT,EST,40-64: ICD-10-PCS | Mod: S$GLB,,, | Performed by: INTERNAL MEDICINE

## 2020-02-24 PROCEDURE — 99999 PR PBB SHADOW E&M-EST. PATIENT-LVL IV: ICD-10-PCS | Mod: PBBFAC,,, | Performed by: INTERNAL MEDICINE

## 2020-02-24 PROCEDURE — 3075F PR MOST RECENT SYSTOLIC BLOOD PRESS GE 130-139MM HG: ICD-10-PCS | Mod: CPTII,S$GLB,, | Performed by: INTERNAL MEDICINE

## 2020-02-24 PROCEDURE — 99499 UNLISTED E&M SERVICE: CPT | Mod: S$GLB,,, | Performed by: INTERNAL MEDICINE

## 2020-02-24 PROCEDURE — 99999 PR PBB SHADOW E&M-EST. PATIENT-LVL IV: CPT | Mod: PBBFAC,,, | Performed by: INTERNAL MEDICINE

## 2020-02-24 PROCEDURE — 99396 PREV VISIT EST AGE 40-64: CPT | Mod: S$GLB,,, | Performed by: INTERNAL MEDICINE

## 2020-02-24 PROCEDURE — 3075F SYST BP GE 130 - 139MM HG: CPT | Mod: CPTII,S$GLB,, | Performed by: INTERNAL MEDICINE

## 2020-02-24 RX ORDER — LOSARTAN POTASSIUM 50 MG/1
50 TABLET ORAL DAILY
Qty: 90 TABLET | Refills: 3 | Status: SHIPPED | OUTPATIENT
Start: 2020-02-24 | End: 2021-02-18

## 2020-02-24 RX ORDER — CELECOXIB 200 MG/1
200 CAPSULE ORAL DAILY
Qty: 90 CAPSULE | Refills: 1 | Status: SHIPPED | OUTPATIENT
Start: 2020-02-24 | End: 2020-10-07

## 2020-02-26 ENCOUNTER — TELEPHONE (OUTPATIENT)
Dept: INTERNAL MEDICINE | Facility: CLINIC | Age: 60
End: 2020-02-26

## 2020-02-26 NOTE — TELEPHONE ENCOUNTER
Fax request from [facility] [phone].    Losartan 50 and 100 mg currently not available.    Please advise

## 2020-02-27 NOTE — TELEPHONE ENCOUNTER
OLIVIA    Pt returned call. JOHAN wolf in [place] have the medication and will take care of transferring    losartan 50 mg from [facility].

## 2020-02-27 NOTE — TELEPHONE ENCOUNTER
----- Message from Viet Ham sent at 2/27/2020 10:22 AM CST -----  Contact: Self [phone]  Pharmacy is calling to clarify an RX.  RX name:  losartan (COZAAR) 50 MG tablet  What do they need to clarify:  Replacement   Comments: [id number] - [place] - [id number] [address] Replaced by [facility], [address] [phone] (Phone)  [phone] (Fax)

## 2020-02-27 NOTE — TELEPHONE ENCOUNTER
Pt called regarding med refill. Losartan not available at her pharm. Pt states she totally out of medication.    Please advise.  Thanks

## 2020-02-27 NOTE — TELEPHONE ENCOUNTER
Does she have another pharmacy that the Rx could be phoned in to to see if they have availability

## 2020-02-27 NOTE — TELEPHONE ENCOUNTER
I spoke to pt,  She  will call pharmacies close to her to check on  availability and will call me back

## 2020-03-30 ENCOUNTER — PATIENT MESSAGE (OUTPATIENT)
Dept: RHEUMATOLOGY | Facility: CLINIC | Age: 60
End: 2020-03-30

## 2020-03-30 ENCOUNTER — TELEPHONE (OUTPATIENT)
Dept: RHEUMATOLOGY | Facility: CLINIC | Age: 60
End: 2020-03-30

## 2020-03-30 RX ORDER — ATORVASTATIN CALCIUM 20 MG/1
TABLET, FILM COATED ORAL
Qty: 90 TABLET | Refills: 3 | Status: SHIPPED | OUTPATIENT
Start: 2020-03-30 | End: 2021-07-24

## 2020-03-30 NOTE — TELEPHONE ENCOUNTER
AliB:** Due to COVID19 the patient has been rescheduled. NEW apt 05/01/2020 at 1:00pm. I tried to contact the patient via the phone but I was unable to reach her. I was able to leave a voicemail. I also sent a message via the patient portal to the patient. Apt letter printed to send via mail.**

## 2020-04-28 ENCOUNTER — PATIENT MESSAGE (OUTPATIENT)
Dept: RHEUMATOLOGY | Facility: CLINIC | Age: 60
End: 2020-04-28

## 2020-04-28 ENCOUNTER — TELEPHONE (OUTPATIENT)
Dept: RHEUMATOLOGY | Facility: CLINIC | Age: 60
End: 2020-04-28

## 2020-04-28 NOTE — TELEPHONE ENCOUNTER
Spoke with patient. Patient voices understanding that her appointment has been rescheduled for 6/1/2020 at 10:00 AM. Sent patient appointment reminder via mail.       ----- Message from Aruna Nieves sent at 4/28/2020  9:50 AM CDT -----  Contact: patient  Type: Returning Call    Who Called: patient  Who Left Message: Consuelo  Does the patient know what this is regarding?: change appt  Would the patient rather a call back or a response via [facility]? Call back  Best Call Back Number: [phone]  Additional Information: n/a

## 2020-04-28 NOTE — TELEPHONE ENCOUNTER
Called patient back. No answer. Left voicemail letting patient know that her appointment will be able to be rescheduled for the first week of June.

## 2020-05-14 ENCOUNTER — HOSPITAL ENCOUNTER (OUTPATIENT)
Dept: RADIOLOGY | Facility: HOSPITAL | Age: 60
Discharge: HOME | End: 2020-05-14
Attending: INTERNAL MEDICINE
Payer: MEDICARE

## 2020-05-14 DIAGNOSIS — Z12.31 ENCOUNTER FOR SCREENING MAMMOGRAM FOR BREAST CANCER: ICD-10-CM

## 2020-05-14 PROCEDURE — 77067 SCR MAMMO BI INCL CAD: CPT | Mod: 26,,, | Performed by: RADIOLOGY

## 2020-05-14 PROCEDURE — 77067 MAMMO DIGITAL SCREENING BILAT WITH TOMOSYNTHESIS_CAD: ICD-10-PCS | Mod: 26,,, | Performed by: RADIOLOGY

## 2020-05-14 PROCEDURE — 77067 SCR MAMMO BI INCL CAD: CPT | Mod: TC,PO

## 2020-05-14 PROCEDURE — 77063 BREAST TOMOSYNTHESIS BI: CPT | Mod: 26,,, | Performed by: RADIOLOGY

## 2020-05-14 PROCEDURE — 77063 MAMMO DIGITAL SCREENING BILAT WITH TOMOSYNTHESIS_CAD: ICD-10-PCS | Mod: 26,,, | Performed by: RADIOLOGY

## 2020-05-15 ENCOUNTER — TELEPHONE (OUTPATIENT)
Dept: INTERNAL MEDICINE | Facility: CLINIC | Age: 60
End: 2020-05-15

## 2020-05-15 NOTE — TELEPHONE ENCOUNTER
----- Message from Cristal Enriquez MD sent at 5/15/2020  2:01 PM CDT -----  Please note that your mammogram was read as follows  Impression:  There is no mammographic evidence of malignancy.  Cristal Tejeda

## 2020-05-22 ENCOUNTER — OFFICE VISIT (OUTPATIENT)
Dept: INTERNAL MEDICINE | Facility: CLINIC | Age: 60
End: 2020-05-22
Payer: MEDICARE

## 2020-05-22 VITALS
WEIGHT: 169.31 LBS | TEMPERATURE: 98 F | HEIGHT: 64 IN | BODY MASS INDEX: 28.91 KG/M2 | DIASTOLIC BLOOD PRESSURE: 70 MMHG | SYSTOLIC BLOOD PRESSURE: 122 MMHG | HEART RATE: 98 BPM | OXYGEN SATURATION: 99 % | RESPIRATION RATE: 18 BRPM

## 2020-05-22 DIAGNOSIS — Z01.419 WELL WOMAN EXAM WITH ROUTINE GYNECOLOGICAL EXAM: Primary | ICD-10-CM

## 2020-05-22 DIAGNOSIS — K29.70 GASTRITIS, PRESENCE OF BLEEDING UNSPECIFIED, UNSPECIFIED CHRONICITY, UNSPECIFIED GASTRITIS TYPE: ICD-10-CM

## 2020-05-22 DIAGNOSIS — Z01.419 PAP SMEAR, AS PART OF ROUTINE GYNECOLOGICAL EXAMINATION: ICD-10-CM

## 2020-05-22 PROCEDURE — 99214 OFFICE O/P EST MOD 30 MIN: CPT | Mod: S$GLB,,, | Performed by: INTERNAL MEDICINE

## 2020-05-22 PROCEDURE — 3078F DIAST BP <80 MM HG: CPT | Mod: CPTII,S$GLB,, | Performed by: INTERNAL MEDICINE

## 2020-05-22 PROCEDURE — 3008F BODY MASS INDEX DOCD: CPT | Mod: CPTII,S$GLB,, | Performed by: INTERNAL MEDICINE

## 2020-05-22 PROCEDURE — 88142 CYTOPATH C/V THIN LAYER: CPT | Performed by: PATHOLOGY

## 2020-05-22 PROCEDURE — 3008F PR BODY MASS INDEX (BMI) DOCUMENTED: ICD-10-PCS | Mod: CPTII,S$GLB,, | Performed by: INTERNAL MEDICINE

## 2020-05-22 PROCEDURE — 99999 PR PBB SHADOW E&M-EST. PATIENT-LVL IV: ICD-10-PCS | Mod: PBBFAC,,, | Performed by: INTERNAL MEDICINE

## 2020-05-22 PROCEDURE — 3074F SYST BP LT 130 MM HG: CPT | Mod: CPTII,S$GLB,, | Performed by: INTERNAL MEDICINE

## 2020-05-22 PROCEDURE — 88141 PR  CYTOPATH CERV/VAG INTERPRET: ICD-10-PCS | Mod: ,,, | Performed by: PATHOLOGY

## 2020-05-22 PROCEDURE — 3078F PR MOST RECENT DIASTOLIC BLOOD PRESSURE < 80 MM HG: ICD-10-PCS | Mod: CPTII,S$GLB,, | Performed by: INTERNAL MEDICINE

## 2020-05-22 PROCEDURE — 99999 PR PBB SHADOW E&M-EST. PATIENT-LVL IV: CPT | Mod: PBBFAC,,, | Performed by: INTERNAL MEDICINE

## 2020-05-22 PROCEDURE — 88141 CYTOPATH C/V INTERPRET: CPT | Mod: ,,, | Performed by: PATHOLOGY

## 2020-05-22 PROCEDURE — 3074F PR MOST RECENT SYSTOLIC BLOOD PRESSURE < 130 MM HG: ICD-10-PCS | Mod: CPTII,S$GLB,, | Performed by: INTERNAL MEDICINE

## 2020-05-22 PROCEDURE — 99214 PR OFFICE/OUTPT VISIT, EST, LEVL IV, 30-39 MIN: ICD-10-PCS | Mod: S$GLB,,, | Performed by: INTERNAL MEDICINE

## 2020-05-22 NOTE — PROGRESS NOTES
CC: WWE    58 yo female w/ HTN, IFBS, and HLD presents for WWE    Mammo- 5/2020- unremarkable  DEXA - severe osteopenia- femoral neck  declined Rx, would consider PT      Answers for HPI/ROS submitted by the patient on 5/21/2020   activity change: No  unexpected weight change: No  neck pain: No  hearing loss: No  rhinorrhea: No  trouble swallowing: No  eye discharge: No  visual disturbance: No  chest tightness: No  wheezing: No  chest pain: No  palpitations: No  blood in stool: No  Abdominal pain, midepigastric, occ takes a omeprazole  W/ improvement, averages 1-2 every week or so  Reviewed EGD-5/2017    Impression:   - Hiatal hernia.                        - Gastritis.   constipation: No  vomiting: No  diarrhea: No  polydipsia: No  polyuria: No  difficulty urinating: No  hematuria: No  menstrual problem: No  dysuria: No  joint swelling: No  arthralgias: Yes  headaches: No  weakness: No  confusion: No  dysphoric mood: No    PHYSICAL EXAM:   VS: stable  GENERAL: She is alert and oriented in no acute distress. WDWN, conversant and cooperative. Pleasant pt  EYES: Conjunctivae and lids are okay. Sclera anicteric    RESPIRATORY: Efforts are unlabored.   HEART: Regular rate and rhythm.   ABDOMEN: Bowel sounds present, soft,w/ mid-epigastric tenderness   Rectal: Heme negative  GYN: External genitalia, no hair pattern  No lesions or ulcers  Vaginal vault, unremarkable  Cervix slightly friable  Adnexal: bimanual unremarkable, no masses noted in adnexal area  MS: Gait nl, No CCE    NEURO: CARRILLO. No tremor noted  SKIN: Warm and dry    Imp/Plan:  WWE-Pap/pelvic- routine  Gastritis- start daily PPI  Caution diet  Call prn  RTC

## 2020-05-25 ENCOUNTER — TELEPHONE (OUTPATIENT)
Dept: INTERNAL MEDICINE | Facility: CLINIC | Age: 60
End: 2020-05-25

## 2020-05-25 NOTE — TELEPHONE ENCOUNTER
Pt was seen 5/22/20 and wanted a copy of her CT scan from 2018. I couldn't print it then due to printer issues. I printed it today and mailed her a copy

## 2020-05-28 ENCOUNTER — TELEPHONE (OUTPATIENT)
Dept: INTERNAL MEDICINE | Facility: CLINIC | Age: 60
End: 2020-05-28

## 2020-05-28 NOTE — TELEPHONE ENCOUNTER
----- Message from Cristal Enriquez MD sent at 5/28/2020 12:07 PM CDT -----  Ivelisse,   There were no abnormal cells noted on your pap, but there were not enough of them for the pathologist to give a definitive  Result, this finding is not uncommon when the vaginal tissue has thinned and there are fewer cells available when the sample is taken. As there is a low suspicion for an abnormal finding, repeating in a year would be reasonable.  Please do not hesitate to call/email if you have any questions or concerns.  Cristal Giordano

## 2020-06-01 ENCOUNTER — OFFICE VISIT (OUTPATIENT)
Dept: RHEUMATOLOGY | Facility: CLINIC | Age: 60
End: 2020-06-01
Payer: MEDICARE

## 2020-06-01 ENCOUNTER — HOSPITAL ENCOUNTER (OUTPATIENT)
Dept: RADIOLOGY | Facility: HOSPITAL | Age: 60
Discharge: HOME | End: 2020-06-01
Attending: INTERNAL MEDICINE
Payer: MEDICARE

## 2020-06-01 VITALS
TEMPERATURE: 98 F | BODY MASS INDEX: 29.06 KG/M2 | SYSTOLIC BLOOD PRESSURE: 140 MMHG | OXYGEN SATURATION: 96 % | HEART RATE: 72 BPM | HEIGHT: 64 IN | DIASTOLIC BLOOD PRESSURE: 90 MMHG | WEIGHT: 170.19 LBS

## 2020-06-01 DIAGNOSIS — M25.532 PAIN IN BOTH WRISTS: ICD-10-CM

## 2020-06-01 DIAGNOSIS — M25.531 PAIN IN BOTH WRISTS: ICD-10-CM

## 2020-06-01 DIAGNOSIS — M79.642 PAIN IN BOTH HANDS: Primary | ICD-10-CM

## 2020-06-01 DIAGNOSIS — M85.80 OSTEOPENIA, UNSPECIFIED LOCATION: ICD-10-CM

## 2020-06-01 DIAGNOSIS — M79.642 PAIN IN BOTH HANDS: ICD-10-CM

## 2020-06-01 DIAGNOSIS — K21.9 GASTROESOPHAGEAL REFLUX DISEASE, ESOPHAGITIS PRESENCE NOT SPECIFIED: ICD-10-CM

## 2020-06-01 DIAGNOSIS — G89.29 CHRONIC MIDLINE LOW BACK PAIN WITHOUT SCIATICA: ICD-10-CM

## 2020-06-01 DIAGNOSIS — M54.50 CHRONIC MIDLINE LOW BACK PAIN WITHOUT SCIATICA: ICD-10-CM

## 2020-06-01 DIAGNOSIS — M79.641 PAIN IN BOTH HANDS: ICD-10-CM

## 2020-06-01 DIAGNOSIS — M79.641 PAIN IN BOTH HANDS: Primary | ICD-10-CM

## 2020-06-01 PROCEDURE — 3008F BODY MASS INDEX DOCD: CPT | Mod: CPTII,S$GLB,, | Performed by: INTERNAL MEDICINE

## 2020-06-01 PROCEDURE — 99204 PR OFFICE/OUTPT VISIT, NEW, LEVL IV, 45-59 MIN: ICD-10-PCS | Mod: S$GLB,,, | Performed by: INTERNAL MEDICINE

## 2020-06-01 PROCEDURE — 99999 PR PBB SHADOW E&M-EST. PATIENT-LVL IV: CPT | Mod: PBBFAC,,, | Performed by: INTERNAL MEDICINE

## 2020-06-01 PROCEDURE — 99499 UNLISTED E&M SERVICE: CPT | Mod: S$GLB,,, | Performed by: INTERNAL MEDICINE

## 2020-06-01 PROCEDURE — 99204 OFFICE O/P NEW MOD 45 MIN: CPT | Mod: S$GLB,,, | Performed by: INTERNAL MEDICINE

## 2020-06-01 PROCEDURE — 3077F SYST BP >= 140 MM HG: CPT | Mod: CPTII,S$GLB,, | Performed by: INTERNAL MEDICINE

## 2020-06-01 PROCEDURE — 99999 PR PBB SHADOW E&M-EST. PATIENT-LVL IV: ICD-10-PCS | Mod: PBBFAC,,, | Performed by: INTERNAL MEDICINE

## 2020-06-01 PROCEDURE — 3008F PR BODY MASS INDEX (BMI) DOCUMENTED: ICD-10-PCS | Mod: CPTII,S$GLB,, | Performed by: INTERNAL MEDICINE

## 2020-06-01 PROCEDURE — 72110 X-RAY EXAM L-2 SPINE 4/>VWS: CPT | Mod: TC,FY,PO

## 2020-06-01 PROCEDURE — 73130 X-RAY EXAM OF HAND: CPT | Mod: 26,50,, | Performed by: RADIOLOGY

## 2020-06-01 PROCEDURE — 72110 XR LUMBAR SPINE COMPLETE 5 VIEW: ICD-10-PCS | Mod: 26,,, | Performed by: RADIOLOGY

## 2020-06-01 PROCEDURE — 72110 X-RAY EXAM L-2 SPINE 4/>VWS: CPT | Mod: 26,,, | Performed by: RADIOLOGY

## 2020-06-01 PROCEDURE — 73130 X-RAY EXAM OF HAND: CPT | Mod: TC,50,FY,PO

## 2020-06-01 PROCEDURE — 73130 XR HAND COMPLETE 3 VIEWS BILATERAL: ICD-10-PCS | Mod: 26,50,, | Performed by: RADIOLOGY

## 2020-06-01 PROCEDURE — 3077F PR MOST RECENT SYSTOLIC BLOOD PRESSURE >= 140 MM HG: ICD-10-PCS | Mod: CPTII,S$GLB,, | Performed by: INTERNAL MEDICINE

## 2020-06-01 PROCEDURE — 99499 RISK ADDL DX/OHS AUDIT: ICD-10-PCS | Mod: S$GLB,,, | Performed by: INTERNAL MEDICINE

## 2020-06-01 PROCEDURE — 3080F DIAST BP >= 90 MM HG: CPT | Mod: CPTII,S$GLB,, | Performed by: INTERNAL MEDICINE

## 2020-06-01 PROCEDURE — 3080F PR MOST RECENT DIASTOLIC BLOOD PRESSURE >= 90 MM HG: ICD-10-PCS | Mod: CPTII,S$GLB,, | Performed by: INTERNAL MEDICINE

## 2020-06-01 RX ORDER — DICLOFENAC SODIUM 10 MG/G
2 GEL TOPICAL 4 TIMES DAILY
Qty: 1 TUBE | Refills: 2 | Status: SHIPPED | OUTPATIENT
Start: 2020-06-01 | End: 2022-09-07

## 2020-06-01 NOTE — PATIENT INSTRUCTIONS
"OSTEOARTHRITIS    Fast Facts  Though some of the joint changes are irreversible, most patients will not need joint replacement surgery.  OA symptoms (what you feel) can vary greatly among patients.  A rheumatologist can detect arthritis and prescribe the proper treatment. The goal of treatment in OA is to reduce pain and improve function.  Exercise is an important part of OA treatment, because it can decrease joint pain and improve function.  At present, there is no treatment that can reverse the damage of OA in the joints. Researchers are trying to find ways to slow or reverse this joint damage.  Osteoarthritis (also known as OA) is a common joint disease that most often affects middle-age to elderly people. It is commonly referred to as "wear and tear" of the joints, but we now know that OA is a disease of the entire joint, involving the cartilage, joint lining, ligaments, and bone.  Although it is more common in older people, it is not really accurate to say that the joints are just wearing out. It is characterized by breakdown of the cartilage (the tissue that cushions the ends of the bones between joints), bony changes of the joints, deterioration of tendons and ligaments, and various degrees of inflammation of the joint lining (called the synovium).    This arthritis tends to occur in the hand joints, spine, hips, knees, and great toes. The lifetime risk of developing OA of the knee is about 46%, and the lifetime risk of developing OA of the hip is 25%, according to the [facility] Osteoarthritis Project, a long-term study from the [facility] and sponsored by the Centers for Disease Control and Prevention (often called the CDC) and the National Institutes of Health.    OA is a top cause of disability in older people. The goal of osteoarthritis treatment is to reduce pain and improve function. There is no cure for the disease, but some treatments attempt to slow disease " progression.    What is osteoarthritis?  OA is a frequently slowly progressive joint disease typically seen in middle-aged to elderly people.  In osteoarthritis, the cartilage between the bones in the joint breaks down. This causes the affected bones to slowly get bigger. The joint cartilage often breaks down because of mechanical stress or biochemical changes within the body, causing the bone underneath to fail. OA can occur together with other types of arthritis, such as gout or rheumatoid arthritis.    OA tends to affect commonly used joints such as the hands and spine, and the weight-bearing joints such as the hips and knees. Symptoms include:    Joint pain and stiffness  Knobby swelling at the joint  Cracking or grinding noise with joint movement  Decreased function of the joint    Who gets osteoarthritis?  OA affects people of all races and both sexes. Most often, it occurs in patients age 40 and above. However, it can occur sooner if you have other risk factors (things that raise the risk of getting OA).  Risk factors include:    Older age  Having family members with OA  Obesity  Previous traumatic Joint injury or repetitive use (overuse) of joints  Joint deformity such as unequal leg length, bowlegs or knocked knees    How is osteoarthritis diagnosed  Rheumatologists are doctors who are experts in diagnosing and treating arthritis and other diseases of the joints, muscles and bones. You may also need to see other health care providers, for instance, physical or occupational therapists and orthopedic doctors. Most often doctors detect OA based on the typical symptoms (described earlier) and on results of the physical exam. In some cases, X-rays or other imaging tests may be useful to tell the extent of disease or to help rule out other joint problems.    How do you treat osteoarthritis?  There is no proven treatment yet that can reverse joint damage from OA. The goal of osteoarthritis treatment is to reduce  pain and improve function of the affected joints. Most often, this is possible with a mixture of physical measures and drug therapy and, sometimes, surgery.    Physical measures: Weight loss and exercise are useful in OA. Excess weight puts stress on your knee joints and hips and low back. For every 10 pounds of weight you lose over 10 years, you can reduce the chance of developing knee OA by up to 50 percent. Exercise can improve your muscle strength, decrease joint pain and stiffness, and lower the chance of disability due to OA. Also helpful are support (assistive) devices, such as orthotics or a walking cane, that help you do daily activities. Heat or cold therapy can help relieve OA symptoms for a short time.    Certain alternative treatments such as spa (hot tub), massage, and chiropractic manipulation can help relieve pain for a short time. They can be costly, though, and require repeated treatments. Also, the long-term benefits of these alternative (sometimes called complementary or integrative) medicine treatments are unproven but are under study.    Drug therapy: Forms of drug therapy include topical, oral (by mouth) and injections (shots). You apply topical drugs directly on the skin over the affected joints. These medicines include capsaicin cream, lidocaine and diclofenac gel. Oral pain relievers such as acetaminophen are common first treatments. So are nonsteroidal anti-inflammatory drugs (often called NSAIDs), which decrease swelling and pain.    In 2010, the government (FDA) approved the use of duloxetine (Cymbalta) for chronic (long-term) musculoskeletal pain including from OA. This oral drug is not new. It also is in use for other health concerns, such as mood disorders, nerve pain and fibromyalgia.    Patients with more serious pain may need stronger medications, such as prescription narcotics.    Joint injections with corticosteroids (sometimes called cortisone shots) or with a form of  lubricant called hyaluronic acid can give months of pain relief from OA. This lubricant is given in the knee, and these shots may help delay the need for a knee replacement by a few years in some patients.    Surgery: Surgical treatment becomes an option for severe cases. This includes when the joint has serious damage, or when medical treatment fails to relieve pain and you have major loss of function. Surgery may involve arthroscopy, repair of the joint done through small incisions (cuts). If the joint damage cannot be repaired, you may need a joint replacement.    Supplements: Many over-the-counter nutrition supplements have been used for osteoarthritis treatment. Most lack good research data to support their effectiveness and safety. Among the most widely used are calcium, vitamin D and omega-3 fatty acids. To ensure safety and avoid drug interactions, consult your doctor or pharmacist before using any of these supplements. This is especially true when you are combining these supplements with prescribed drugs.    Living with osteoarthritis  There is no cure for OA, but you can manage how it affects your lifestyle. Some tips include:    Properly position and support your neck and back while sitting or sleeping.  Adjust furniture, such as raising a chair or toilet seat.  Avoid repeated motions of the joint, especially frequent bending.  Lose weight if you are overweight or obese, which can reduce pain and slow progression of OA.  Exercise each day.  Use adaptive devices that will help you do daily activities.  You might want to work with a physical therapist or occupational therapist to learn the best exercises and to choose arthritis assistive devices.    For additional information on osteoarthritis, you may want to visit the Arthritis Foundations website: www.arthritis.org.

## 2020-06-01 NOTE — PROGRESS NOTES
Subjective:       Patient ID: Ivelisse Hay is a 59 y.o. female.    Chief Complaint: joint pain  HPI  Pt is a 59 year old female with PMH of GERD, HLD, HTN, b/l carpal tunnel surgery, b/ rotator cuff shoulder who presented today for b/l hand, wrist and back pain.    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  She has had worsening of her pain over the last number of years.  Pt rates the pain a 7/10, howevre on a bad day she rates it a 10/10.  Soaking her hands in hot water or using a heating pad for her lower back helps but then she has pain once she stops.   Pt has noticed her knuckles are enlarged in her MCPs and PIPs with throbbing pain and pain around her CMC at the base of her thumbs.   She has difficulty opening jars.  Pt has morning stiffness lasting a couple of hours.     Her back pain got worse this year, she has always had some intermittent back pain. She now complains of pain on the sides of her back.  It wakes her from sleep and she needs to reposition.  Tylenol does not help with her joint or back pain.  Celebrex doesn't help much either.  Activity makes her back and hand pain worse.  She notices she will have a lot of hand pain after she cooks and has difficulty lifting objects at home.    Pt has had a colonoscopy and endoscopy for workup of her epigastric pain, she is denying blood or mucous in the bowel movement.  She stated she has seen GI previously in 2017.  She has epigastric pain and once she eats she will have to go to the bathroom.     Never smoker, social drinker, a few glasses of wine a week, no drug use. 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 Pt has two children,  No h/o pre-eclampsia or eclampsia.   Pt had history of 1 miscarriage, during 1st trimester. Patient denied family history of RA, lupus, psoriasis, scleroderma, sjogrens, sarcoidosis, UC or Crohn's disease.   "Father has passed away, Mother without medical issues.     Review of Systems   Constitutional: Negative for chills, diaphoresis, fatigue, fever and unexpected weight change.   HENT: Negative for congestion, hearing loss, mouth sores, nosebleeds, sore throat, trouble swallowing and voice change.    Eyes: Negative for photophobia, pain, redness and visual disturbance.   Respiratory: Negative for cough, chest tightness and shortness of breath.    Cardiovascular: Negative for chest pain and palpitations.   Gastrointestinal: Positive for abdominal pain and diarrhea. Negative for constipation, nausea and vomiting.   Genitourinary: Negative for difficulty urinating, dysuria, genital sores and hematuria.   Musculoskeletal: Positive for arthralgias, back pain, joint swelling and neck pain. Negative for myalgias and neck stiffness.   Skin: Negative for color change and rash.   Neurological: Negative for seizures, weakness, numbness and headaches.        Intermittent numbness of fingers during sleep.   Hematological: Does not bruise/bleed easily.   Psychiatric/Behavioral: Negative for agitation, confusion and sleep disturbance. The patient is not nervous/anxious.          Objective:   BP (!) 140/90 (BP Location: Left arm, Patient Position: Sitting, BP Method: Medium (Manual))   Pulse 72   Temp 98.4 °F (36.9 °C)   Ht 5' 4" (1.626 m)   Wt 77.2 kg (170 lb 3.2 oz)   SpO2 96%   BMI 29.21 kg/m²      Physical Exam   Nursing note and vitals reviewed.  Constitutional: She is oriented to person, place, and time and well-developed, well-nourished, and in no distress. No distress.   HENT:   Head: Normocephalic and atraumatic.   Right Ear: External ear normal.   Left Ear: External ear normal.   Mouth/Throat: Oropharynx is clear and moist. No oropharyngeal exudate.   Eyes: Conjunctivae and EOM are normal. Right eye exhibits no discharge. Left eye exhibits no discharge. No scleral icterus.   Neck: Neck supple. No tracheal deviation " present.   Cardiovascular: Normal rate, regular rhythm and normal heart sounds.    No murmur heard.  Pulmonary/Chest: Effort normal and breath sounds normal. No stridor. No respiratory distress. She has no wheezes. She has no rales.   Abdominal: Soft. Bowel sounds are normal. She exhibits no distension. There is no tenderness. There is no rebound.   Neurological: She is alert and oriented to person, place, and time.   Skin: Skin is warm and dry. No rash noted. She is not diaphoretic.     Psychiatric: Mood and affect normal.   Musculoskeletal: Normal range of motion. She exhibits tenderness and deformity. She exhibits no edema.   No active synovitis, enthesitis, dactylitis or effusion noted on exam      Tenderness of 2nd/3rd MCP and multiple PIPs on exam and wrists with ROM    squarring of CMC b/l and OA changes b/l hands PIPs.        xrays of b/l knees done 8/13/2019 independently reviewed showing DJD.    Assessment:       1. Pain in both hands    2. Pain in both wrists    3. Osteopenia, unspecified location    4. Chronic midline low back pain without sciatica    5. Gastroesophageal reflux disease, esophagitis presence not specified        Pt is a 59 year old female with PMH of GERD, HLD, HTN, b/l carpal tunnel surgery, b/ rotator cuff shoulder who presented today for b/l hand, wrist and back pain.  Pts exam more consistent with OA however given prolonged morning stiffness will obtain further workup to rule out inflammatory etiologies.    Lower back pain does not sound inflammatory in nature, more likely due to DJD.    Plan:       -obtain labs, urine and xrays below  -prescribed voltaren gel  -avoid oral NSAIDs given GERD/gastritis  -RTC in 4 weeks.  Problem List Items Addressed This Visit     None      Visit Diagnoses     Pain in both hands    -  Primary    Relevant Medications    diclofenac sodium (VOLTAREN) 1 % Gel    Other Relevant Orders    X-Ray Lumbar Spine Complete 5 View (Completed)    X-Ray Hand 3 View  Bilateral    MIKAELA Screen w/Reflex    Sjogrens syndrome-A extractable nuclear antibody    Rheumatoid factor    Cyclic Citrullinated Peptide Antibody, IgG    CBC auto differential    Comprehensive metabolic panel    C-Reactive Protein    Sedimentation rate    Urinalysis    Protein / creatinine ratio, urine    Protein electrophoresis, serum    Immunofixation electrophoresis    Pain in both wrists        Relevant Medications    diclofenac sodium (VOLTAREN) 1 % Gel    Other Relevant Orders    X-Ray Lumbar Spine Complete 5 View (Completed)    X-Ray Hand 3 View Bilateral    MIKAELA Screen w/Reflex    Sjogrens syndrome-A extractable nuclear antibody    Rheumatoid factor    Cyclic Citrullinated Peptide Antibody, IgG    CBC auto differential    Comprehensive metabolic panel    C-Reactive Protein    Sedimentation rate    Urinalysis    Protein / creatinine ratio, urine    Protein electrophoresis, serum    Immunofixation electrophoresis    Osteopenia, unspecified location        Relevant Orders    Vitamin D    PTH, intact    Chronic midline low back pain without sciatica        Relevant Orders    X-Ray Lumbar Spine Complete 5 View (Completed)    Gastroesophageal reflux disease, esophagitis presence not specified

## 2020-06-01 NOTE — LETTER
June 1, 2020      Cristal Enriquez MD  [address]  [place] [address]           [place] - Rheumatology  [address]  [place] [address]  Phone: [phone]  Fax: [phone]          Patient: Ivelisse Hay   MR Number: [id number]   YOB: 1960   Date of Visit: 6/1/2020       Dear Dr. Cristal Enriquez:    Thank you for referring Ivelisse Hay to me for evaluation. Attached you will find relevant portions of my assessment and plan of care.    If you have questions, please do not hesitate to call me. I look forward to following Ivelisse Hay along with you.    Sincerely,    Debora Reed, DO    Enclosure  CC:  No Recipients    If you would like to receive this communication electronically, please contact [email or web address] or [phone] to request more information on [employer] access.    For providers and/or their staff who would like to refer a patient to [facility], please contact us through our one-stop-shop provider referral line, [facility], at [phone].    If you feel you have received this communication in error or would no longer like to receive these types of communications, please e-mail [email or web address]

## 2020-09-18 ENCOUNTER — TELEPHONE (OUTPATIENT)
Dept: RHEUMATOLOGY | Facility: CLINIC | Age: 60
End: 2020-09-18

## 2020-09-18 NOTE — TELEPHONE ENCOUNTER
Dr Reed will not be in clinic on 09/25/2020. All patient's scheduled for 09/25/2020 must be rescheduled.    I called the patient but I was not able to reach him/her. I did leave a detailed voicemail.   Awaiting a callback.

## 2020-09-23 ENCOUNTER — TELEPHONE (OUTPATIENT)
Dept: RHEUMATOLOGY | Facility: CLINIC | Age: 60
End: 2020-09-23

## 2020-09-23 NOTE — TELEPHONE ENCOUNTER
----- Message from Honey Kelsey sent at 9/23/2020 10:51 AM CDT -----  Regarding: pt advice  Pt returned call regarding appt.    Pt can be reached at [phone]

## 2020-09-23 NOTE — TELEPHONE ENCOUNTER
Appointment Access  Message Contents   Honye Cazares Staff   Caller: Unspecified (Today, 10:51 AM)             Pt returned call regarding appt.     Pt can be reached at [phone]      Pt rescheduled appointment.

## 2020-09-28 ENCOUNTER — TELEPHONE (OUTPATIENT)
Dept: RHEUMATOLOGY | Facility: CLINIC | Age: 60
End: 2020-09-28

## 2020-09-28 NOTE — TELEPHONE ENCOUNTER
Per patient's request her follow up appointment be moved up. Her new apt is 10/07 at 10:30 am. Pt voices understanding.

## 2020-09-28 NOTE — TELEPHONE ENCOUNTER
----- Message from Aston Jin MA sent at 9/25/2020 10:46 AM CDT -----  Regarding: sooner appointment  I spoke to the patient and she wants to know if Dr. Andino can fit he in before the 21st of October. Please advise.  ----- Message -----  From: Morgan Hammonds  Sent: 9/25/2020  10:25 AM CDT  To: Derek Cazares Staff    Type:  Needs Medical Advice    Who Called: Ivelisse   Symptoms (please be specific): pt wants to know if Dr. Andino has any sooner appts than 10/21, wants to speak with Camille regarding this   How long has patient had these symptoms:  unknown  Pharmacy name and phone #:  n/a  Would the patient rather a call back or a response via [facility]? Call back  Best Call Back Number:  [phone]  Additional Information: none

## 2020-09-30 ENCOUNTER — TELEPHONE (OUTPATIENT)
Dept: GASTROENTEROLOGY | Facility: CLINIC | Age: 60
End: 2020-09-30

## 2020-09-30 NOTE — TELEPHONE ENCOUNTER
Spoke with pt advised her that the appt she has with  is the soonest available. Pt advised that her appt is on the waiting list for if another pt decides to cancel. Pt verbalized understanding.      ----- Message from Shauna Arreguin MA sent at 9/30/2020 11:24 AM CDT -----  Regarding: FW: Appointment  Contact: JENNIFER CLEVELAND [[id number]]    ----- Message -----  From: Bert Cary  Sent: 9/30/2020  11:17 AM CDT  To: Sandro Jo Staff  Subject: Appointment                                      Name of Who is Calling: JENNIFER CLEVELAND [[id number]]      What is the request in detail: Would like to speak with staff in regards to an appointment sooner then November for upper GI issues. Please advise      Can the clinic reply by [facility]: yes      [facility] [phone]

## 2020-10-07 ENCOUNTER — OFFICE VISIT (OUTPATIENT)
Dept: RHEUMATOLOGY | Facility: CLINIC | Age: 60
End: 2020-10-07
Payer: MEDICARE

## 2020-10-07 ENCOUNTER — PATIENT MESSAGE (OUTPATIENT)
Dept: RHEUMATOLOGY | Facility: CLINIC | Age: 60
End: 2020-10-07

## 2020-10-07 ENCOUNTER — LAB VISIT (OUTPATIENT)
Dept: LAB | Facility: HOSPITAL | Age: 60
End: 2020-10-07
Attending: INTERNAL MEDICINE
Payer: MEDICARE

## 2020-10-07 VITALS
WEIGHT: 172.19 LBS | HEART RATE: 90 BPM | SYSTOLIC BLOOD PRESSURE: 133 MMHG | OXYGEN SATURATION: 99 % | DIASTOLIC BLOOD PRESSURE: 75 MMHG | TEMPERATURE: 97 F | BODY MASS INDEX: 29.4 KG/M2 | HEIGHT: 64 IN

## 2020-10-07 DIAGNOSIS — R79.82 ELEVATED C-REACTIVE PROTEIN (CRP): ICD-10-CM

## 2020-10-07 DIAGNOSIS — M81.8 OTHER OSTEOPOROSIS WITHOUT CURRENT PATHOLOGICAL FRACTURE: ICD-10-CM

## 2020-10-07 DIAGNOSIS — M19.041 PRIMARY OSTEOARTHRITIS OF BOTH HANDS: ICD-10-CM

## 2020-10-07 DIAGNOSIS — M19.042 PRIMARY OSTEOARTHRITIS OF BOTH HANDS: ICD-10-CM

## 2020-10-07 DIAGNOSIS — Z79.899 HIGH RISK MEDICATION USE: ICD-10-CM

## 2020-10-07 DIAGNOSIS — Z55.9 EDUCATIONAL CIRCUMSTANCE: ICD-10-CM

## 2020-10-07 DIAGNOSIS — M79.642 PAIN IN BOTH HANDS: ICD-10-CM

## 2020-10-07 DIAGNOSIS — M79.641 PAIN IN BOTH HANDS: ICD-10-CM

## 2020-10-07 DIAGNOSIS — M54.50 LEFT-SIDED LOW BACK PAIN WITHOUT SCIATICA, UNSPECIFIED CHRONICITY: ICD-10-CM

## 2020-10-07 DIAGNOSIS — M81.8 OTHER OSTEOPOROSIS WITHOUT CURRENT PATHOLOGICAL FRACTURE: Primary | ICD-10-CM

## 2020-10-07 LAB
ALBUMIN SERPL BCP-MCNC: 4 G/DL (ref 3.5–5.2)
ALP SERPL-CCNC: 110 U/L (ref 55–135)
ALT SERPL W/O P-5'-P-CCNC: 28 U/L (ref 10–44)
ANION GAP SERPL CALC-SCNC: 10 MMOL/L (ref 8–16)
AST SERPL-CCNC: 23 U/L (ref 10–40)
BILIRUB SERPL-MCNC: 0.1 MG/DL (ref 0.1–1)
BUN SERPL-MCNC: 21 MG/DL (ref 6–20)
CALCIUM SERPL-MCNC: 9.6 MG/DL (ref 8.7–10.5)
CHLORIDE SERPL-SCNC: 105 MMOL/L (ref 95–110)
CO2 SERPL-SCNC: 28 MMOL/L (ref 23–29)
CREAT SERPL-MCNC: 1 MG/DL (ref 0.5–1.4)
CRP SERPL-MCNC: 11.1 MG/L (ref 0–8.2)
EST. GFR  (AFRICAN AMERICAN): >60 ML/MIN/1.73 M^2
EST. GFR  (NON AFRICAN AMERICAN): >60 ML/MIN/1.73 M^2
GLUCOSE SERPL-MCNC: 110 MG/DL (ref 70–110)
POTASSIUM SERPL-SCNC: 4.1 MMOL/L (ref 3.5–5.1)
PROT SERPL-MCNC: 7 G/DL (ref 6–8.4)
SODIUM SERPL-SCNC: 143 MMOL/L (ref 136–145)

## 2020-10-07 PROCEDURE — 3008F PR BODY MASS INDEX (BMI) DOCUMENTED: ICD-10-PCS | Mod: CPTII,S$GLB,, | Performed by: INTERNAL MEDICINE

## 2020-10-07 PROCEDURE — 36415 COLL VENOUS BLD VENIPUNCTURE: CPT | Mod: PO

## 2020-10-07 PROCEDURE — 3078F PR MOST RECENT DIASTOLIC BLOOD PRESSURE < 80 MM HG: ICD-10-PCS | Mod: CPTII,S$GLB,, | Performed by: INTERNAL MEDICINE

## 2020-10-07 PROCEDURE — 80053 COMPREHEN METABOLIC PANEL: CPT

## 2020-10-07 PROCEDURE — 3008F BODY MASS INDEX DOCD: CPT | Mod: CPTII,S$GLB,, | Performed by: INTERNAL MEDICINE

## 2020-10-07 PROCEDURE — 99999 PR PBB SHADOW E&M-EST. PATIENT-LVL V: CPT | Mod: PBBFAC,,, | Performed by: INTERNAL MEDICINE

## 2020-10-07 PROCEDURE — 99215 OFFICE O/P EST HI 40 MIN: CPT | Mod: S$GLB,,, | Performed by: INTERNAL MEDICINE

## 2020-10-07 PROCEDURE — 3078F DIAST BP <80 MM HG: CPT | Mod: CPTII,S$GLB,, | Performed by: INTERNAL MEDICINE

## 2020-10-07 PROCEDURE — 86140 C-REACTIVE PROTEIN: CPT

## 2020-10-07 PROCEDURE — 3075F SYST BP GE 130 - 139MM HG: CPT | Mod: CPTII,S$GLB,, | Performed by: INTERNAL MEDICINE

## 2020-10-07 PROCEDURE — 99999 PR PBB SHADOW E&M-EST. PATIENT-LVL V: ICD-10-PCS | Mod: PBBFAC,,, | Performed by: INTERNAL MEDICINE

## 2020-10-07 PROCEDURE — 99215 PR OFFICE/OUTPT VISIT, EST, LEVL V, 40-54 MIN: ICD-10-PCS | Mod: S$GLB,,, | Performed by: INTERNAL MEDICINE

## 2020-10-07 PROCEDURE — 3075F PR MOST RECENT SYSTOLIC BLOOD PRESS GE 130-139MM HG: ICD-10-PCS | Mod: CPTII,S$GLB,, | Performed by: INTERNAL MEDICINE

## 2020-10-07 RX ORDER — MELOXICAM 15 MG/1
15 TABLET ORAL DAILY
Qty: 30 TABLET | Refills: 2 | Status: SHIPPED | OUTPATIENT
Start: 2020-10-07 | End: 2021-01-06

## 2020-10-07 RX ORDER — OMEPRAZOLE 20 MG/1
20 CAPSULE, DELAYED RELEASE ORAL DAILY
COMMUNITY
End: 2020-12-09 | Stop reason: SDUPTHER

## 2020-10-07 RX ORDER — INFLUENZA A VIRUS A/VICTORIA/2454/2019 IVR-207 (H1N1) ANTIGEN (PROPIOLACTONE INACTIVATED), INFLUENZA A VIRUS A/HONG KONG/2671/2019 IVR-208 (H3N2) ANTIGEN (PROPIOLACTONE INACTIVATED), INFLUENZA B VIRUS B/VICTORIA/705/2018 BVR-11 ANTIGEN (PROPIOLACTONE INACTIVATED), INFLUENZA B VIRUS B/PHUKET/3073/2013 BVR-1B ANTIGEN (PROPIOLACTONE INACTIVATED) 15; 15; 15; 15 UG/.5ML; UG/.5ML; UG/.5ML; UG/.5ML
INJECTION, SUSPENSION INTRAMUSCULAR
COMMUNITY
Start: 2020-09-04 | End: 2022-09-07 | Stop reason: ALTCHOICE

## 2020-10-07 SDOH — SOCIAL DETERMINANTS OF HEALTH (SDOH): PROBLEMS RELATED TO EDUCATION AND LITERACY, UNSPECIFIED: Z55.9

## 2020-10-07 NOTE — PATIENT INSTRUCTIONS
Denosumab (Prolia) is a biologic medication used to prevent fragile bones, also known as osteoporosis, and bone fractures. As a biologic drug, it is man-made and closely resembles proteins, called antibodies, which naturally occur in the body. In this case, it stops a molecule, called RANKL, which normally causes bone turnover. By blocking RANKL, it helps to strengthen bones. Denosumab is generally not viewed as a first line therapy and is usually given to patients when they are unable to take bisphosphonates or have unsuccessful results.    How to Take It  Denosumab is given as an injection just under the skin. It is given by your doctor or nurse. The usual dose is 60mg every six months, and it should be taken with calcium (1000mg daily) and vitamin D (at least 400 IU) supplementation. Denosumab works quickly - within three days of an injection, the effects on reducing bone turnover can be detected in the blood; the drug also stays in the blood for several months after stopping the medicine. Denosumab may be given in combination with other agents to reduce bone turnover.    Side Effects  The most common side effects that patients experience are back pain, pain in the hands and feet, and increased cholesterol. The pain from denosumab can last a few days up to a few months after administration.</p>    The rarer, but worrisome side effects include an increased risk for infections (especially if youre already taking medications that lower your immune system), low calcium levels (called hypocalcemia), breakdown of bones in the jaw (called osteonecrosis of the jaw), and severe allergic reactions including anaphylaxis.    Tell Your Doctor  Some symptoms may occur normally while taking denosumab, however, you should notify your doctor if you have these symptoms while taking this medication: tingling of the hands or around the mouth, new pain in the jaw, or fevers or other signs of infection.    Make sure to notify your  other physicians while you are taking this drug. If you are pregnant or considering pregnancy, let your doctor know before starting this medication. Women should discuss birth control with their primary care physicians or gynecologists. Breast-feeding should be avoided while taking denosumab because the drug can enter breast milk.    Bisphosphonates are a group of medicines used to treat bone problems, such as thin or fragile bones. These medications are given to patients with abnormal bone densities or to patients with a history of abnormal bone fractures in the hip, arm, wrist, or spine. The medications help prevent future bone fractures. They are commonly prescribed after calcium and vitamin D have failed to keep the bones strong. They are commonly used to treat bone disorders such as osteopenia, osteoporosis, Paget's disease, and metastatic bone disease.    The family of Bisphosphonates includes:    Oral: Alendronate (Fosamax), risedronate (Actonel), and ibandronate (Boniva)  IV: Pamidronate, and zoledronic acid (Reclast/Zometa)  The bone cells in our bodies are constantly being slowly removed and replaced with new bone cells. This happens throughout our entire life.  Osteoclasts are cells in our bodies that remove old bone and the osteoblast cells replace it with new bone. Bisphosphonates work by stopping the removal of old bone osteoclast activity. As we age and in certain diseases, the bone is actually being removed or damaged faster than your body is able to replace it.  This leaves the bone thin/weak and much more likely to fracture with a significant impact or fall. Bisphosphonate medications damage/kill the osteoclasts and stop removal of old bone to try and preserve your bone strength.    How to Take It  Alendronate, risedronate and ibandronate are oral medications given either daily, weekly, or monthly depending on your other medical issues and the disease being treated. Typically, alendronate is given  70mg by mouth once a week and risedronate is given by mouth 35mg weekly or 150mg monthly.  Ibandronate is given orally 150 mg once a month.  The dose may vary depending on the disease being treated. These medications have to be taken first thing in the morning on an empty stomach with eight ounces of water. Do not take it with other beverages.  You must remain upright (sitting or standing - no lying down) for 30 minutes after taking the medication.  Do not take any additional medications, beverages, or food for 45 - 60 minutes after taking the medication. These medications are usually discontinued after five years if the fracture risk is low.    Zolendronic acid is given as an infusion through a vein in your arm at your doctors office. The dose is 5mg given once a year, which is discontinued after five years if the fracture risk is low.    Calcium and vitamin D are commonly recommended to be taken with these medications.    Side Effects  Side effects to oral bisphosphonates include: muscle cramps/pain, pain with swallowing, heartburn, abdominal pain, nausea, headache, and/or rash. There is a rare risk of developing jaw or tooth pain, called osteonecrosis of the jaw. It is typically associated with trauma to the jaw (tooth extraction), history of malignancy and/or infections while on bisphosphonate therapy. It is recommended you have a good dental exam prior to starting these medications. Notify your doctor is you develop side effects to the medications.    Side effects to Zoledronic acid include low blood pressure, dizziness, fatigue, headaches, muscle pain, weakness, GI symptoms (nausea and constipation), fever, and/or rash. These side effects may last a few days to a week after your infusion. There is a rare risk of developing jaw or tooth pain, called osteonecrosis of the jaw. It is typically associated with trauma to the jaw (tooth extraction), history of malignancy, and/or infections while on bisphosphonate  therapy. It is recommended you have a good dental exam prior to starting these medications.  Notify your doctor if you develop side effects to the medications.    Abnormal fractures of the femur (thigh bone) have been associated with bisphosphonate therapy, especially after use for many years. This typically presents as thigh pain.    Tell Your Doctor  Bisphosphonate medications stay in your body for long periods of time even after stopping the medication.  Discontinue therapy with bisphosphonates after five years of use if your fracture risk is low.  Continue to follow up with your doctor for monitoring and repeat bone density scans about every two years. Prolonged use of bisphosphonates increases the risk of stress fractures in your thigh/hip. Tell your doctor if you develop any new pain in the thighs.    You should not take this medication if you have kidney problems, low calcium levels, or the inability to stand or sit upright for at least 30 minutes. If you have difficulty swallowing or history of heartburn, you should not take an oral bisphosphonate; an infusion with zoledronic acid may be preferred.    Do not take these medications if you are breastfeeding, pregnant, or may become pregnant.    Remembering to take these medications regularly and as directed will increase your benefits from the medications. You can stop the medication at any time, but please inform your doctor if you do.

## 2020-10-07 NOTE — PROGRESS NOTES
Subjective:       Patient ID: Ivelisse Hay is a 60 y.o. female.    Chief Complaint: joint pain  HPI  Pt is a 59 year old female with PMH of GERD, HLD, HTN, b/l carpal tunnel surgery, b/ rotator cuff shoulder who presented initially on 6/1/2020 for b/l hand, wrist and back pain. Workup showed normal esr, CRP of 18.4, negative MIKAELA/SSA/RF/CCP, normal SPEP/ALIDA, negative HLAB27, urine without proteinuria or hematuria.  Pt found to have compression fracture on lumbar spine film and DEXA showing osteopenia in 5/2020.  Pt returns today for follow up.    Interval history:  Pt rates her pain today a 4/10 in her hands.   She finds her hand pain is worse with activity and difficulty opening bottles. Pt admits to morning stiffness lasting until about noon but she will continue to have pain in her hands.  She has lower back pain and left side lower back bother her as well.  Pt is taking celebrex 200mg daily.    Pt has never been on treatment for osteoporosis prior. No upcoming dental work that she is aware of but is seeing her dentist next week.     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  She has had worsening of her pain over the last number of years.  Pt rates the pain a 7/10, howevre on a bad day she rates it a 10/10.  Soaking her hands in hot water or using a heating pad for her lower back helps but then she has pain once she stops.   Pt has noticed her knuckles are enlarged in her MCPs and PIPs with throbbing pain and pain around her CMC at the base of her thumbs.   She has difficulty opening jars.  Pt has morning stiffness lasting a couple of hours.  Her back pain got worse this year, she has always had some intermittent back pain. She now complains of pain on the sides of her back.  It wakes her from sleep and she needs to reposition.  Tylenol does not help with her joint or back pain.  Celebrex  doesn't help much either.  Activity makes her back and hand pain worse.  She notices she will have a lot of hand pain after she cooks and has difficulty lifting objects at home. Pt has had a colonoscopy and endoscopy for workup of her epigastric pain, she is denying blood or mucous in the bowel movement.  She stated she has seen GI previously in 2017.  She has epigastric pain and once she eats she will have to go to the bathroom.     Never smoker, social drinker, a few glasses of wine a week, no drug use. 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 Pt has two children,  No h/o pre-eclampsia or eclampsia.   Pt had history of 1 miscarriage, during 1st trimester. Patient denied family history of RA, lupus, psoriasis, scleroderma, sjogrens, sarcoidosis, UC or Crohn's disease.  Father has passed away, Mother without medical issues.     Review of Systems   Constitutional: Negative for chills, diaphoresis, fatigue, fever and unexpected weight change.   HENT: Negative for congestion, hearing loss, mouth sores, nosebleeds, sore throat, trouble swallowing and voice change.    Eyes: Negative for photophobia, pain, redness and visual disturbance.   Respiratory: Negative for cough, chest tightness and shortness of breath.    Cardiovascular: Negative for chest pain and palpitations.   Gastrointestinal: Positive for abdominal pain and diarrhea. Negative for constipation, nausea and vomiting.   Genitourinary: Negative for difficulty urinating, dysuria, genital sores and hematuria.   Musculoskeletal: Positive for arthralgias, back pain, joint swelling and neck pain. Negative for myalgias and neck stiffness.   Skin: Negative for color change and rash.   Neurological: Negative for seizures, weakness, numbness and headaches.        Intermittent numbness of fingers during sleep.   Hematological: Does not bruise/bleed easily.   Psychiatric/Behavioral: Negative for agitation, confusion and sleep disturbance.  "The patient is not nervous/anxious.          Objective:   /75   Pulse 90   Temp 97.2 °F (36.2 °C)   Ht 5' 4" (1.626 m)   Wt 78.1 kg (172 lb 3.2 oz)   SpO2 99%   BMI 29.56 kg/m²      Physical Exam   Nursing note and vitals reviewed.  Constitutional: She is oriented to person, place, and time and well-developed, well-nourished, and in no distress. No distress.   HENT:   Head: Normocephalic and atraumatic.   Right Ear: External ear normal.   Left Ear: External ear normal.   Mouth/Throat: Oropharynx is clear and moist. No oropharyngeal exudate.   Eyes: Conjunctivae and EOM are normal. Right eye exhibits no discharge. Left eye exhibits no discharge. No scleral icterus.   Neck: Neck supple. No tracheal deviation present.   Cardiovascular: Normal rate, regular rhythm and normal heart sounds.    No murmur heard.  Pulmonary/Chest: Effort normal and breath sounds normal. No stridor. No respiratory distress. She has no wheezes. She has no rales.   Abdominal: Soft. Bowel sounds are normal. She exhibits no distension. There is no abdominal tenderness. There is no rebound.   Neurological: She is alert and oriented to person, place, and time.   Skin: Skin is warm and dry. No rash noted. She is not diaphoretic.     Psychiatric: Mood and affect normal.   Musculoskeletal: Normal range of motion. Tenderness and deformity present. No edema.      Comments: No active synovitis, enthesitis, dactylitis or effusion noted on exam      Tenderness of 2nd/3rd MCP and multiple PIPs on exam and wrists with ROM    squarring of CMC b/l and OA changes b/l hands PIPs.        xrays of b/l knees done 8/13/2019 independently reviewed showing DJD.    Assessment:       1. Other osteoporosis without current pathological fracture    2. Left-sided low back pain without sciatica, unspecified chronicity    3. High risk medication use    4. Educational circumstance    5. Elevated C-reactive protein (CRP)    6. Primary osteoarthritis of both hands  "       Pt is a 59 year old female with PMH of GERD, HLD, HTN, b/l carpal tunnel surgery, b/ rotator cuff shoulder who presented initially on 6/1/2020 for b/l hand, wrist and back pain. Workup showed normal esr, CRP of 18.4, negative MIKAELA/SSA/RF/CCP, normal SPEP/ALIDA, negative HLAB27, urine without proteinuria or hematuria.  Pt found to have compression fracture on lumbar spine film and DEXA showing osteopenia in 5/2020.  Pt returns today for follow up.    Lower back pain does not sound inflammatory in nature, more likely due to DJD.    Lengthy discussion had with patient regarding treatment for osteoporosis    Plan:       -obtain labs as below  -gave information to read on reclast and prolia- risks and benefits of medications discussed including but not limited to osteonecrosis of jaw, subtrochanteric fractures, hypocalcemia.  -pt to get dental clearance and have dental note faxed to our office.  -PT referral placed for lumbar back pain  -consider MRI of hands in future if continues to have prolonged morning stiffness.  -prescribed meloxicam, stop celebrex, avoid other NSAIDs and take on full stomach  -RTC in 6 weeks  Problem List Items Addressed This Visit     None      Visit Diagnoses     Other osteoporosis without current pathological fracture    -  Primary    Relevant Orders    Comprehensive Metabolic Panel    Left-sided low back pain without sciatica, unspecified chronicity        Relevant Orders    Ambulatory referral/consult to Physical/Occupational Therapy    High risk medication use        Educational circumstance        Elevated C-reactive protein (CRP)        Primary osteoarthritis of both hands        Relevant Medications    meloxicam (MOBIC) 15 MG tablet    Other Relevant Orders    Comprehensive Metabolic Panel    C-Reactive Protein        Atleast 40 minutes of time was spent face to face with the patient with >50% discussing lab results, lifestyle changes, medications or counseling patient and  coordinating care for their disease.  All patients questions answered appropriately and to the patients satisfaction.

## 2020-10-23 PROBLEM — G89.29 CHRONIC LOW BACK PAIN WITH SCIATICA: Status: RESOLVED | Noted: 2017-06-12 | Resolved: 2020-10-23

## 2020-10-23 PROBLEM — Z74.09 IMPAIRED FUNCTIONAL MOBILITY AND ACTIVITY TOLERANCE: Status: ACTIVE | Noted: 2020-10-23

## 2020-10-23 PROBLEM — M54.40 CHRONIC LOW BACK PAIN WITH SCIATICA: Status: RESOLVED | Noted: 2017-06-12 | Resolved: 2020-10-23

## 2020-10-23 PROBLEM — M54.2 CHRONIC NECK PAIN: Status: RESOLVED | Noted: 2017-06-12 | Resolved: 2020-10-23

## 2020-10-23 PROBLEM — G89.29 CHRONIC NECK PAIN: Status: RESOLVED | Noted: 2017-06-12 | Resolved: 2020-10-23

## 2020-10-27 ENCOUNTER — TELEPHONE (OUTPATIENT)
Dept: RHEUMATOLOGY | Facility: CLINIC | Age: 60
End: 2020-10-27

## 2020-10-27 DIAGNOSIS — M54.50 LEFT-SIDED LOW BACK PAIN WITHOUT SCIATICA, UNSPECIFIED CHRONICITY: ICD-10-CM

## 2020-10-27 DIAGNOSIS — M81.8 OTHER OSTEOPOROSIS WITHOUT CURRENT PATHOLOGICAL FRACTURE: Primary | ICD-10-CM

## 2020-10-27 NOTE — TELEPHONE ENCOUNTER
prolia ordered. Discussed with patient and answered all of her questions. Risks and benefits of Prolia discussed with patient.  Pt aware that risks of prolia include but are not limited to arthralgias, hypocalcemia, osteonecrosis of jaw and subtrochanteric fractures. Pt agrees to starting the medication.

## 2020-11-15 NOTE — TELEPHONE ENCOUNTER
"    Reason for Disposition   SEVERE dizziness (e.g., unable to stand, requires support to walk, feels like passing out now)    Protocols used: ST DIZZINESS-A-[place]    Patient states for the last two days she has been feeling like the room is spinning and sometimes she has to sit down or hold on to something to keep her from falling. She has at times felt as though she may pass out. When she is sitting down she feels okay. She does report having diarrhea the past few days as well. She states she is drinking "a lot" of water and urinating regularly. She denies sob or chest pain. Her  is at home with her. Advised to be seen in ED and to have another adult to bring her. She does not want to do this. She is asking for a call back from her PCP nurse to get an appointment scheduled for tomorrow (after two pm if possible). Advised again regarding protocol recommendation. She doesn't feel this is necessary. Advised to drink fluids with electrolytes (gatorade or powerade) since she is having diarrhea and get up slowly when going from sitting to standing. Please contact caller with any further care advice.   "
2

## 2020-12-07 PROBLEM — Z74.09 IMPAIRED FUNCTIONAL MOBILITY AND ACTIVITY TOLERANCE: Status: RESOLVED | Noted: 2020-10-23 | Resolved: 2020-12-03

## 2020-12-08 ENCOUNTER — PATIENT MESSAGE (OUTPATIENT)
Dept: OTOLARYNGOLOGY | Facility: CLINIC | Age: 60
End: 2020-12-08

## 2020-12-08 ENCOUNTER — PATIENT OUTREACH (OUTPATIENT)
Dept: ADMINISTRATIVE | Facility: OTHER | Age: 60
End: 2020-12-08

## 2020-12-08 DIAGNOSIS — Z12.11 SCREEN FOR COLON CANCER: Primary | ICD-10-CM

## 2020-12-08 NOTE — PROGRESS NOTES
LINKS immunization registry updated  Care Everywhere updated  Health Maintenance updated  Chart reviewed for overdue Proactive [facility] Encounters ([facility]FREDO) health maintenance testing (CRS, Breast Ca, Diabetic Eye Exam)   Orders entered: fit kit

## 2020-12-09 ENCOUNTER — OFFICE VISIT (OUTPATIENT)
Dept: OTOLARYNGOLOGY | Facility: CLINIC | Age: 60
End: 2020-12-09
Payer: MEDICARE

## 2020-12-09 VITALS — HEART RATE: 89 BPM | DIASTOLIC BLOOD PRESSURE: 92 MMHG | SYSTOLIC BLOOD PRESSURE: 167 MMHG

## 2020-12-09 DIAGNOSIS — R09.81 NASAL CONGESTION: ICD-10-CM

## 2020-12-09 DIAGNOSIS — J01.90 ACUTE RHINOSINUSITIS: Primary | ICD-10-CM

## 2020-12-09 DIAGNOSIS — K21.9 GASTROESOPHAGEAL REFLUX DISEASE, UNSPECIFIED WHETHER ESOPHAGITIS PRESENT: ICD-10-CM

## 2020-12-09 DIAGNOSIS — R05.3 CHRONIC COUGH: ICD-10-CM

## 2020-12-09 DIAGNOSIS — K21.9 LARYNGOPHARYNGEAL REFLUX (LPR): ICD-10-CM

## 2020-12-09 DIAGNOSIS — R44.2 OLFACTORY HALLUCINATIONS: ICD-10-CM

## 2020-12-09 PROCEDURE — 1125F AMNT PAIN NOTED PAIN PRSNT: CPT | Mod: S$GLB,,, | Performed by: OTOLARYNGOLOGY

## 2020-12-09 PROCEDURE — 99204 PR OFFICE/OUTPT VISIT, NEW, LEVL IV, 45-59 MIN: ICD-10-PCS | Mod: S$GLB,,, | Performed by: OTOLARYNGOLOGY

## 2020-12-09 PROCEDURE — 3077F SYST BP >= 140 MM HG: CPT | Mod: CPTII,S$GLB,, | Performed by: OTOLARYNGOLOGY

## 2020-12-09 PROCEDURE — 3080F DIAST BP >= 90 MM HG: CPT | Mod: CPTII,S$GLB,, | Performed by: OTOLARYNGOLOGY

## 2020-12-09 PROCEDURE — 1125F PR PAIN SEVERITY QUANTIFIED, PAIN PRESENT: ICD-10-PCS | Mod: S$GLB,,, | Performed by: OTOLARYNGOLOGY

## 2020-12-09 PROCEDURE — 3077F PR MOST RECENT SYSTOLIC BLOOD PRESSURE >= 140 MM HG: ICD-10-PCS | Mod: CPTII,S$GLB,, | Performed by: OTOLARYNGOLOGY

## 2020-12-09 PROCEDURE — 99999 PR PBB SHADOW E&M-EST. PATIENT-LVL III: ICD-10-PCS | Mod: PBBFAC,,, | Performed by: OTOLARYNGOLOGY

## 2020-12-09 PROCEDURE — 99204 OFFICE O/P NEW MOD 45 MIN: CPT | Mod: S$GLB,,, | Performed by: OTOLARYNGOLOGY

## 2020-12-09 PROCEDURE — 99999 PR PBB SHADOW E&M-EST. PATIENT-LVL III: CPT | Mod: PBBFAC,,, | Performed by: OTOLARYNGOLOGY

## 2020-12-09 PROCEDURE — 3080F PR MOST RECENT DIASTOLIC BLOOD PRESSURE >= 90 MM HG: ICD-10-PCS | Mod: CPTII,S$GLB,, | Performed by: OTOLARYNGOLOGY

## 2020-12-09 RX ORDER — OMEPRAZOLE 40 MG/1
40 CAPSULE, DELAYED RELEASE ORAL DAILY
Qty: 30 CAPSULE | Refills: 2 | Status: SHIPPED | OUTPATIENT
Start: 2020-12-09 | End: 2022-09-07

## 2020-12-09 RX ORDER — CEFDINIR 300 MG/1
300 CAPSULE ORAL 2 TIMES DAILY
Qty: 20 CAPSULE | Refills: 0 | Status: SHIPPED | OUTPATIENT
Start: 2020-12-09 | End: 2020-12-19

## 2020-12-09 RX ORDER — FLUTICASONE PROPIONATE 50 MCG
2 SPRAY, SUSPENSION (ML) NASAL DAILY
Qty: 9.9 ML | Refills: 2 | Status: SHIPPED | OUTPATIENT
Start: 2020-12-09 | End: 2021-01-08

## 2020-12-10 NOTE — PATIENT INSTRUCTIONS
Reviewed patient's symptoms, presentation and exam findings. Recommend Omnicef for treatment of probable acute rhinosinusitis. Also continue use of Flonase 2 sprays each nostril daily (instructed on proper application of medication).  Use saline nasal spray ad emeli and continue with saline sinus rinse.    Discussed effects of GERD and laryngopharyngeal reflux on throat and even sinuses. Handout on reflux precautions and vocal hygiene provided. Recommend Omeprazole 40 mg daily for at least the next 4-6 weeks. Monitor for symptom improvement and then will consider titrating down to off on Omeprazole.    Reassured that ear is not blocked w/ wax. Stop qtip usage. Good ear hygiene recommended.    Follow up with nose, smell and sinus progress by message in about 3 weeks. If no improvement, imaging may be considered including CT Sinus and Chest xray if cough is not improving. Follow up in clinic in about 6 weeks if reflux, throat and voice symptoms persist for flexible laryngoscopy in clinic (with pre-procedure covid 19 testing). Clinic # [id number]      Tips to Control Acid Reflux    To control acid reflux, youll need to make some basic diet and lifestyle changes. The simple steps outlined below may be all youll need to ease discomfort.  Watch what you eat  · Avoid fatty foods and spicy foods.  · Eat fewer acidic foods, such as citrus and tomato-based foods. These can increase symptoms.  · Limit drinking alcohol, caffeine, and fizzy beverages. All increase acid reflux.  · Try limiting chocolate, peppermint, and spearmint. These can worsen acid reflux in some people.  Watch when you eat  · Avoid lying down for 3 hours after eating.  · Do not snack before going to bed.  Raise your head  Raising your head and upper body by 4 to 6 inches helps limit reflux when youre lying down. Put blocks under the head of your bed frame to raise it.  Other changes  · Lose weight, if you need to  · Dont exercise near bedtime  · Avoid  tight-fitting clothes  · Limit aspirin and ibuprofen  · Stop smoking   Date Last Reviewed: 7/1/2016 © 2000-2017 [employer]. [address] [place] [id number]. All rights reserved. This information is not intended as a substitute for professional medical care. Always follow your healthcare professional's instructions.        How Acid Reflux Affects Your Throat    Do you have to clear your throat or cough often? Are you hoarse? Do you have trouble swallowing? If you have these or other throat symptoms, you may have acid reflux. This occurs when stomach acid flows back up and irritates your throat.  Why you have throat symptoms  There are muscles (esophageal sphincters) at both ends of the tube that carries food to your stomach (the esophagus). These muscles relax to let food pass. Then they tighten to keep stomach acid down. When the lower esophageal sphincter (LES) doesnt tighten enough, acid can flow back (reflux) from your stomach into your esophagus. This may cause heartburn. In some cases the upper esophageal sphincter (UES) also doesnt work well. Then acid can travel higher and enter your throat (pharynx). In many cases, this causes throat symptoms.  Common throat symptoms  · Need to clear your throat often  · Feeling like youre choking  · Long-term (chronic) cough  · Hoarseness  · Trouble swallowing  · Feel like you have a lump in your throat  · Sour or acid taste  · Sore throat that keeps coming back   Date Last Reviewed: 7/1/2016  © 6689-3587 [employer]. [address] [place] [id number]. All rights reserved. This information is not intended as a substitute for professional medical care. Always follow your healthcare professional's instructions.

## 2020-12-10 NOTE — PROGRESS NOTES
60-year-old female who presents my recommendation of another provider for evaluation of olfactory hallucinations for at least the past 6 months.  She reports noticing a sensation of smoke when there is no smoke around.  She has noticed this at home and at many other locations.  Sometimes it will happen multiple times a day; other times it will be intermittently every few days.  She denies any head trauma.  She has felt congestion in her nose and that it has been difficult to breathe for quite a few months.  Pharmacist recommended that she takes cetirizine.  She did not notice any improvement.  She has also tried Flonase for at least a month or 2.  For the past month or so she has tried a saline sinus rinse every morning and evening.  She does intermittently get out some yellow mucus.  She has not had fever.  Some days that nasal congestion is worse others.  Sometimes sinus pressure is present.  She has not had a lot of itching or sneezing.  She has felt some left ear discomfort and pressure as if the ear is blocked.  She tried to use some Debrox but did not get any thing out.  She does use Q-tips.  She has not had any drainage from the ear.  She denies any nose sinus or ear surgery. She also has had a cough for months. It is usually nonproductive although occasionally gets out a small amount of mucus. Has been told she has GERD. Has tried omeprazole 20 mg daily for 2 weeks - no throat improvement. She feels something in her throat at times. 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  Has not had a chest xray. No PCR covid test but has had antibody screening a couple times and is negative.    PMHx, PSHx, Meds, Allergies, SocHx, FamHx reviewed in EPIC    Review of Systems     Constitutional: Positive for fatigue.  Negative for fever.      HENT: Positive for ear pain, postnasal drip, sinus infection, sinus pressure and voice change.  Negative for ear discharge, hearing loss, runny nose, stuffy nose and trouble swallowing.       Eyes:  Negative for change in eyesight, eye drainage, eye itching and photophobia.     Respiratory:  Positive for cough. Negative for shortness of breath and wheezing.      Cardiovascular:  Negative for chest pain, foot swelling, irregular heartbeat and swollen veins.     Gastrointestinal:  Positive for abdominal pain and acid reflux. Negative for heartburn.     Genitourinary: Negative for difficulty urinating, sexual problems and frequent urination.     Musc: Positive for back pain. Negative for aching joints.     Skin: Negative for rash.     Allergy: Negative for food allergies and seasonal allergies.     Endocrine: Negative for cold intolerance and heat intolerance.      Neurological: Positive for headaches and light-headedness. Negative for dizziness.      Hematologic: Negative for bruises/bleeds easily and swollen glands.      Psychiatric: Negative for decreased concentration, depression, nervous/anxious and sleep disturbance.            PE: BP (!) 167/92   Pulse 89    Gen: female, well nourished, well developed, NAD, cooperative, good historian  Ears:  EAC minimal flakes of cerumen w/ mild skin thinning L > R (Right pin point anteriorly) & TM translucent with normal bony landmarks bilaterally  Nose: external nose wnl, nasal septum near midline anteriorly, inferior turbinates with edema, no visible purulence or polyps, edema of middle turb  OC/OP:  MMM, tongue protrudes midline, palate raises symmetrically, tonsils small symmetric  Mirror laryngoscopy - gag - can only see posterior arytenoid w/ mild edema/ erythema  Neck: supple, no TTP, no LAD or masses  Face:  Mild TTP cheeks & frontal, no erythema or flushing  Respiratory: Breathing comfortably without retractions, no wheezing or rhonchi or crackles  Skin: facial skin intact without visible lesions or flushing  Lymph: no neck lympadenopathy  Neuro:  facial movement symmetric, speech fluid, gait stable, tongue protrudes midline    Impression:   1. Acute  rhinosinusitis  fluticasone propionate (FLONASE) 50 mcg/actuation nasal spray    cefdinir (OMNICEF) 300 MG capsule   2. Nasal congestion  fluticasone propionate (FLONASE) 50 mcg/actuation nasal spray   3. Gastroesophageal reflux disease, unspecified whether esophagitis present  omeprazole (PRILOSEC) 40 MG capsule   4. Laryngopharyngeal reflux (LPR)  omeprazole (PRILOSEC) 40 MG capsule   5. Chronic cough     6. Olfactory hallucinations         Discussion and Plan:       Reviewed patient's symptoms, presentation and exam findings. Recommend Omnicef for treatment of probable acute rhinosinusitis. Also continue use of Flonase 2 sprays each nostril daily (instructed on proper application of medication).  Use saline nasal spray ad emeli and continue with saline sinus rinse.    Discussed effects of GERD and laryngopharyngeal reflux on throat and even sinuses. Handout on reflux precautions and vocal hygiene provided. Recommend Omeprazole 40 mg daily for at least the next 4-6 weeks. Monitor for symptom improvement and then will consider titrating down to off on Omeprazole.    Reassured that ear is not blocked w/ wax. Stop qtip usage. Good ear hygiene recommended.    Follow up with nose, phatom smell, and sinus progress by message in about 3 weeks. If not improvement imaging may be considered. Follow up in clinic in about 6 weeks if reflux, throat and voice symptoms persist for flexible laryngoscopy in clinic (with pre-procedure covid 19 testing). Clinic # [id number]      Parts or all of this note were created by voice recognition software; typographical errors in translating may be present.

## 2020-12-28 RX ORDER — TRIAMTERENE AND HYDROCHLOROTHIAZIDE 37.5; 25 MG/1; MG/1
CAPSULE ORAL
Qty: 90 CAPSULE | Refills: 3 | Status: SHIPPED | OUTPATIENT
Start: 2020-12-28 | End: 2021-08-24 | Stop reason: SDUPTHER

## 2021-01-04 ENCOUNTER — PATIENT MESSAGE (OUTPATIENT)
Dept: ADMINISTRATIVE | Facility: HOSPITAL | Age: 61
End: 2021-01-04

## 2021-02-18 RX ORDER — LOSARTAN POTASSIUM 50 MG/1
TABLET ORAL
Qty: 90 TABLET | Refills: 3 | Status: SHIPPED | OUTPATIENT
Start: 2021-02-18 | End: 2022-03-30

## 2021-03-02 ENCOUNTER — OFFICE VISIT (OUTPATIENT)
Dept: RHEUMATOLOGY | Facility: CLINIC | Age: 61
End: 2021-03-02
Payer: MEDICARE

## 2021-03-02 VITALS
HEIGHT: 64 IN | DIASTOLIC BLOOD PRESSURE: 80 MMHG | HEART RATE: 82 BPM | BODY MASS INDEX: 29.85 KG/M2 | WEIGHT: 174.88 LBS | OXYGEN SATURATION: 98 % | TEMPERATURE: 97 F | SYSTOLIC BLOOD PRESSURE: 157 MMHG

## 2021-03-02 DIAGNOSIS — M19.041 PRIMARY OSTEOARTHRITIS OF BOTH HANDS: ICD-10-CM

## 2021-03-02 DIAGNOSIS — G89.29 CHRONIC BILATERAL LOW BACK PAIN WITHOUT SCIATICA: ICD-10-CM

## 2021-03-02 DIAGNOSIS — M54.50 CHRONIC BILATERAL LOW BACK PAIN WITHOUT SCIATICA: ICD-10-CM

## 2021-03-02 DIAGNOSIS — M65.4 TENOSYNOVITIS, DE QUERVAIN: Primary | ICD-10-CM

## 2021-03-02 DIAGNOSIS — M19.042 PRIMARY OSTEOARTHRITIS OF BOTH HANDS: ICD-10-CM

## 2021-03-02 DIAGNOSIS — M81.8 OTHER OSTEOPOROSIS WITHOUT CURRENT PATHOLOGICAL FRACTURE: ICD-10-CM

## 2021-03-02 PROCEDURE — 99214 PR OFFICE/OUTPT VISIT, EST, LEVL IV, 30-39 MIN: ICD-10-PCS | Mod: S$GLB,,, | Performed by: INTERNAL MEDICINE

## 2021-03-02 PROCEDURE — 3079F DIAST BP 80-89 MM HG: CPT | Mod: CPTII,S$GLB,, | Performed by: INTERNAL MEDICINE

## 2021-03-02 PROCEDURE — 99999 PR PBB SHADOW E&M-EST. PATIENT-LVL IV: CPT | Mod: PBBFAC,,, | Performed by: INTERNAL MEDICINE

## 2021-03-02 PROCEDURE — 3077F SYST BP >= 140 MM HG: CPT | Mod: CPTII,S$GLB,, | Performed by: INTERNAL MEDICINE

## 2021-03-02 PROCEDURE — 3077F PR MOST RECENT SYSTOLIC BLOOD PRESSURE >= 140 MM HG: ICD-10-PCS | Mod: CPTII,S$GLB,, | Performed by: INTERNAL MEDICINE

## 2021-03-02 PROCEDURE — 99214 OFFICE O/P EST MOD 30 MIN: CPT | Mod: S$GLB,,, | Performed by: INTERNAL MEDICINE

## 2021-03-02 PROCEDURE — 3008F PR BODY MASS INDEX (BMI) DOCUMENTED: ICD-10-PCS | Mod: CPTII,S$GLB,, | Performed by: INTERNAL MEDICINE

## 2021-03-02 PROCEDURE — 3079F PR MOST RECENT DIASTOLIC BLOOD PRESSURE 80-89 MM HG: ICD-10-PCS | Mod: CPTII,S$GLB,, | Performed by: INTERNAL MEDICINE

## 2021-03-02 PROCEDURE — 3008F BODY MASS INDEX DOCD: CPT | Mod: CPTII,S$GLB,, | Performed by: INTERNAL MEDICINE

## 2021-03-02 PROCEDURE — 99999 PR PBB SHADOW E&M-EST. PATIENT-LVL IV: ICD-10-PCS | Mod: PBBFAC,,, | Performed by: INTERNAL MEDICINE

## 2021-03-03 ENCOUNTER — TELEPHONE (OUTPATIENT)
Dept: ADMINISTRATIVE | Facility: OTHER | Age: 61
End: 2021-03-03

## 2021-03-18 ENCOUNTER — TELEPHONE (OUTPATIENT)
Dept: ORTHOPEDICS | Facility: CLINIC | Age: 61
End: 2021-03-18

## 2021-03-22 ENCOUNTER — PATIENT OUTREACH (OUTPATIENT)
Dept: ADMINISTRATIVE | Facility: OTHER | Age: 61
End: 2021-03-22

## 2021-03-23 ENCOUNTER — OFFICE VISIT (OUTPATIENT)
Dept: ORTHOPEDICS | Facility: CLINIC | Age: 61
End: 2021-03-23
Payer: MEDICARE

## 2021-03-23 VITALS
DIASTOLIC BLOOD PRESSURE: 89 MMHG | WEIGHT: 174 LBS | HEART RATE: 87 BPM | HEIGHT: 64 IN | SYSTOLIC BLOOD PRESSURE: 158 MMHG | BODY MASS INDEX: 29.71 KG/M2

## 2021-03-23 DIAGNOSIS — G56.23 ULNAR NEUROPATHY OF BOTH UPPER EXTREMITIES: ICD-10-CM

## 2021-03-23 DIAGNOSIS — M19.049 CMC ARTHRITIS: Primary | ICD-10-CM

## 2021-03-23 DIAGNOSIS — M65.331 TRIGGER FINGER, RIGHT MIDDLE FINGER: ICD-10-CM

## 2021-03-23 PROCEDURE — 3008F PR BODY MASS INDEX (BMI) DOCUMENTED: ICD-10-PCS | Mod: CPTII,S$GLB,, | Performed by: ORTHOPAEDIC SURGERY

## 2021-03-23 PROCEDURE — 1125F AMNT PAIN NOTED PAIN PRSNT: CPT | Mod: S$GLB,,, | Performed by: ORTHOPAEDIC SURGERY

## 2021-03-23 PROCEDURE — 20600 DRAIN/INJ JOINT/BURSA W/O US: CPT | Mod: LT,S$GLB,, | Performed by: ORTHOPAEDIC SURGERY

## 2021-03-23 PROCEDURE — 3008F BODY MASS INDEX DOCD: CPT | Mod: CPTII,S$GLB,, | Performed by: ORTHOPAEDIC SURGERY

## 2021-03-23 PROCEDURE — 99204 PR OFFICE/OUTPT VISIT, NEW, LEVL IV, 45-59 MIN: ICD-10-PCS | Mod: 25,S$GLB,, | Performed by: ORTHOPAEDIC SURGERY

## 2021-03-23 PROCEDURE — 3079F DIAST BP 80-89 MM HG: CPT | Mod: CPTII,S$GLB,, | Performed by: ORTHOPAEDIC SURGERY

## 2021-03-23 PROCEDURE — 1125F PR PAIN SEVERITY QUANTIFIED, PAIN PRESENT: ICD-10-PCS | Mod: S$GLB,,, | Performed by: ORTHOPAEDIC SURGERY

## 2021-03-23 PROCEDURE — 99999 PR PBB SHADOW E&M-EST. PATIENT-LVL IV: ICD-10-PCS | Mod: PBBFAC,,, | Performed by: ORTHOPAEDIC SURGERY

## 2021-03-23 PROCEDURE — 99999 PR PBB SHADOW E&M-EST. PATIENT-LVL IV: CPT | Mod: PBBFAC,,, | Performed by: ORTHOPAEDIC SURGERY

## 2021-03-23 PROCEDURE — 3077F PR MOST RECENT SYSTOLIC BLOOD PRESSURE >= 140 MM HG: ICD-10-PCS | Mod: CPTII,S$GLB,, | Performed by: ORTHOPAEDIC SURGERY

## 2021-03-23 PROCEDURE — 20600 SMALL JOINT ASPIRATION/INJECTION: L THUMB CMC: ICD-10-PCS | Mod: LT,S$GLB,, | Performed by: ORTHOPAEDIC SURGERY

## 2021-03-23 PROCEDURE — 3077F SYST BP >= 140 MM HG: CPT | Mod: CPTII,S$GLB,, | Performed by: ORTHOPAEDIC SURGERY

## 2021-03-23 PROCEDURE — 99204 OFFICE O/P NEW MOD 45 MIN: CPT | Mod: 25,S$GLB,, | Performed by: ORTHOPAEDIC SURGERY

## 2021-03-23 PROCEDURE — 3079F PR MOST RECENT DIASTOLIC BLOOD PRESSURE 80-89 MM HG: ICD-10-PCS | Mod: CPTII,S$GLB,, | Performed by: ORTHOPAEDIC SURGERY

## 2021-03-23 RX ADMIN — DEXAMETHASONE SODIUM PHOSPHATE 4 MG: 4 INJECTION, SOLUTION INTRA-ARTICULAR; INTRALESIONAL; INTRAMUSCULAR; INTRAVENOUS; SOFT TISSUE at 02:03

## 2021-03-24 RX ORDER — DEXAMETHASONE SODIUM PHOSPHATE 4 MG/ML
4 INJECTION, SOLUTION INTRA-ARTICULAR; INTRALESIONAL; INTRAMUSCULAR; INTRAVENOUS; SOFT TISSUE
Status: DISCONTINUED | OUTPATIENT
Start: 2021-03-23 | End: 2021-03-24 | Stop reason: HOSPADM

## 2021-04-05 ENCOUNTER — PATIENT MESSAGE (OUTPATIENT)
Dept: ADMINISTRATIVE | Facility: HOSPITAL | Age: 61
End: 2021-04-05

## 2021-04-27 ENCOUNTER — PATIENT OUTREACH (OUTPATIENT)
Dept: ADMINISTRATIVE | Facility: OTHER | Age: 61
End: 2021-04-27

## 2021-04-27 ENCOUNTER — PROCEDURE VISIT (OUTPATIENT)
Dept: NEUROLOGY | Facility: CLINIC | Age: 61
End: 2021-04-27
Payer: MEDICARE

## 2021-04-27 DIAGNOSIS — G56.23 ULNAR NEUROPATHY OF BOTH UPPER EXTREMITIES: ICD-10-CM

## 2021-04-27 PROCEDURE — 95913 PR NERVE CONDUCTION STUDY; 13 OR MORE STUDIES: ICD-10-PCS | Mod: S$GLB,,, | Performed by: PSYCHIATRY & NEUROLOGY

## 2021-04-27 PROCEDURE — 95886 PR EMG COMPLETE, W/ NERVE CONDUCTION STUDIES, 5+ MUSCLES: ICD-10-PCS | Mod: S$GLB,,, | Performed by: PSYCHIATRY & NEUROLOGY

## 2021-04-27 PROCEDURE — 95913 NRV CNDJ TEST 13/> STUDIES: CPT | Mod: S$GLB,,, | Performed by: PSYCHIATRY & NEUROLOGY

## 2021-04-27 PROCEDURE — 95886 MUSC TEST DONE W/N TEST COMP: CPT | Mod: S$GLB,,, | Performed by: PSYCHIATRY & NEUROLOGY

## 2021-04-28 ENCOUNTER — TELEPHONE (OUTPATIENT)
Dept: ORTHOPEDICS | Facility: CLINIC | Age: 61
End: 2021-04-28

## 2021-04-29 ENCOUNTER — OFFICE VISIT (OUTPATIENT)
Dept: ORTHOPEDICS | Facility: CLINIC | Age: 61
End: 2021-04-29
Payer: MEDICARE

## 2021-04-29 VITALS — DIASTOLIC BLOOD PRESSURE: 85 MMHG | HEART RATE: 68 BPM | SYSTOLIC BLOOD PRESSURE: 164 MMHG

## 2021-04-29 DIAGNOSIS — M65.331 TRIGGER FINGER, RIGHT MIDDLE FINGER: Primary | ICD-10-CM

## 2021-04-29 DIAGNOSIS — M19.049 CMC ARTHRITIS: ICD-10-CM

## 2021-04-29 DIAGNOSIS — M54.12 CERVICAL RADICULOPATHY AT C8: ICD-10-CM

## 2021-04-29 PROCEDURE — 99213 OFFICE O/P EST LOW 20 MIN: CPT | Mod: S$GLB,,, | Performed by: ORTHOPAEDIC SURGERY

## 2021-04-29 PROCEDURE — 1125F PR PAIN SEVERITY QUANTIFIED, PAIN PRESENT: ICD-10-PCS | Mod: S$GLB,,, | Performed by: ORTHOPAEDIC SURGERY

## 2021-04-29 PROCEDURE — 99999 PR PBB SHADOW E&M-EST. PATIENT-LVL III: ICD-10-PCS | Mod: PBBFAC,,, | Performed by: ORTHOPAEDIC SURGERY

## 2021-04-29 PROCEDURE — 99999 PR PBB SHADOW E&M-EST. PATIENT-LVL III: CPT | Mod: PBBFAC,,, | Performed by: ORTHOPAEDIC SURGERY

## 2021-04-29 PROCEDURE — 99213 PR OFFICE/OUTPT VISIT, EST, LEVL III, 20-29 MIN: ICD-10-PCS | Mod: S$GLB,,, | Performed by: ORTHOPAEDIC SURGERY

## 2021-04-29 PROCEDURE — 1125F AMNT PAIN NOTED PAIN PRSNT: CPT | Mod: S$GLB,,, | Performed by: ORTHOPAEDIC SURGERY

## 2021-04-30 ENCOUNTER — TELEPHONE (OUTPATIENT)
Dept: ORTHOPEDICS | Facility: CLINIC | Age: 61
End: 2021-04-30

## 2021-05-07 NOTE — TELEPHONE ENCOUNTER
----- Message from Autumn Henning sent at 4/19/2017 11:23 AM CDT -----  Contact: pt [phone]  Pt would like a call from the nurse she said she sent a message through the portal on Monday and no one has called her,pt is asking for a call today,please advise   Patient is requesting a status update regarding her medication refill request.

## 2021-07-06 ENCOUNTER — PATIENT MESSAGE (OUTPATIENT)
Dept: ADMINISTRATIVE | Facility: HOSPITAL | Age: 61
End: 2021-07-06

## 2021-07-24 RX ORDER — ATORVASTATIN CALCIUM 20 MG/1
TABLET, FILM COATED ORAL
Qty: 90 TABLET | Refills: 3 | Status: SHIPPED | OUTPATIENT
Start: 2021-07-24 | End: 2022-10-30 | Stop reason: ALTCHOICE

## 2021-08-17 ENCOUNTER — TELEPHONE (OUTPATIENT)
Dept: INTERNAL MEDICINE | Facility: CLINIC | Age: 61
End: 2021-08-17

## 2021-08-17 DIAGNOSIS — I10 ESSENTIAL HYPERTENSION: ICD-10-CM

## 2021-08-17 DIAGNOSIS — E78.5 HYPERLIPIDEMIA, UNSPECIFIED HYPERLIPIDEMIA TYPE: ICD-10-CM

## 2021-08-17 DIAGNOSIS — R73.03 PRE-DIABETES: Primary | ICD-10-CM

## 2021-08-17 DIAGNOSIS — M81.8 OTHER OSTEOPOROSIS WITHOUT CURRENT PATHOLOGICAL FRACTURE: ICD-10-CM

## 2021-08-20 ENCOUNTER — LAB VISIT (OUTPATIENT)
Dept: LAB | Facility: HOSPITAL | Age: 61
End: 2021-08-20
Attending: INTERNAL MEDICINE
Payer: MEDICARE

## 2021-08-20 DIAGNOSIS — R73.03 PRE-DIABETES: ICD-10-CM

## 2021-08-20 DIAGNOSIS — E78.5 HYPERLIPIDEMIA, UNSPECIFIED HYPERLIPIDEMIA TYPE: ICD-10-CM

## 2021-08-20 DIAGNOSIS — M81.8 OTHER OSTEOPOROSIS WITHOUT CURRENT PATHOLOGICAL FRACTURE: ICD-10-CM

## 2021-08-20 DIAGNOSIS — I10 ESSENTIAL HYPERTENSION: ICD-10-CM

## 2021-08-20 LAB
25(OH)D3+25(OH)D2 SERPL-MCNC: 68 NG/ML (ref 30–96)
ALBUMIN SERPL BCP-MCNC: 4.4 G/DL (ref 3.5–5.2)
ALP SERPL-CCNC: 81 U/L (ref 55–135)
ALT SERPL W/O P-5'-P-CCNC: 40 U/L (ref 10–44)
ANION GAP SERPL CALC-SCNC: 11 MMOL/L (ref 8–16)
AST SERPL-CCNC: 28 U/L (ref 10–40)
BASOPHILS # BLD AUTO: 0.05 K/UL (ref 0–0.2)
BASOPHILS NFR BLD: 0.7 % (ref 0–1.9)
BILIRUB SERPL-MCNC: 0.5 MG/DL (ref 0.1–1)
BUN SERPL-MCNC: 14 MG/DL (ref 8–23)
CALCIUM SERPL-MCNC: 10.4 MG/DL (ref 8.7–10.5)
CHLORIDE SERPL-SCNC: 103 MMOL/L (ref 95–110)
CHOLEST SERPL-MCNC: 202 MG/DL (ref 120–199)
CHOLEST/HDLC SERPL: 3.3 {RATIO} (ref 2–5)
CO2 SERPL-SCNC: 27 MMOL/L (ref 23–29)
CREAT SERPL-MCNC: 0.9 MG/DL (ref 0.5–1.4)
DIFFERENTIAL METHOD: ABNORMAL
EOSINOPHIL # BLD AUTO: 0.2 K/UL (ref 0–0.5)
EOSINOPHIL NFR BLD: 2.2 % (ref 0–8)
ERYTHROCYTE [DISTWIDTH] IN BLOOD BY AUTOMATED COUNT: 13.2 % (ref 11.5–14.5)
EST. GFR  (AFRICAN AMERICAN): >60 ML/MIN/1.73 M^2
EST. GFR  (NON AFRICAN AMERICAN): >60 ML/MIN/1.73 M^2
ESTIMATED AVG GLUCOSE: 114 MG/DL (ref 68–131)
GLUCOSE SERPL-MCNC: 119 MG/DL (ref 70–110)
HBA1C MFR BLD: 5.6 % (ref 4–5.6)
HCT VFR BLD AUTO: 45.6 % (ref 37–48.5)
HDLC SERPL-MCNC: 61 MG/DL (ref 40–75)
HDLC SERPL: 30.2 % (ref 20–50)
HGB BLD-MCNC: 14.5 G/DL (ref 12–16)
IMM GRANULOCYTES # BLD AUTO: 0.04 K/UL (ref 0–0.04)
IMM GRANULOCYTES NFR BLD AUTO: 0.5 % (ref 0–0.5)
LDLC SERPL CALC-MCNC: 88.8 MG/DL (ref 63–159)
LYMPHOCYTES # BLD AUTO: 2.6 K/UL (ref 1–4.8)
LYMPHOCYTES NFR BLD: 36.1 % (ref 18–48)
MCH RBC QN AUTO: 27.6 PG (ref 27–31)
MCHC RBC AUTO-ENTMCNC: 31.8 G/DL (ref 32–36)
MCV RBC AUTO: 87 FL (ref 82–98)
MONOCYTES # BLD AUTO: 0.5 K/UL (ref 0.3–1)
MONOCYTES NFR BLD: 7.3 % (ref 4–15)
NEUTROPHILS # BLD AUTO: 3.9 K/UL (ref 1.8–7.7)
NEUTROPHILS NFR BLD: 53.2 % (ref 38–73)
NONHDLC SERPL-MCNC: 141 MG/DL
NRBC BLD-RTO: 0 /100 WBC
PLATELET # BLD AUTO: 249 K/UL (ref 150–450)
PMV BLD AUTO: 10.7 FL (ref 9.2–12.9)
POTASSIUM SERPL-SCNC: 4 MMOL/L (ref 3.5–5.1)
PROT SERPL-MCNC: 7.6 G/DL (ref 6–8.4)
RBC # BLD AUTO: 5.26 M/UL (ref 4–5.4)
SODIUM SERPL-SCNC: 141 MMOL/L (ref 136–145)
TRIGL SERPL-MCNC: 261 MG/DL (ref 30–150)
TSH SERPL DL<=0.005 MIU/L-ACNC: 1.47 UIU/ML (ref 0.4–4)
WBC # BLD AUTO: 7.31 K/UL (ref 3.9–12.7)

## 2021-08-20 PROCEDURE — 80061 LIPID PANEL: CPT | Performed by: INTERNAL MEDICINE

## 2021-08-20 PROCEDURE — 84443 ASSAY THYROID STIM HORMONE: CPT | Performed by: INTERNAL MEDICINE

## 2021-08-20 PROCEDURE — 82306 VITAMIN D 25 HYDROXY: CPT | Performed by: INTERNAL MEDICINE

## 2021-08-20 PROCEDURE — 80053 COMPREHEN METABOLIC PANEL: CPT | Performed by: INTERNAL MEDICINE

## 2021-08-20 PROCEDURE — 83036 HEMOGLOBIN GLYCOSYLATED A1C: CPT | Performed by: INTERNAL MEDICINE

## 2021-08-20 PROCEDURE — 36415 COLL VENOUS BLD VENIPUNCTURE: CPT | Mod: PO | Performed by: INTERNAL MEDICINE

## 2021-08-20 PROCEDURE — 85025 COMPLETE CBC W/AUTO DIFF WBC: CPT | Performed by: INTERNAL MEDICINE

## 2021-08-23 ENCOUNTER — TELEPHONE (OUTPATIENT)
Dept: INTERNAL MEDICINE | Facility: CLINIC | Age: 61
End: 2021-08-23

## 2021-08-24 ENCOUNTER — OFFICE VISIT (OUTPATIENT)
Dept: INTERNAL MEDICINE | Facility: CLINIC | Age: 61
End: 2021-08-24
Payer: MEDICARE

## 2021-08-24 VITALS
OXYGEN SATURATION: 97 % | DIASTOLIC BLOOD PRESSURE: 70 MMHG | BODY MASS INDEX: 28.53 KG/M2 | SYSTOLIC BLOOD PRESSURE: 125 MMHG | HEART RATE: 100 BPM | WEIGHT: 167.13 LBS | TEMPERATURE: 98 F | HEIGHT: 64 IN

## 2021-08-24 DIAGNOSIS — I10 ESSENTIAL HYPERTENSION: ICD-10-CM

## 2021-08-24 DIAGNOSIS — I70.0 AORTIC ATHEROSCLEROSIS: ICD-10-CM

## 2021-08-24 DIAGNOSIS — Z00.00 ANNUAL PHYSICAL EXAM: Primary | ICD-10-CM

## 2021-08-24 DIAGNOSIS — M81.8 OTHER OSTEOPOROSIS WITHOUT CURRENT PATHOLOGICAL FRACTURE: ICD-10-CM

## 2021-08-24 DIAGNOSIS — K21.9 GASTROESOPHAGEAL REFLUX DISEASE WITHOUT ESOPHAGITIS: ICD-10-CM

## 2021-08-24 DIAGNOSIS — E78.5 HYPERLIPIDEMIA, UNSPECIFIED HYPERLIPIDEMIA TYPE: ICD-10-CM

## 2021-08-24 PROCEDURE — 99214 OFFICE O/P EST MOD 30 MIN: CPT | Mod: GC,S$GLB,,

## 2021-08-24 PROCEDURE — 99214 PR OFFICE/OUTPT VISIT, EST, LEVL IV, 30-39 MIN: ICD-10-PCS | Mod: GC,S$GLB,,

## 2021-08-24 PROCEDURE — 99999 PR PBB SHADOW E&M-EST. PATIENT-LVL IV: ICD-10-PCS | Mod: PBBFAC,GC,,

## 2021-08-24 PROCEDURE — 99999 PR PBB SHADOW E&M-EST. PATIENT-LVL IV: CPT | Mod: PBBFAC,GC,,

## 2021-08-24 RX ORDER — FLUTICASONE PROPIONATE 50 MCG
SPRAY, SUSPENSION (ML) NASAL
COMMUNITY
Start: 2021-03-28

## 2021-08-24 RX ORDER — TRIAMTERENE AND HYDROCHLOROTHIAZIDE 37.5; 25 MG/1; MG/1
1 CAPSULE ORAL DAILY
Qty: 135 CAPSULE | Refills: 3
Start: 2021-08-24 | End: 2022-01-03

## 2021-10-04 ENCOUNTER — PATIENT MESSAGE (OUTPATIENT)
Dept: ADMINISTRATIVE | Facility: HOSPITAL | Age: 61
End: 2021-10-04

## 2021-12-08 ENCOUNTER — OFFICE VISIT (OUTPATIENT)
Dept: OTOLARYNGOLOGY | Facility: CLINIC | Age: 61
End: 2021-12-08
Payer: MEDICARE

## 2021-12-08 VITALS — SYSTOLIC BLOOD PRESSURE: 144 MMHG | DIASTOLIC BLOOD PRESSURE: 81 MMHG | HEART RATE: 102 BPM

## 2021-12-08 DIAGNOSIS — M95.0 NASAL VALVE COLLAPSE: ICD-10-CM

## 2021-12-08 DIAGNOSIS — K21.9 GASTROESOPHAGEAL REFLUX DISEASE, UNSPECIFIED WHETHER ESOPHAGITIS PRESENT: ICD-10-CM

## 2021-12-08 DIAGNOSIS — J34.2 NASAL SEPTAL DEVIATION: ICD-10-CM

## 2021-12-08 DIAGNOSIS — J32.9 CHRONIC RHINOSINUSITIS: Primary | ICD-10-CM

## 2021-12-08 DIAGNOSIS — J01.90 ACUTE RHINOSINUSITIS: ICD-10-CM

## 2021-12-08 DIAGNOSIS — R09.81 NASAL CONGESTION: ICD-10-CM

## 2021-12-08 DIAGNOSIS — K21.9 LARYNGOPHARYNGEAL REFLUX (LPR): ICD-10-CM

## 2021-12-08 DIAGNOSIS — R05.3 CHRONIC COUGH: ICD-10-CM

## 2021-12-08 DIAGNOSIS — R44.2 OLFACTORY HALLUCINATIONS: ICD-10-CM

## 2021-12-08 PROCEDURE — 99499 RISK ADDL DX/OHS AUDIT: ICD-10-PCS | Mod: S$GLB,,, | Performed by: OTOLARYNGOLOGY

## 2021-12-08 PROCEDURE — 3066F NEPHROPATHY DOC TX: CPT | Mod: CPTII,S$GLB,, | Performed by: OTOLARYNGOLOGY

## 2021-12-08 PROCEDURE — 99499 UNLISTED E&M SERVICE: CPT | Mod: S$GLB,,, | Performed by: OTOLARYNGOLOGY

## 2021-12-08 PROCEDURE — 3061F PR NEG MICROALBUMINURIA RESULT DOCUMENTED/REVIEW: ICD-10-PCS | Mod: CPTII,S$GLB,, | Performed by: OTOLARYNGOLOGY

## 2021-12-08 PROCEDURE — 99999 PR PBB SHADOW E&M-EST. PATIENT-LVL III: CPT | Mod: PBBFAC,,, | Performed by: OTOLARYNGOLOGY

## 2021-12-08 PROCEDURE — 3066F PR DOCUMENTATION OF TREATMENT FOR NEPHROPATHY: ICD-10-PCS | Mod: CPTII,S$GLB,, | Performed by: OTOLARYNGOLOGY

## 2021-12-08 PROCEDURE — 4010F PR ACE/ARB THEARPY RXD/TAKEN: ICD-10-PCS | Mod: CPTII,S$GLB,, | Performed by: OTOLARYNGOLOGY

## 2021-12-08 PROCEDURE — 99214 PR OFFICE/OUTPT VISIT, EST, LEVL IV, 30-39 MIN: ICD-10-PCS | Mod: S$GLB,,, | Performed by: OTOLARYNGOLOGY

## 2021-12-08 PROCEDURE — 3061F NEG MICROALBUMINURIA REV: CPT | Mod: CPTII,S$GLB,, | Performed by: OTOLARYNGOLOGY

## 2021-12-08 PROCEDURE — 99999 PR PBB SHADOW E&M-EST. PATIENT-LVL III: ICD-10-PCS | Mod: PBBFAC,,, | Performed by: OTOLARYNGOLOGY

## 2021-12-08 PROCEDURE — 99214 OFFICE O/P EST MOD 30 MIN: CPT | Mod: S$GLB,,, | Performed by: OTOLARYNGOLOGY

## 2021-12-08 PROCEDURE — 4010F ACE/ARB THERAPY RXD/TAKEN: CPT | Mod: CPTII,S$GLB,, | Performed by: OTOLARYNGOLOGY

## 2021-12-08 RX ORDER — CEFDINIR 300 MG/1
300 CAPSULE ORAL 2 TIMES DAILY
Qty: 20 CAPSULE | Refills: 0 | Status: SHIPPED | OUTPATIENT
Start: 2021-12-08 | End: 2021-12-18

## 2021-12-30 ENCOUNTER — HOSPITAL ENCOUNTER (OUTPATIENT)
Dept: RADIOLOGY | Facility: HOSPITAL | Age: 61
Discharge: HOME | End: 2021-12-30
Attending: OTOLARYNGOLOGY
Payer: MEDICARE

## 2021-12-30 DIAGNOSIS — J01.90 ACUTE RHINOSINUSITIS: ICD-10-CM

## 2021-12-30 DIAGNOSIS — J32.9 CHRONIC RHINOSINUSITIS: ICD-10-CM

## 2021-12-30 PROCEDURE — 70486 CT MEDTRONIC SINUSES WITHOUT: ICD-10-PCS | Mod: 26,,, | Performed by: RADIOLOGY

## 2021-12-30 PROCEDURE — 70486 CT MAXILLOFACIAL W/O DYE: CPT | Mod: TC

## 2021-12-30 PROCEDURE — 70486 CT MAXILLOFACIAL W/O DYE: CPT | Mod: 26,,, | Performed by: RADIOLOGY

## 2022-01-13 ENCOUNTER — PATIENT MESSAGE (OUTPATIENT)
Dept: OTOLARYNGOLOGY | Facility: CLINIC | Age: 62
End: 2022-01-13
Payer: MEDICARE

## 2022-01-14 ENCOUNTER — PATIENT MESSAGE (OUTPATIENT)
Dept: OTOLARYNGOLOGY | Facility: CLINIC | Age: 62
End: 2022-01-14
Payer: MEDICARE

## 2022-01-31 ENCOUNTER — PES CALL (OUTPATIENT)
Dept: ADMINISTRATIVE | Facility: CLINIC | Age: 62
End: 2022-01-31
Payer: MEDICARE

## 2022-02-02 ENCOUNTER — OFFICE VISIT (OUTPATIENT)
Dept: OTOLARYNGOLOGY | Facility: CLINIC | Age: 62
End: 2022-02-02
Payer: MEDICARE

## 2022-02-02 VITALS — DIASTOLIC BLOOD PRESSURE: 90 MMHG | SYSTOLIC BLOOD PRESSURE: 155 MMHG | HEART RATE: 90 BPM

## 2022-02-02 DIAGNOSIS — R44.2 OLFACTORY HALLUCINATIONS: ICD-10-CM

## 2022-02-02 DIAGNOSIS — R43.8 HYPOGEUSIA: ICD-10-CM

## 2022-02-02 DIAGNOSIS — R43.8 HYPOSMIA: ICD-10-CM

## 2022-02-02 DIAGNOSIS — J32.1 CHRONIC FRONTAL SINUSITIS: ICD-10-CM

## 2022-02-02 DIAGNOSIS — J34.2 NASAL SEPTAL DEVIATION: Primary | ICD-10-CM

## 2022-02-02 DIAGNOSIS — J32.2 CHRONIC ETHMOIDAL SINUSITIS: ICD-10-CM

## 2022-02-02 DIAGNOSIS — M95.0 NASAL VALVE COLLAPSE: ICD-10-CM

## 2022-02-02 PROCEDURE — 3080F PR MOST RECENT DIASTOLIC BLOOD PRESSURE >= 90 MM HG: ICD-10-PCS | Mod: CPTII,S$GLB,, | Performed by: OTOLARYNGOLOGY

## 2022-02-02 PROCEDURE — 3077F PR MOST RECENT SYSTOLIC BLOOD PRESSURE >= 140 MM HG: ICD-10-PCS | Mod: CPTII,S$GLB,, | Performed by: OTOLARYNGOLOGY

## 2022-02-02 PROCEDURE — 1160F RVW MEDS BY RX/DR IN RCRD: CPT | Mod: CPTII,S$GLB,, | Performed by: OTOLARYNGOLOGY

## 2022-02-02 PROCEDURE — 1160F PR REVIEW ALL MEDS BY PRESCRIBER/CLIN PHARMACIST DOCUMENTED: ICD-10-PCS | Mod: CPTII,S$GLB,, | Performed by: OTOLARYNGOLOGY

## 2022-02-02 PROCEDURE — 1159F PR MEDICATION LIST DOCUMENTED IN MEDICAL RECORD: ICD-10-PCS | Mod: CPTII,S$GLB,, | Performed by: OTOLARYNGOLOGY

## 2022-02-02 PROCEDURE — 99999 PR PBB SHADOW E&M-EST. PATIENT-LVL III: ICD-10-PCS | Mod: PBBFAC,,, | Performed by: OTOLARYNGOLOGY

## 2022-02-02 PROCEDURE — 3080F DIAST BP >= 90 MM HG: CPT | Mod: CPTII,S$GLB,, | Performed by: OTOLARYNGOLOGY

## 2022-02-02 PROCEDURE — 1159F MED LIST DOCD IN RCRD: CPT | Mod: CPTII,S$GLB,, | Performed by: OTOLARYNGOLOGY

## 2022-02-02 PROCEDURE — 99999 PR PBB SHADOW E&M-EST. PATIENT-LVL III: CPT | Mod: PBBFAC,,, | Performed by: OTOLARYNGOLOGY

## 2022-02-02 PROCEDURE — 99214 PR OFFICE/OUTPT VISIT, EST, LEVL IV, 30-39 MIN: ICD-10-PCS | Mod: S$GLB,,, | Performed by: OTOLARYNGOLOGY

## 2022-02-02 PROCEDURE — 3077F SYST BP >= 140 MM HG: CPT | Mod: CPTII,S$GLB,, | Performed by: OTOLARYNGOLOGY

## 2022-02-02 PROCEDURE — 99214 OFFICE O/P EST MOD 30 MIN: CPT | Mod: S$GLB,,, | Performed by: OTOLARYNGOLOGY

## 2022-02-02 NOTE — PATIENT INSTRUCTIONS
Continue with saline sinus rinse daily.    Use Flonase 2 sprays each nostril once a day. May increase to twice a day if nasal congestion/ pressure symptoms temporarily increase.  If nasal congestion increases - can consider budesonide saline sinus rinses in the future. Patient will contact me if needed.    Neurology referral to [facility] long term olfactory hallucinations (intermittent).   May be related to neuropathy or migraine in origin. Currently not troublesome.    Decrease in vocal range - consider appointment with Dr. Lizarraga (laryngology subspecialist).     Expectations discussed. Return for worsening pain or problems especially related to area of mild chronic sinusitis.    Reviewed nasal septal deviation and nasal valve collapse - could consider functional nasal reconstruction.    Discussed good vocal hygiene - avoiding throat clearing. Use sips of water to help move mucus.    F/u as needed.

## 2022-02-02 NOTE — PROGRESS NOTES
"62 y/o female presents to review CT of Sinus results and discuss her symptoms. Since Dec 2021 the smell of constant smoke has dissipated for now. She admits to having it intermittently for years.     CT of Head from 2016 was obtained when she was having trouble with olf hallucinations.     Using saline sinus rinse daily and Flonase at night. Feels Flonase does not work like Afrin but staying away from Afrin. No recent discolored mucus. PND. Does get sinus pressure - was having it over her cheeks in December.    Still worried about her voice. 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 Coughs in the morning - clears by midday and then returns at night. Admits to clearing her throat. We have previously discussed reflux precautions.     Smell and taste are still up and down. Some days she smells and can taste some. Other days she cannot.   No known COVID. This has been an issue for a while. Has her vaccinations.    12/8/21 - HPI  "61-year-old female who returns to clinic for evaluation of her complaints of smoke smell "olfactory hallucinations".  She reports the problem has become nearly constant.  It has been present for a few years.  Initially it was intermittent.  She was smelled smoke even though smoke is not near by.  It does not smell like tobacco smoke.  Sometimes it is so function that it will make her nauseated.  She sometimes been brings tiger balm to her nose to smell it.  She will smell that and it will drown out the smell the smoke. When it used to be intermittent she could not associate the smell problem with anything.  No hx of migraine or headache. Did hysterectomy years ago. Still has her ovaries. Not sure about her hormone levels. No nose bleeds. No noticeable change in vision. Rarely gets headaches but Tylenol helps.     She also complains of congestion in her nose.  She gets sinus pressure.  She nearly has constant pressure in her cheeks.  It has been noticeable recently.  Last year she was treated for acute " "sinusitis when seen in December.  She does not recall if the antibiotic helped.  She has Flonase at home.  She does not use it daily.  She has a problem with her nose at night when she sleeps that 1 side blocks off when she turns to the other side than the other side was blocked off.  When she awakes in the morning she has coughing.  She reports symptoms clearing her throat.  She is not to the cough up a lot of mucus.  She has not tried to swallow it.  She has not noticed any recent discolored mucus.     She feels her voice is not like it used to be.  She can no longer reach certain notes.  She has given of singing."    PMHx, PSHx, Meds, Allergies, SocHx, FamHx reviewed in EPIC    Review of Systems     Constitutional: Negative for appetite change, chills, fatigue, fever and unexpected weight loss.      HENT: Positive for postnasal drip, sinus pressure and voice change.  Negative for ear discharge, ear pain, hearing loss, runny nose, sinus infection, stuffy nose and trouble swallowing.      Eyes:  Negative for change in eyesight, eye drainage, eye itching and photophobia.     Respiratory:  Positive for cough. Negative for shortness of breath and wheezing.      Cardiovascular:  Negative for chest pain, foot swelling, irregular heartbeat and swollen veins.     Gastrointestinal:  Positive for acid reflux. Negative for abdominal pain and heartburn.     Genitourinary: Negative for difficulty urinating, sexual problems and frequent urination.     Musc: Positive for back pain and neck pain. Negative for aching joints.     Skin: Negative for rash.     Allergy: Negative for food allergies and seasonal allergies.     Endocrine: Negative for cold intolerance and heat intolerance.      Neurological: Negative for dizziness, headaches, light-headedness, seizures and tremors.      Hematologic: Negative for bruises/bleeds easily and swollen glands.      Psychiatric: Negative for decreased concentration, depression, nervous/anxious " and sleep disturbance.        PE: BP (!) 155/90   Pulse 90    Gen: female, well nourished, well developed, NAD, cooperative, good historian  ENT: no exam today - here to review CT of Sinus and discuss history, symptoms and options for treatment  Face:  no TTP, no erythema or flushing  Respiratory: Breathing comfortably without retractions  Neuro:  facial movement symmetric, speech fluid, gait stable  Psych: alert & oriented x 3, reasonable, normal affect  Voice: decent volume and strength, no stridor, not raspy    CT of sinus images and report reviewed and compared to sinuses visible on 2016 CT of the Head.  Images reviewed with patient.     Impression:   1. Nasal septal deviation     2. Nasal valve collapse     3. Olfactory hallucinations  Ambulatory referral/consult to Neurology    intermittent - not currently   4. Chronic frontal sinusitis     5. Chronic ethmoidal sinusitis     6. Hyposmia      intermittent   7. Hypogeusia      intermittent       Discussion and Plan:       Continue with saline sinus rinse daily.    Use Flonase 2 sprays each nostril once a day. May increase to twice a day if nasal congestion/ pressure symptoms temporarily increase.  If nasal congestion increases - can consider budesonide saline sinus rinses in the future. Patient will contact me if needed.    Neurology referral to [facility] long term olfactory hallucinations (intermittent).   May be related to neuropathy or migraine in origin. Currently not troublesome.    Regarding daily cough - Discussed good vocal hygiene - avoiding throat clearing. Use sips of water to help move mucus.    Decrease in vocal range - consider appointment with Dr. Lizarraga (laryngology subspecialist).     Expectations discussed. Return for worsening pain or problems especially related to area of mild chronic sinusitis.    Reviewed nasal septal deviation and nasal valve collapse - could consider functional nasal reconstruction.      F/u as needed.      Parts or all of  this note were created by voice recognition software; typographical errors in translating may be present.

## 2022-02-11 ENCOUNTER — PATIENT MESSAGE (OUTPATIENT)
Dept: OTOLARYNGOLOGY | Facility: CLINIC | Age: 62
End: 2022-02-11
Payer: MEDICARE

## 2022-03-02 ENCOUNTER — PATIENT OUTREACH (OUTPATIENT)
Dept: ADMINISTRATIVE | Facility: OTHER | Age: 62
End: 2022-03-02
Payer: MEDICARE

## 2022-03-02 DIAGNOSIS — Z12.11 ENCOUNTER FOR FIT (FECAL IMMUNOCHEMICAL TEST) SCREENING: Primary | ICD-10-CM

## 2022-03-02 DIAGNOSIS — Z12.31 ENCOUNTER FOR SCREENING MAMMOGRAM FOR MALIGNANT NEOPLASM OF BREAST: ICD-10-CM

## 2022-03-02 NOTE — PROGRESS NOTES
Health Maintenance Due   Topic Date Due    Pneumococcal Vaccines (Age 0-64) (1 of 2 - PPSV23) Never done    HIV Screening  Never done    Colorectal Cancer Screening  09/25/2019    Mammogram  05/15/2021     Updates were requested from care everywhere.  Chart was reviewed for overdue Proactive [facility] Encounters ([facility]) topics (CRS, Breast Cancer Screening, Eye exam)  Health Maintenance has been updated.  LINKS immunization registry triggered.  Immunizations were reconciled.  Order placed for FIT kit and mammogram.

## 2022-03-29 ENCOUNTER — PES CALL (OUTPATIENT)
Dept: ADMINISTRATIVE | Facility: CLINIC | Age: 62
End: 2022-03-29
Payer: MEDICARE

## 2022-03-29 ENCOUNTER — PATIENT MESSAGE (OUTPATIENT)
Dept: INTERNAL MEDICINE | Facility: CLINIC | Age: 62
End: 2022-03-29
Payer: MEDICARE

## 2022-04-29 ENCOUNTER — PATIENT OUTREACH (OUTPATIENT)
Dept: ADMINISTRATIVE | Facility: HOSPITAL | Age: 62
End: 2022-04-29
Payer: MEDICARE

## 2022-04-29 ENCOUNTER — PATIENT MESSAGE (OUTPATIENT)
Dept: ADMINISTRATIVE | Facility: HOSPITAL | Age: 62
End: 2022-04-29
Payer: MEDICARE

## 2022-05-11 ENCOUNTER — PATIENT OUTREACH (OUTPATIENT)
Dept: ADMINISTRATIVE | Facility: HOSPITAL | Age: 62
End: 2022-05-11
Payer: MEDICARE

## 2022-05-11 ENCOUNTER — PATIENT MESSAGE (OUTPATIENT)
Dept: ADMINISTRATIVE | Facility: HOSPITAL | Age: 62
End: 2022-05-11
Payer: MEDICARE

## 2022-05-30 ENCOUNTER — PES CALL (OUTPATIENT)
Dept: ADMINISTRATIVE | Facility: CLINIC | Age: 62
End: 2022-05-30
Payer: MEDICARE

## 2022-06-01 ENCOUNTER — PATIENT MESSAGE (OUTPATIENT)
Dept: ADMINISTRATIVE | Facility: HOSPITAL | Age: 62
End: 2022-06-01
Payer: MEDICARE

## 2022-06-03 ENCOUNTER — OFFICE VISIT (OUTPATIENT)
Dept: NEUROLOGY | Facility: CLINIC | Age: 62
End: 2022-06-03
Payer: MEDICARE

## 2022-06-03 VITALS
BODY MASS INDEX: 27.89 KG/M2 | SYSTOLIC BLOOD PRESSURE: 135 MMHG | WEIGHT: 163.38 LBS | DIASTOLIC BLOOD PRESSURE: 88 MMHG | HEIGHT: 64 IN | OXYGEN SATURATION: 98 % | HEART RATE: 94 BPM

## 2022-06-03 DIAGNOSIS — H81.10 BENIGN PAROXYSMAL POSITIONAL VERTIGO, UNSPECIFIED LATERALITY: Primary | ICD-10-CM

## 2022-06-03 DIAGNOSIS — R44.2 OLFACTORY HALLUCINATIONS: ICD-10-CM

## 2022-06-03 DIAGNOSIS — R42 DIZZINESS AND GIDDINESS: ICD-10-CM

## 2022-06-03 PROCEDURE — 4010F PR ACE/ARB THEARPY RXD/TAKEN: ICD-10-PCS | Mod: CPTII,S$GLB,, | Performed by: PSYCHIATRY & NEUROLOGY

## 2022-06-03 PROCEDURE — 3008F PR BODY MASS INDEX (BMI) DOCUMENTED: ICD-10-PCS | Mod: CPTII,S$GLB,, | Performed by: PSYCHIATRY & NEUROLOGY

## 2022-06-03 PROCEDURE — 3075F SYST BP GE 130 - 139MM HG: CPT | Mod: CPTII,S$GLB,, | Performed by: PSYCHIATRY & NEUROLOGY

## 2022-06-03 PROCEDURE — 1160F PR REVIEW ALL MEDS BY PRESCRIBER/CLIN PHARMACIST DOCUMENTED: ICD-10-PCS | Mod: CPTII,S$GLB,, | Performed by: PSYCHIATRY & NEUROLOGY

## 2022-06-03 PROCEDURE — 99999 PR PBB SHADOW E&M-EST. PATIENT-LVL V: ICD-10-PCS | Mod: PBBFAC,,, | Performed by: PSYCHIATRY & NEUROLOGY

## 2022-06-03 PROCEDURE — 1159F PR MEDICATION LIST DOCUMENTED IN MEDICAL RECORD: ICD-10-PCS | Mod: CPTII,S$GLB,, | Performed by: PSYCHIATRY & NEUROLOGY

## 2022-06-03 PROCEDURE — 3008F BODY MASS INDEX DOCD: CPT | Mod: CPTII,S$GLB,, | Performed by: PSYCHIATRY & NEUROLOGY

## 2022-06-03 PROCEDURE — 3075F PR MOST RECENT SYSTOLIC BLOOD PRESS GE 130-139MM HG: ICD-10-PCS | Mod: CPTII,S$GLB,, | Performed by: PSYCHIATRY & NEUROLOGY

## 2022-06-03 PROCEDURE — 4010F ACE/ARB THERAPY RXD/TAKEN: CPT | Mod: CPTII,S$GLB,, | Performed by: PSYCHIATRY & NEUROLOGY

## 2022-06-03 PROCEDURE — 1160F RVW MEDS BY RX/DR IN RCRD: CPT | Mod: CPTII,S$GLB,, | Performed by: PSYCHIATRY & NEUROLOGY

## 2022-06-03 PROCEDURE — 3079F DIAST BP 80-89 MM HG: CPT | Mod: CPTII,S$GLB,, | Performed by: PSYCHIATRY & NEUROLOGY

## 2022-06-03 PROCEDURE — 99999 PR PBB SHADOW E&M-EST. PATIENT-LVL V: CPT | Mod: PBBFAC,,, | Performed by: PSYCHIATRY & NEUROLOGY

## 2022-06-03 PROCEDURE — 3079F PR MOST RECENT DIASTOLIC BLOOD PRESSURE 80-89 MM HG: ICD-10-PCS | Mod: CPTII,S$GLB,, | Performed by: PSYCHIATRY & NEUROLOGY

## 2022-06-03 PROCEDURE — 99205 PR OFFICE/OUTPT VISIT, NEW, LEVL V, 60-74 MIN: ICD-10-PCS | Mod: S$GLB,,, | Performed by: PSYCHIATRY & NEUROLOGY

## 2022-06-03 PROCEDURE — 1159F MED LIST DOCD IN RCRD: CPT | Mod: CPTII,S$GLB,, | Performed by: PSYCHIATRY & NEUROLOGY

## 2022-06-03 PROCEDURE — 99205 OFFICE O/P NEW HI 60 MIN: CPT | Mod: S$GLB,,, | Performed by: PSYCHIATRY & NEUROLOGY

## 2022-06-03 NOTE — PROGRESS NOTES
[facility] NEUROLOGY  [facility], [place]    Date: 6/3/22  Patient Name: Ivelisse Hay   MRN: [id number]   PCP: Richelle Enriquez  Referring Provider: Cristal Molina MD    Chief Complaint: Phantosmia  Subjective:   Patient seen in consultation at the request of Cristal Molina MD for the evaluation of phantosmia. A copy of this note will be sent to the referring physician.        HPI:   Ms. Ivelisse Hay is a 61 y.o. 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  The patient shares that for years, per chart review prior to 2016, the patient has had transient aberrant odor without stimulus in the form of smoky smell.  In the past, this sensation would be present constantly for a month or more at a time before going away with full return to baseline with no deficits.  This is happened innumerable times over the years.  Over the course of the last couple of years, these episodes have gotten much more frequent.  Most recent episode happened a couple of months ago and persisted for 6-8 weeks.  Interestingly, approximately 2 months ago, the patient had significant decrease instances of smell and taste that have not returned to baseline.  She can only smell or taste very strong smells or flavored.  She denies any recent infections or COVID-19.    Denies significant head trauma in the past.  Denies any known exposures to heavy metals at home or in the workplace.  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  Denies any changes in memory or family history of neuro degenerative processes.  No history of seizures or abnormal loss of consciousness events.    Extensive lab review conducted on the day of the encounter.  Since the time of symptom onset, the patient has had extensive laboratory studies including negative rheumatoid factor, MIKAELA, Sjogren's antibodies.  Normal liver and kidney function.  Normal ESR; very mildly elevated CRP.  Normal TSH.    CT head with/without contrast  completed in 2016 specifically for these complaints which was found to be normal.  This imaging was completed when episodes were less frequent and do not involve alterations in taste.    Also describes a sense of dizziness/disequilibrium when rolling over in bed or bending over and turning her head in certain positions.  These episodes are predictable, short lasting, and self-limited.  This has been going on the last several months.  Symptoms are not debilitating but are bothersome.  No changes in hearing.  No aural fullness.    PAST MEDICAL HISTORY:  Past Medical History:   Diagnosis Date    Diarrhea     Gastritis     GERD (gastroesophageal reflux disease)     HLD (hyperlipidemia)     Hypertension        PAST SURGICAL HISTORY:  Past Surgical History:   Procedure Laterality Date    CYSTOSCOPY      NEPHROSTOMY TUBE      CYSTOSCOPY WITH STENT    SHOULDER SURGERY  9-17-13    right    TOTAL REDUCTION MAMMOPLASTY Bilateral 2018    URETERAL STENT PLACEMENT      THEN REMOVED       CURRENT MEDS:  Current Outpatient Medications   Medication Sig Dispense Refill    aspirin (ECOTRIN) 81 MG EC tablet Take 81 mg by mouth once daily.      atorvastatin (LIPITOR) 20 MG tablet TAKE ONE TABLET BY MOUTH EVERY NIGHT AT BEDTIME 90 tablet 3    fish oil-omega-3 fatty acids 300-1,000 mg capsule Take 2 g by mouth once daily.      fluticasone propionate (FLONASE) 50 mcg/actuation nasal spray       Lactobacillus rhamnosus GG (CULTURELLE) 10 billion cell capsule Take 1 capsule by mouth once daily.      losartan (COZAAR) 50 MG tablet TAKE ONE TABLET BY MOUTH EVERY DAY 90 tablet 0    magnesium 30 mg Tab Take by mouth.      multivitamin capsule Take 1 capsule by mouth once daily.      omeprazole (PRILOSEC) 40 MG capsule Take 1 capsule (40 mg total) by mouth once daily. 30 capsule 2    triamterene-hydrochlorothiazide 37.5-25 mg (DYAZIDE) 37.5-25 mg per capsule TAKE ONE CAPSULE BY MOUTH EVERY DAY 90 capsule 0    AFLURIA QD  ",3YR UP,,PF, 60 mcg (15 mcg x 4)/0.5 mL Syrg       diclofenac sodium (VOLTAREN) 1 % Gel Apply 2 g topically 4 (four) times daily. (Patient not taking: Reported on 6/3/2022) 1 Tube 2    meloxicam (MOBIC) 15 MG tablet TAKE ONE TABLET BY MOUTH EVERY DAY  (Patient not taking: Reported on 6/3/2022) 30 tablet 2    SHINGRIX, PF, 50 mcg/0.5 mL injection        Current Facility-Administered Medications   Medication Dose Route Frequency Provider Last Rate Last Admin    lidocaine-EPINEPHrine 1%-1:100,000 30 mL, lidocaine HCL 10 mg/ml (1%) 20 mL, sodium bicarbonate 1 mEq/mL (8.4 %) 10 mL in sodium chloride 0.9% 500 mL solution   MISCELLANEOUS 1 time in Clinic/HOD Raad Rodriguez MD           ALLERGIES:  Review of patient's allergies indicates:  No Known Allergies    FAMILY HISTORY:  Family History   Problem Relation Age of Onset    No Known Problems Mother     Hypertension Father     Lung cancer Father         d. 72    No Known Problems Sister     Hypertension Brother     No Known Problems Daughter         [facility]- residency Ophth    No Known Problems Son         @ - therapist- OT; one child - P[place]  2018       SOCIAL HISTORY:  Social History     Tobacco Use    Smoking status: Never Smoker    Smokeless tobacco: Never Used   Substance Use Topics    Alcohol use: No    Drug use: No       Review of Systems:  Gen: no fever, no chills, no generalized feeling of weakness   HEENT: no double vision, no blurred vision, no eye pain, no eye exudates. ?    Heart: no chest pain, no SOB    Lungs: no SOB, no cough    MSK: no weakness of legs, intact ROM    ABD: no abd pain, no N/V/D/C, no difficulty with defecation.    Extremities: No leg pain, no edema.       Objective:     Vitals:    22 0909   BP: 135/88   BP Location: Left arm   Patient Position: Sitting   Pulse: 94   SpO2: 98%   Weight: 74.1 kg (163 lb 5.8 oz)   Height: 5' 4" (1.626 m)     General:  Female in NAD, alert and awake, Aox3, well " groomed. ?    ? ?    HEENT: Head is NC/AT EOMI, pupil size: 4 mm B/L, no nystagmus noted; hearing grossly intact b/l. Mucous membrane moist, uvula midline, no pharyngeal erythema, exudates or discharges.      Neck: Supple. no nuchal rigidity.      Cardiovascular: well perfused, no cyanosis        Respiratory: Symmetric chest rise noted       Musculoskeletal: Muscle tone noted to be adequate for patient age, muscle mass is WNL. No spontaneous movements or fasciculations noted during this examination.        Neurological Examination.    Mental status: AA&O x3; Affect/mood is euthymic/congruent; no aphasia noted during examination. Patient answers simple questions appropriately & follows simple commands; no dysarthria or expressive aphasia; no karina-neglect or extinction. Vocabulary/word finding: excellent.       Cranial Nerves: II-XII grossly intact.  Reported deficits in cranial nerve 1 function     Muscle Function: Tone WNL and Muscle bulk WNL.  Full and symmetric strength of bilateral upper and lower extremities.       Sensory: ?  Intact to light touch throughout     Reflexes: Left and Right biceps, triceps, brachioradialis are 2+/4. patellar 2+/4 on Left and 2+/4 on Right     Gait: adequate casual gait with stride length and arm swing WNL.     Other:  Extensive review of all labs available reviewed during encounter with special attention to MIKAELA, CBC, CMP, TSH, crp, SSA    Assessment:   Ivelisse Hay is a 61 y.o. female presenting for evaluation of history of phantosmia now with persistent decreased sense of taste and smell.  Also notes recent development of symptoms that are consistent with benign paroxysmal positional vertigo.  Appropriate laboratory studies for phantom scent have been completed over the last couple of years.  Only element of workup missing would be advanced neurological imaging with MRI which is also appropriate with recent development of episodic vertigo.      Plan:     Problem List Items  Addressed This Visit        Other    Olfactory hallucinations    Relevant Orders    MRI Brain Without Contrast      Other Visit Diagnoses     Benign paroxysmal positional vertigo, unspecified laterality    -  Primary    Dizziness and giddiness        Relevant Orders    MRI Brain Without Contrast        - Provided with teodoro boss exercises; may consider vestibular rehab in the future if needed.  - No specific interventions for sensory complaints if imaging found to be normal. Patient was encouraged to maintain healthy diet, regular physical activity, good sleep hygiene, and stress management  - may consider scent training  - if MRI is found to be normal, no other interventions from a neurological perspective given thorough laboratory diagnostics since symptom onset many years ago.    I spent a total of 60 minutes on the day of the visit. This includes face to face time and non-face to face time preparing to see the patient (eg, review of tests), obtaining and/or reviewing separately obtained history, documenting clinical information in the electronic or other health record, independently interpreting results and communicating results to the patient/family/caregiver, or care coordinator.    A dictation device was used to produce this document. Use of such devices sometimes results in grammatical errors or replacement of words that sound similarly.    Isaias Castillo, DO

## 2022-06-21 ENCOUNTER — HOSPITAL ENCOUNTER (OUTPATIENT)
Dept: RADIOLOGY | Facility: HOSPITAL | Age: 62
Discharge: HOME | End: 2022-06-21
Attending: PSYCHIATRY & NEUROLOGY
Payer: MEDICARE

## 2022-06-21 DIAGNOSIS — R42 DIZZINESS AND GIDDINESS: ICD-10-CM

## 2022-06-21 DIAGNOSIS — R44.2 OLFACTORY HALLUCINATIONS: ICD-10-CM

## 2022-06-21 PROCEDURE — 70551 MRI BRAIN WITHOUT CONTRAST: ICD-10-PCS | Mod: 26,,, | Performed by: RADIOLOGY

## 2022-06-21 PROCEDURE — 70551 MRI BRAIN STEM W/O DYE: CPT | Mod: TC

## 2022-06-21 PROCEDURE — 70551 MRI BRAIN STEM W/O DYE: CPT | Mod: 26,,, | Performed by: RADIOLOGY

## 2022-06-28 RX ORDER — TRIAMTERENE AND HYDROCHLOROTHIAZIDE 37.5; 25 MG/1; MG/1
CAPSULE ORAL
Qty: 90 CAPSULE | Refills: 0 | Status: SHIPPED | OUTPATIENT
Start: 2022-06-28 | End: 2022-11-13

## 2022-07-26 ENCOUNTER — OFFICE VISIT (OUTPATIENT)
Dept: OPTOMETRY | Facility: CLINIC | Age: 62
End: 2022-07-26
Payer: MEDICARE

## 2022-07-26 DIAGNOSIS — H25.13 NUCLEAR SCLEROSIS, BILATERAL: Primary | ICD-10-CM

## 2022-07-26 DIAGNOSIS — H52.4 ASTIGMATISM OF BOTH EYES WITH PRESBYOPIA: ICD-10-CM

## 2022-07-26 DIAGNOSIS — H52.203 ASTIGMATISM OF BOTH EYES WITH PRESBYOPIA: ICD-10-CM

## 2022-07-26 DIAGNOSIS — Z13.5 GLAUCOMA SCREENING: ICD-10-CM

## 2022-07-26 PROCEDURE — 99999 PR PBB SHADOW E&M-EST. PATIENT-LVL III: ICD-10-PCS | Mod: PBBFAC,,, | Performed by: OPTOMETRIST

## 2022-07-26 PROCEDURE — 99999 PR PBB SHADOW E&M-EST. PATIENT-LVL III: CPT | Mod: PBBFAC,,, | Performed by: OPTOMETRIST

## 2022-07-26 PROCEDURE — 92015 DETERMINE REFRACTIVE STATE: CPT | Mod: S$GLB,,, | Performed by: OPTOMETRIST

## 2022-07-26 PROCEDURE — 1159F MED LIST DOCD IN RCRD: CPT | Mod: CPTII,S$GLB,, | Performed by: OPTOMETRIST

## 2022-07-26 PROCEDURE — 92004 COMPRE OPH EXAM NEW PT 1/>: CPT | Mod: S$GLB,,, | Performed by: OPTOMETRIST

## 2022-07-26 PROCEDURE — 4010F PR ACE/ARB THEARPY RXD/TAKEN: ICD-10-PCS | Mod: CPTII,S$GLB,, | Performed by: OPTOMETRIST

## 2022-07-26 PROCEDURE — 1159F PR MEDICATION LIST DOCUMENTED IN MEDICAL RECORD: ICD-10-PCS | Mod: CPTII,S$GLB,, | Performed by: OPTOMETRIST

## 2022-07-26 PROCEDURE — 92015 PR REFRACTION: ICD-10-PCS | Mod: S$GLB,,, | Performed by: OPTOMETRIST

## 2022-07-26 PROCEDURE — 92004 PR EYE EXAM, NEW PATIENT,COMPREHESV: ICD-10-PCS | Mod: S$GLB,,, | Performed by: OPTOMETRIST

## 2022-07-26 PROCEDURE — 1160F RVW MEDS BY RX/DR IN RCRD: CPT | Mod: CPTII,S$GLB,, | Performed by: OPTOMETRIST

## 2022-07-26 PROCEDURE — 1160F PR REVIEW ALL MEDS BY PRESCRIBER/CLIN PHARMACIST DOCUMENTED: ICD-10-PCS | Mod: CPTII,S$GLB,, | Performed by: OPTOMETRIST

## 2022-07-26 PROCEDURE — 4010F ACE/ARB THERAPY RXD/TAKEN: CPT | Mod: CPTII,S$GLB,, | Performed by: OPTOMETRIST

## 2022-07-26 NOTE — PROGRESS NOTES
HPI     Patient comes in today for overall vision changes with rx gls that at   least 5 yrs old. No f/f. Denies any pain    Gtts: Systane prn OU     Last edited by Danis Delgadillo MA on 7/26/2022  2:43 PM. (History)        ROS     Negative for: Constitutional, Gastrointestinal, Neurological, Skin,   Genitourinary, Musculoskeletal, HENT, Endocrine, Cardiovascular, Eyes,   Respiratory, Psychiatric, Allergic/Imm, Heme/Lymph    Last edited by Zbigniew Wagner, OD on 7/26/2022  2:56 PM. (History)        Assessment /Plan     For exam results, see Encounter Report.    Nuclear sclerosis, bilateral    Glaucoma screening    Astigmatism of both eyes with presbyopia      Small cats OU--wrote new spex Rx    PLAN:    rtc 1 yr

## 2022-07-29 ENCOUNTER — PATIENT MESSAGE (OUTPATIENT)
Dept: INTERNAL MEDICINE | Facility: CLINIC | Age: 62
End: 2022-07-29
Payer: MEDICARE

## 2022-08-22 ENCOUNTER — TELEPHONE (OUTPATIENT)
Dept: INTERNAL MEDICINE | Facility: CLINIC | Age: 62
End: 2022-08-22
Payer: MEDICARE

## 2022-08-22 DIAGNOSIS — M81.8 OTHER OSTEOPOROSIS WITHOUT CURRENT PATHOLOGICAL FRACTURE: ICD-10-CM

## 2022-08-22 DIAGNOSIS — I10 ESSENTIAL HYPERTENSION: Primary | ICD-10-CM

## 2022-08-22 DIAGNOSIS — E78.5 HYPERLIPIDEMIA, UNSPECIFIED HYPERLIPIDEMIA TYPE: ICD-10-CM

## 2022-08-22 DIAGNOSIS — R73.03 PRE-DIABETES: ICD-10-CM

## 2022-08-22 NOTE — TELEPHONE ENCOUNTER
Spoke to pt. Fasting lab and urine scheduled for Friday, 9/2/22 here at the [facility].  Pt requesting an order for a calcium score CT.  I advised that she f/u with Dr. Tejeda on this when she comes in to see her.

## 2022-08-22 NOTE — TELEPHONE ENCOUNTER
----- Message from Mabel Wyman sent at 8/22/2022 10:44 AM CDT -----  Contact: Pt [phone]  type: Lab    Caller is requesting to schedule their Lab appointment prior to annual appointment.  Order is not listed in EPIC.  Please enter order and contact patient to schedule.    Date of Annual Physical Appointment: 9/7/22 The Monday before around 10am.    Where would they like the lab performed? [facility]     Would the patient rather a call back or a response via [facility]? Portal     Pt also stated that she would like to have a calcium check.

## 2022-08-25 NOTE — TELEPHONE ENCOUNTER
Refill has been approved for 90 days   Spironolactone Counseling: Patient advised regarding risks of diarrhea, abdominal pain, hyperkalemia, birth defects (for female patients), liver toxicity and renal toxicity. The patient may need blood work to monitor liver and kidney function and potassium levels while on therapy. The patient verbalized understanding of the proper use and possible adverse effects of spironolactone.  All of the patient's questions and concerns were addressed.

## 2022-09-02 ENCOUNTER — LAB VISIT (OUTPATIENT)
Dept: LAB | Facility: HOSPITAL | Age: 62
End: 2022-09-02
Attending: INTERNAL MEDICINE
Payer: MEDICARE

## 2022-09-02 DIAGNOSIS — I10 ESSENTIAL HYPERTENSION: ICD-10-CM

## 2022-09-02 DIAGNOSIS — E78.5 HYPERLIPIDEMIA, UNSPECIFIED HYPERLIPIDEMIA TYPE: ICD-10-CM

## 2022-09-02 DIAGNOSIS — R73.03 PRE-DIABETES: ICD-10-CM

## 2022-09-02 DIAGNOSIS — M81.8 OTHER OSTEOPOROSIS WITHOUT CURRENT PATHOLOGICAL FRACTURE: ICD-10-CM

## 2022-09-02 LAB
25(OH)D3+25(OH)D2 SERPL-MCNC: 70 NG/ML (ref 30–96)
ALBUMIN SERPL BCP-MCNC: 4.4 G/DL (ref 3.5–5.2)
ALP SERPL-CCNC: 71 U/L (ref 55–135)
ALT SERPL W/O P-5'-P-CCNC: 33 U/L (ref 10–44)
ANION GAP SERPL CALC-SCNC: 13 MMOL/L (ref 8–16)
AST SERPL-CCNC: 28 U/L (ref 10–40)
BASOPHILS # BLD AUTO: 0.04 K/UL (ref 0–0.2)
BASOPHILS NFR BLD: 0.5 % (ref 0–1.9)
BILIRUB SERPL-MCNC: 0.6 MG/DL (ref 0.1–1)
BUN SERPL-MCNC: 16 MG/DL (ref 8–23)
CALCIUM SERPL-MCNC: 10.3 MG/DL (ref 8.7–10.5)
CHLORIDE SERPL-SCNC: 104 MMOL/L (ref 95–110)
CHOLEST SERPL-MCNC: 230 MG/DL (ref 120–199)
CHOLEST/HDLC SERPL: 3.5 {RATIO} (ref 2–5)
CO2 SERPL-SCNC: 25 MMOL/L (ref 23–29)
CREAT SERPL-MCNC: 1 MG/DL (ref 0.5–1.4)
DIFFERENTIAL METHOD: NORMAL
EOSINOPHIL # BLD AUTO: 0.2 K/UL (ref 0–0.5)
EOSINOPHIL NFR BLD: 2.5 % (ref 0–8)
ERYTHROCYTE [DISTWIDTH] IN BLOOD BY AUTOMATED COUNT: 13 % (ref 11.5–14.5)
EST. GFR  (NO RACE VARIABLE): >60 ML/MIN/1.73 M^2
ESTIMATED AVG GLUCOSE: 117 MG/DL (ref 68–131)
GLUCOSE SERPL-MCNC: 111 MG/DL (ref 70–110)
HBA1C MFR BLD: 5.7 % (ref 4–5.6)
HCT VFR BLD AUTO: 45.2 % (ref 37–48.5)
HDLC SERPL-MCNC: 65 MG/DL (ref 40–75)
HDLC SERPL: 28.3 % (ref 20–50)
HGB BLD-MCNC: 14.8 G/DL (ref 12–16)
IMM GRANULOCYTES # BLD AUTO: 0.03 K/UL (ref 0–0.04)
IMM GRANULOCYTES NFR BLD AUTO: 0.4 % (ref 0–0.5)
LDLC SERPL CALC-MCNC: 123.8 MG/DL (ref 63–159)
LYMPHOCYTES # BLD AUTO: 2.5 K/UL (ref 1–4.8)
LYMPHOCYTES NFR BLD: 30.5 % (ref 18–48)
MCH RBC QN AUTO: 29.4 PG (ref 27–31)
MCHC RBC AUTO-ENTMCNC: 32.7 G/DL (ref 32–36)
MCV RBC AUTO: 90 FL (ref 82–98)
MONOCYTES # BLD AUTO: 0.6 K/UL (ref 0.3–1)
MONOCYTES NFR BLD: 7 % (ref 4–15)
NEUTROPHILS # BLD AUTO: 4.9 K/UL (ref 1.8–7.7)
NEUTROPHILS NFR BLD: 59.1 % (ref 38–73)
NONHDLC SERPL-MCNC: 165 MG/DL
NRBC BLD-RTO: 0 /100 WBC
PLATELET # BLD AUTO: 249 K/UL (ref 150–450)
PMV BLD AUTO: 10.7 FL (ref 9.2–12.9)
POTASSIUM SERPL-SCNC: 4.2 MMOL/L (ref 3.5–5.1)
PROT SERPL-MCNC: 7.7 G/DL (ref 6–8.4)
RBC # BLD AUTO: 5.03 M/UL (ref 4–5.4)
SODIUM SERPL-SCNC: 142 MMOL/L (ref 136–145)
TRIGL SERPL-MCNC: 206 MG/DL (ref 30–150)
TSH SERPL DL<=0.005 MIU/L-ACNC: 1.4 UIU/ML (ref 0.4–4)
WBC # BLD AUTO: 8.3 K/UL (ref 3.9–12.7)

## 2022-09-02 PROCEDURE — 84443 ASSAY THYROID STIM HORMONE: CPT | Performed by: INTERNAL MEDICINE

## 2022-09-02 PROCEDURE — 83036 HEMOGLOBIN GLYCOSYLATED A1C: CPT | Performed by: INTERNAL MEDICINE

## 2022-09-02 PROCEDURE — 80053 COMPREHEN METABOLIC PANEL: CPT | Performed by: INTERNAL MEDICINE

## 2022-09-02 PROCEDURE — 82306 VITAMIN D 25 HYDROXY: CPT | Performed by: INTERNAL MEDICINE

## 2022-09-02 PROCEDURE — 80061 LIPID PANEL: CPT | Performed by: INTERNAL MEDICINE

## 2022-09-02 PROCEDURE — 85025 COMPLETE CBC W/AUTO DIFF WBC: CPT | Performed by: INTERNAL MEDICINE

## 2022-09-02 PROCEDURE — 36415 COLL VENOUS BLD VENIPUNCTURE: CPT | Mod: PO | Performed by: INTERNAL MEDICINE

## 2022-09-05 PROBLEM — R42 VERTIGO: Status: ACTIVE | Noted: 2022-09-05

## 2022-09-05 NOTE — PROGRESS NOTES
63 yo female presents for  Annual PE  Nonsmoker  Social ETOH    HEALTH MAINT:  Chol/lab, reviewed-  C-scope,9/2012  Impression: - normal,  7 years   EGD, 5/2017-  Impression:           - Hiatal hernia.                        - Gastritis. Biopsied.                        - Normal examined duodenum. B  WWE, 2020, pap- normal, asx  Mammo, pending   BMD, pt not ready to update at this time  EYE exam, pending  DDS exam,UTD    VACCINATIONS:  Tdap, 12/31/2013  Flu, yearly    MedCard reviewed.   REVIEW OF SYSTEMS:   CONSTITUTIONAL: No fever, no chills, no night sweats.   EYES: No double vision or itchy watery eyes.   No focal deficits or neurologic sx  No left ear/jaw discomfort ;  ENT: Loss of smell and taste , w/up w/ ENT and Neurology unrevealing  CARDIOVASCULAR: No angina or palpitations.   No claudication w/ exercise-walking  RESPIRATORY: No cough or wheezing.   GI: Occ. indigestion or heartburn.   No blood in her stool or urine.    NEUROLOGICAL: No unusual headaches. No focal deficits. Olfactory hallucinations- head CT unremarkable  MS:   SKIN: no new lesions    Remainder of review negative except as previously noted.         PHYSICAL EXAM:   VS: stable  GENERAL: She is alert and oriented in no acute distress. WDWN, conversant and cooperative. Pleasant pt  EYES: Conjunctivae and lids are okay.   Sclera anictericPupils are reactive.   ENT: Hearing intact  NECK: Supple. No thyromegaly or lymphadenopathy noted  RESPIRATORY: Efforts are unlabored.   LUNGS: Clear to auscultation.   HEART: Regular rate and rhythm. No carotid bruits,   1+ pedal pulses, no edema.   ABDOMEN: Bowel sounds present, soft, and nontender. No hepatosplenomegaly.  MS: Gait nl, No CCE  NEURO: CARRILLO. No tremor noted  SKIN: Warm and dry      IMPRESSION:  Annual PE  HTN, stable  HLD. Elevated LDL- consider rosuvastatin- takes atorvastatin 3 x weekly 2/2 to foot pain/paresthesias, that improved w/ decreased frequency dosing  Aortic atherosclerosis,  asx  IFBS/ Pre- DM, stable  NAFLD, stable  Phantosmia, Neuro Consult 6/2022, unrevealing w/up  Vertigo  Sinus disease- noted on MRI  Microvascular ds- noted on MRI  Joint pain , multisite, synovitis metacarpals  IBS sx of frequent BM, to log food to see if any pattern  GERD, intermittent  Osteoporosis, on Ca and Vitamin D - level quite good at this time    PLAN:  Continue present meds  Complete atorvastatin , then call for rosuvastatin 10mg  Mammo  DEXA- declined at this time  Reviewed Prevnar 20, flu vaccine and Covid update  Diet  Exercise  Call prn  RTC 6 mos w/ lipid panel, CMP, and A1C

## 2022-09-06 ENCOUNTER — TELEPHONE (OUTPATIENT)
Dept: INTERNAL MEDICINE | Facility: CLINIC | Age: 62
End: 2022-09-06
Payer: MEDICARE

## 2022-09-06 NOTE — TELEPHONE ENCOUNTER
----- Message from Cristal Enriquez MD sent at 9/4/2022  1:58 PM CDT -----  Ivelisse  Please review your lab work and we will discuss at your pending office visit.   Cristal Giordano

## 2022-09-07 ENCOUNTER — OFFICE VISIT (OUTPATIENT)
Dept: INTERNAL MEDICINE | Facility: CLINIC | Age: 62
End: 2022-09-07
Payer: MEDICARE

## 2022-09-07 VITALS
DIASTOLIC BLOOD PRESSURE: 62 MMHG | TEMPERATURE: 97 F | WEIGHT: 165.81 LBS | HEIGHT: 64 IN | HEART RATE: 94 BPM | SYSTOLIC BLOOD PRESSURE: 134 MMHG | BODY MASS INDEX: 28.31 KG/M2 | OXYGEN SATURATION: 100 % | RESPIRATION RATE: 20 BRPM

## 2022-09-07 DIAGNOSIS — I10 ESSENTIAL HYPERTENSION: ICD-10-CM

## 2022-09-07 DIAGNOSIS — R73.03 PRE-DIABETES: ICD-10-CM

## 2022-09-07 DIAGNOSIS — Z12.31 ENCOUNTER FOR SCREENING MAMMOGRAM FOR BREAST CANCER: ICD-10-CM

## 2022-09-07 DIAGNOSIS — R42 VERTIGO: ICD-10-CM

## 2022-09-07 DIAGNOSIS — Z00.00 ANNUAL PHYSICAL EXAM: Primary | ICD-10-CM

## 2022-09-07 DIAGNOSIS — M81.8 OTHER OSTEOPOROSIS WITHOUT CURRENT PATHOLOGICAL FRACTURE: ICD-10-CM

## 2022-09-07 DIAGNOSIS — E78.5 HYPERLIPIDEMIA, UNSPECIFIED HYPERLIPIDEMIA TYPE: ICD-10-CM

## 2022-09-07 DIAGNOSIS — R44.2 PHANTOSMIA: ICD-10-CM

## 2022-09-07 DIAGNOSIS — I70.0 AORTIC ATHEROSCLEROSIS: ICD-10-CM

## 2022-09-07 DIAGNOSIS — K76.0 NAFLD (NONALCOHOLIC FATTY LIVER DISEASE): ICD-10-CM

## 2022-09-07 DIAGNOSIS — K21.9 GASTROESOPHAGEAL REFLUX DISEASE WITHOUT ESOPHAGITIS: ICD-10-CM

## 2022-09-07 DIAGNOSIS — J34.9 SINUS DISEASE: ICD-10-CM

## 2022-09-07 DIAGNOSIS — R73.01 IMPAIRED FASTING GLUCOSE: ICD-10-CM

## 2022-09-07 PROCEDURE — 99499 RISK ADDL DX/OHS AUDIT: ICD-10-PCS | Mod: S$GLB,,, | Performed by: INTERNAL MEDICINE

## 2022-09-07 PROCEDURE — 3066F NEPHROPATHY DOC TX: CPT | Mod: CPTII,S$GLB,, | Performed by: INTERNAL MEDICINE

## 2022-09-07 PROCEDURE — 3075F PR MOST RECENT SYSTOLIC BLOOD PRESS GE 130-139MM HG: ICD-10-PCS | Mod: CPTII,S$GLB,, | Performed by: INTERNAL MEDICINE

## 2022-09-07 PROCEDURE — 4010F ACE/ARB THERAPY RXD/TAKEN: CPT | Mod: CPTII,S$GLB,, | Performed by: INTERNAL MEDICINE

## 2022-09-07 PROCEDURE — 3061F NEG MICROALBUMINURIA REV: CPT | Mod: CPTII,S$GLB,, | Performed by: INTERNAL MEDICINE

## 2022-09-07 PROCEDURE — 3061F PR NEG MICROALBUMINURIA RESULT DOCUMENTED/REVIEW: ICD-10-PCS | Mod: CPTII,S$GLB,, | Performed by: INTERNAL MEDICINE

## 2022-09-07 PROCEDURE — 99999 PR PBB SHADOW E&M-EST. PATIENT-LVL IV: ICD-10-PCS | Mod: PBBFAC,,, | Performed by: INTERNAL MEDICINE

## 2022-09-07 PROCEDURE — 3044F PR MOST RECENT HEMOGLOBIN A1C LEVEL <7.0%: ICD-10-PCS | Mod: CPTII,S$GLB,, | Performed by: INTERNAL MEDICINE

## 2022-09-07 PROCEDURE — 3066F PR DOCUMENTATION OF TREATMENT FOR NEPHROPATHY: ICD-10-PCS | Mod: CPTII,S$GLB,, | Performed by: INTERNAL MEDICINE

## 2022-09-07 PROCEDURE — 3078F DIAST BP <80 MM HG: CPT | Mod: CPTII,S$GLB,, | Performed by: INTERNAL MEDICINE

## 2022-09-07 PROCEDURE — 3008F PR BODY MASS INDEX (BMI) DOCUMENTED: ICD-10-PCS | Mod: CPTII,S$GLB,, | Performed by: INTERNAL MEDICINE

## 2022-09-07 PROCEDURE — 99999 PR PBB SHADOW E&M-EST. PATIENT-LVL IV: CPT | Mod: PBBFAC,,, | Performed by: INTERNAL MEDICINE

## 2022-09-07 PROCEDURE — 3044F HG A1C LEVEL LT 7.0%: CPT | Mod: CPTII,S$GLB,, | Performed by: INTERNAL MEDICINE

## 2022-09-07 PROCEDURE — 99396 PREV VISIT EST AGE 40-64: CPT | Mod: GZ,S$GLB,, | Performed by: INTERNAL MEDICINE

## 2022-09-07 PROCEDURE — 99499 UNLISTED E&M SERVICE: CPT | Mod: S$GLB,,, | Performed by: INTERNAL MEDICINE

## 2022-09-07 PROCEDURE — 1159F MED LIST DOCD IN RCRD: CPT | Mod: CPTII,S$GLB,, | Performed by: INTERNAL MEDICINE

## 2022-09-07 PROCEDURE — 1159F PR MEDICATION LIST DOCUMENTED IN MEDICAL RECORD: ICD-10-PCS | Mod: CPTII,S$GLB,, | Performed by: INTERNAL MEDICINE

## 2022-09-07 PROCEDURE — 3075F SYST BP GE 130 - 139MM HG: CPT | Mod: CPTII,S$GLB,, | Performed by: INTERNAL MEDICINE

## 2022-09-07 PROCEDURE — 3078F PR MOST RECENT DIASTOLIC BLOOD PRESSURE < 80 MM HG: ICD-10-PCS | Mod: CPTII,S$GLB,, | Performed by: INTERNAL MEDICINE

## 2022-09-07 PROCEDURE — 99396 PR PREVENTIVE VISIT,EST,40-64: ICD-10-PCS | Mod: GZ,S$GLB,, | Performed by: INTERNAL MEDICINE

## 2022-09-07 PROCEDURE — 3008F BODY MASS INDEX DOCD: CPT | Mod: CPTII,S$GLB,, | Performed by: INTERNAL MEDICINE

## 2022-09-07 PROCEDURE — 4010F PR ACE/ARB THEARPY RXD/TAKEN: ICD-10-PCS | Mod: CPTII,S$GLB,, | Performed by: INTERNAL MEDICINE

## 2022-09-13 ENCOUNTER — PES CALL (OUTPATIENT)
Dept: ADMINISTRATIVE | Facility: CLINIC | Age: 62
End: 2022-09-13
Payer: MEDICARE

## 2022-09-27 ENCOUNTER — PATIENT MESSAGE (OUTPATIENT)
Dept: INTERNAL MEDICINE | Facility: CLINIC | Age: 62
End: 2022-09-27
Payer: MEDICARE

## 2022-09-27 DIAGNOSIS — Z13.6 ENCOUNTER FOR SCREENING FOR CARDIOVASCULAR DISORDERS: Primary | ICD-10-CM

## 2022-09-27 DIAGNOSIS — E78.5 HYPERLIPIDEMIA, UNSPECIFIED HYPERLIPIDEMIA TYPE: ICD-10-CM

## 2022-09-27 NOTE — TELEPHONE ENCOUNTER
Pt states atorvastatin is not agreeing with her and would like to try the rosuvastatin as you suggested. Also since she has higher than normal cholesterol and high BP is she a candidate for coronary calcium screening. Please advise

## 2022-09-28 NOTE — TELEPHONE ENCOUNTER
Coronary Calcium CT is not covered by insurance, but happy to order if you want  Will hold off on statin until have results if you want to have the CT in case your score is zero and statins not required    Coronary Calcium CT test ordered, please facilitate if pt agreeable

## 2022-10-03 ENCOUNTER — PATIENT MESSAGE (OUTPATIENT)
Dept: INTERNAL MEDICINE | Facility: CLINIC | Age: 62
End: 2022-10-03
Payer: MEDICARE

## 2022-10-03 DIAGNOSIS — E78.5 HYPERLIPIDEMIA, UNSPECIFIED HYPERLIPIDEMIA TYPE: Primary | ICD-10-CM

## 2022-10-10 ENCOUNTER — PES CALL (OUTPATIENT)
Dept: ADMINISTRATIVE | Facility: CLINIC | Age: 62
End: 2022-10-10
Payer: MEDICARE

## 2022-10-14 ENCOUNTER — HOSPITAL ENCOUNTER (OUTPATIENT)
Dept: RADIOLOGY | Facility: HOSPITAL | Age: 62
Discharge: HOME | End: 2022-10-14
Attending: INTERNAL MEDICINE
Payer: MEDICARE

## 2022-10-14 DIAGNOSIS — Z12.31 ENCOUNTER FOR SCREENING MAMMOGRAM FOR BREAST CANCER: ICD-10-CM

## 2022-10-14 PROCEDURE — 77063 BREAST TOMOSYNTHESIS BI: CPT | Mod: TC,PO

## 2022-10-14 PROCEDURE — 77067 SCR MAMMO BI INCL CAD: CPT | Mod: 26,,, | Performed by: RADIOLOGY

## 2022-10-14 PROCEDURE — 77063 MAMMO DIGITAL SCREENING BILAT WITH TOMO: ICD-10-PCS | Mod: 26,,, | Performed by: RADIOLOGY

## 2022-10-14 PROCEDURE — 77067 MAMMO DIGITAL SCREENING BILAT WITH TOMO: ICD-10-PCS | Mod: 26,,, | Performed by: RADIOLOGY

## 2022-10-14 PROCEDURE — 77063 BREAST TOMOSYNTHESIS BI: CPT | Mod: 26,,, | Performed by: RADIOLOGY

## 2022-10-15 ENCOUNTER — TELEPHONE (OUTPATIENT)
Dept: INTERNAL MEDICINE | Facility: CLINIC | Age: 62
End: 2022-10-15
Payer: MEDICARE

## 2022-10-15 NOTE — TELEPHONE ENCOUNTER
----- Message from Cristal Enriquez MD sent at 10/15/2022 11:50 AM CDT -----  Please note that your mammogram was read as follows  Impression:  There is no mammographic evidence of malignancy.   jay jay

## 2022-10-18 ENCOUNTER — PES CALL (OUTPATIENT)
Dept: ADMINISTRATIVE | Facility: CLINIC | Age: 62
End: 2022-10-18
Payer: MEDICARE

## 2022-10-19 ENCOUNTER — PATIENT MESSAGE (OUTPATIENT)
Dept: INTERNAL MEDICINE | Facility: CLINIC | Age: 62
End: 2022-10-19
Payer: MEDICARE

## 2022-10-28 ENCOUNTER — PATIENT MESSAGE (OUTPATIENT)
Dept: INTERNAL MEDICINE | Facility: CLINIC | Age: 62
End: 2022-10-28
Payer: MEDICARE

## 2022-10-28 ENCOUNTER — HOSPITAL ENCOUNTER (OUTPATIENT)
Dept: RADIOLOGY | Facility: HOSPITAL | Age: 62
Discharge: HOME | End: 2022-10-28
Attending: INTERNAL MEDICINE
Payer: MEDICARE

## 2022-10-28 DIAGNOSIS — Z13.6 ENCOUNTER FOR SCREENING FOR CARDIOVASCULAR DISORDERS: ICD-10-CM

## 2022-10-28 DIAGNOSIS — E78.5 HYPERLIPIDEMIA, UNSPECIFIED HYPERLIPIDEMIA TYPE: ICD-10-CM

## 2022-10-28 PROCEDURE — 75571 CT HRT W/O DYE W/CA TEST: CPT | Mod: 26,,, | Performed by: RADIOLOGY

## 2022-10-28 PROCEDURE — 75571 CT CALCIUM SCORING CARDIAC: ICD-10-PCS | Mod: 26,,, | Performed by: RADIOLOGY

## 2022-10-28 PROCEDURE — 75571 CT HRT W/O DYE W/CA TEST: CPT | Mod: TC

## 2022-10-30 ENCOUNTER — TELEPHONE (OUTPATIENT)
Dept: INTERNAL MEDICINE | Facility: CLINIC | Age: 62
End: 2022-10-30
Payer: MEDICARE

## 2022-10-30 DIAGNOSIS — R73.03 PRE-DIABETES: ICD-10-CM

## 2022-10-30 DIAGNOSIS — E78.5 HYPERLIPIDEMIA, UNSPECIFIED HYPERLIPIDEMIA TYPE: Primary | ICD-10-CM

## 2022-10-30 DIAGNOSIS — I10 ESSENTIAL HYPERTENSION: ICD-10-CM

## 2022-10-30 RX ORDER — ROSUVASTATIN CALCIUM 20 MG/1
20 TABLET, COATED ORAL DAILY
Qty: 90 TABLET | Refills: 3 | Status: SHIPPED | OUTPATIENT
Start: 2022-10-30 | End: 2023-11-05

## 2022-10-30 NOTE — TELEPHONE ENCOUNTER
----- Message from Cristal Enriquez MD sent at 10/29/2022 10:07 AM CDT -----  Ivelisse,  Your coronary calcium CT has resulted and your score puts you at an increased risk for heart disease.   The recommendation for statin therapy would be for rosuvastatin 20mg, or atorvastatin 40mg daily.  Please advise if you are agreeable to this plan and a prescription will sent to your pharmacy.  jay jay

## 2022-10-30 NOTE — TELEPHONE ENCOUNTER
----- Message from Cristal Enriquez MD sent at 10/29/2022 10:07 AM CDT -----  Sent pt portal message on her lab and rec for Rx statin  SHe has 3/2023 appt, will need lipid panel and CMP pre-visit  thanks

## 2022-10-30 NOTE — TELEPHONE ENCOUNTER
Noted.  Pt has reviewed lab results and recommendations in [facility]. Will wait for response.      Labs ordered and scheduled prior to visit.

## 2022-11-13 RX ORDER — TRIAMTERENE AND HYDROCHLOROTHIAZIDE 37.5; 25 MG/1; MG/1
CAPSULE ORAL
Qty: 90 CAPSULE | Refills: 3 | Status: SHIPPED | OUTPATIENT
Start: 2022-11-13 | End: 2023-12-05 | Stop reason: SDUPTHER

## 2022-11-13 RX ORDER — LOSARTAN POTASSIUM 50 MG/1
TABLET ORAL
Qty: 90 TABLET | Refills: 3 | Status: SHIPPED | OUTPATIENT
Start: 2022-11-13 | End: 2023-11-22 | Stop reason: SDUPTHER

## 2022-12-16 ENCOUNTER — PES CALL (OUTPATIENT)
Dept: ADMINISTRATIVE | Facility: OTHER | Age: 62
End: 2022-12-16
Payer: MEDICARE

## 2023-02-09 ENCOUNTER — PES CALL (OUTPATIENT)
Dept: ADMINISTRATIVE | Facility: CLINIC | Age: 63
End: 2023-02-09
Payer: MEDICARE

## 2023-03-01 ENCOUNTER — LAB VISIT (OUTPATIENT)
Dept: LAB | Facility: HOSPITAL | Age: 63
End: 2023-03-01
Attending: INTERNAL MEDICINE
Payer: MEDICARE

## 2023-03-01 DIAGNOSIS — I10 ESSENTIAL HYPERTENSION: ICD-10-CM

## 2023-03-01 DIAGNOSIS — R73.03 PRE-DIABETES: ICD-10-CM

## 2023-03-01 DIAGNOSIS — E78.5 HYPERLIPIDEMIA, UNSPECIFIED HYPERLIPIDEMIA TYPE: ICD-10-CM

## 2023-03-01 LAB
ALBUMIN SERPL BCP-MCNC: 4.5 G/DL (ref 3.5–5.2)
ALP SERPL-CCNC: 78 U/L (ref 55–135)
ALT SERPL W/O P-5'-P-CCNC: 28 U/L (ref 10–44)
ANION GAP SERPL CALC-SCNC: 8 MMOL/L (ref 8–16)
AST SERPL-CCNC: 29 U/L (ref 10–40)
BILIRUB SERPL-MCNC: 0.4 MG/DL (ref 0.1–1)
BUN SERPL-MCNC: 13 MG/DL (ref 8–23)
CALCIUM SERPL-MCNC: 10.4 MG/DL (ref 8.7–10.5)
CHLORIDE SERPL-SCNC: 104 MMOL/L (ref 95–110)
CHOLEST SERPL-MCNC: 176 MG/DL (ref 120–199)
CHOLEST/HDLC SERPL: 2.7 {RATIO} (ref 2–5)
CO2 SERPL-SCNC: 29 MMOL/L (ref 23–29)
CREAT SERPL-MCNC: 0.9 MG/DL (ref 0.5–1.4)
EST. GFR  (NO RACE VARIABLE): >60 ML/MIN/1.73 M^2
ESTIMATED AVG GLUCOSE: 117 MG/DL (ref 68–131)
GLUCOSE SERPL-MCNC: 111 MG/DL (ref 70–110)
HBA1C MFR BLD: 5.7 % (ref 4–5.6)
HDLC SERPL-MCNC: 66 MG/DL (ref 40–75)
HDLC SERPL: 37.5 % (ref 20–50)
LDLC SERPL CALC-MCNC: 78.4 MG/DL (ref 63–159)
NONHDLC SERPL-MCNC: 110 MG/DL
POTASSIUM SERPL-SCNC: 3.9 MMOL/L (ref 3.5–5.1)
PROT SERPL-MCNC: 7.6 G/DL (ref 6–8.4)
SODIUM SERPL-SCNC: 141 MMOL/L (ref 136–145)
TRIGL SERPL-MCNC: 158 MG/DL (ref 30–150)

## 2023-03-01 PROCEDURE — 36415 COLL VENOUS BLD VENIPUNCTURE: CPT | Mod: PO | Performed by: INTERNAL MEDICINE

## 2023-03-01 PROCEDURE — 80061 LIPID PANEL: CPT | Performed by: INTERNAL MEDICINE

## 2023-03-01 PROCEDURE — 83036 HEMOGLOBIN GLYCOSYLATED A1C: CPT | Performed by: INTERNAL MEDICINE

## 2023-03-01 PROCEDURE — 80053 COMPREHEN METABOLIC PANEL: CPT | Performed by: INTERNAL MEDICINE

## 2023-03-05 NOTE — PROGRESS NOTES
62 y.o. femalepresents in f/u to pre-diabetes, hypertension, and dyslipidemia    Pre-DM tx w/diet  Denied increased thirst, urination, or hypoglycemia episodes  A1C ==>    Lab Results   Component Value Date    HGBA1C 5.7 (H) 03/01/2023         HTN tx w/Dyazide and losartan 50mg  Denied HA or dizziness  BP today==>136/78  However patient reported blood pressures in the 140s at home, discussion that 130/80 or less is the goal.    Dyslipidemia tx w/fish oil and rosuvastatin 20mg  Aortic atherosclerosis, asymptomatic  Denied unusual muscle pain or chest pain  LDL==>  Lab Results   Component Value Date    CHOL 176 03/01/2023    CHOL 230 (H) 09/02/2022    CHOL 202 (H) 08/20/2021     Lab Results   Component Value Date    HDL 66 03/01/2023    HDL 65 09/02/2022    HDL 61 08/20/2021     Lab Results   Component Value Date    LDLCALC 78.4 03/01/2023    LDLCALC 123.8 09/02/2022    LDLCALC 88.8 08/20/2021     Lab Results   Component Value Date    TRIG 158 (H) 03/01/2023    TRIG 206 (H) 09/02/2022    TRIG 261 (H) 08/20/2021     Lab Results   Component Value Date    CHOLHDL 37.5 03/01/2023    CHOLHDL 28.3 09/02/2022    CHOLHDL 30.2 08/20/2021     Nonalcohol fatty liver disease  Weight-160  LFTs-normal    ROS:  Patient continues with occasional olfactory hallucinations with smelling of smoke but in the interim has lost her smell and taste  With taste being present only with exaggerated levels of salt, sweet, are very tart food, making cooking a challenge  Patient denied COVID although her  and other family members +  Reviewed ENT and Neurology notes related to these complaints  No fever, chills, or night sweats  No blurry vision or visual disturbance  No dysphagia or early satiety  No palpitations or tachycardia  No cough or wheezing  No GERD or abdominal pain  No numbness or focal deficits  ++ chronic LBP w/ radicular sx into RLE  Worse over the past 4 months  Pain quality:Throbbing into calf  Worse w/ extension  Pain level:  7-8/10  Tx: heat, Salon Hector, heat/cold pads  Tylenol Arthritis- no relief  Voltaren gel- no relief    Remainder of review negative except as previously noted    PAST MEDICAL HX: Reviewed  PAST SURGICAL HX: Reviewed  SOCIAL HX: Reviewed  FAMILY HX: Reviewed    PHYSICAL EXAM:  VS: As noted  GENERAL: WDWN, A&O, NAD, conversant and co-operative  EYES: Conj/lids unremarkable, sclera anicteric, PERRL, EOMI  NECK: Supple w/o lymphadenopathy or thyromegaly  RESPIRATORY: Efforts are unlabored. Lungs CTAP  CARDIOVASCULAR: Heart RRR. No carotid bruits noted. 1+ pedal pulses. No LE edema  GASTROINTESTINAL: BS+, soft , NT/ND, no HSM noted  MUSCULOSKELETAL: Gait normal. No CCE  Sine: tender over the lower thoracic and the lumbar spine  + SLR right w/ referred pain to the right w/ SLR of the Left  NEUROLOGIC: CARRILLO. No tremor noted  SKIN: Warm and dry    IMPRESSION:  HTN, stable continue losartan 50 mg q.day and Dyazide q.day  -patient to monitor blood pressure at home and advise if her readings are 140 or greater with consideration for increasing her losartan to 100 mg  HLD, stable continue rosuvastatin 20 mg q.day and fish oil  Aortic atherosclerosis, asymptomatic  Prediabetic, stable continue diabetic diet  NAFLD, encourage low-fat diet and weight loss  Hyposmia-anosmia, w/ rare olfactory hallucinations at this time  Ageusia, w/ taste for only very strong levels of food  Chronic low back pain with right radicular symptoms, history of compression fracture with tenderness over spine today    PLAN:  DEXA  Continue present medication  Monitor blood pressure and advise if readings are elevated home  Plan as noted above  L-spine x-ray  Consider PT  Consider pain management  Consider further imaging depending on findings on x-ray  Patient is limited as far as medications Tylenol is not working topical meds are not working and with her elevated blood pressure and her of GI problems nonsteroidals are to be avoided orally     >40'  minutes   was spent today on H/P, review of MR and MDM

## 2023-03-08 ENCOUNTER — OFFICE VISIT (OUTPATIENT)
Dept: INTERNAL MEDICINE | Facility: CLINIC | Age: 63
End: 2023-03-08
Payer: MEDICARE

## 2023-03-08 VITALS
WEIGHT: 160.25 LBS | RESPIRATION RATE: 16 BRPM | BODY MASS INDEX: 27.36 KG/M2 | OXYGEN SATURATION: 97 % | TEMPERATURE: 97 F | SYSTOLIC BLOOD PRESSURE: 136 MMHG | DIASTOLIC BLOOD PRESSURE: 78 MMHG | HEART RATE: 96 BPM | HEIGHT: 64 IN

## 2023-03-08 DIAGNOSIS — I70.0 AORTIC ATHEROSCLEROSIS: ICD-10-CM

## 2023-03-08 DIAGNOSIS — M54.16 LUMBAR BACK PAIN WITH RADICULOPATHY AFFECTING RIGHT LOWER EXTREMITY: ICD-10-CM

## 2023-03-08 DIAGNOSIS — R73.03 PRE-DIABETES: ICD-10-CM

## 2023-03-08 DIAGNOSIS — E78.5 HYPERLIPIDEMIA, UNSPECIFIED HYPERLIPIDEMIA TYPE: ICD-10-CM

## 2023-03-08 DIAGNOSIS — Z78.0 POSTMENOPAUSAL ESTROGEN DEFICIENCY: ICD-10-CM

## 2023-03-08 DIAGNOSIS — I10 ESSENTIAL HYPERTENSION: Primary | ICD-10-CM

## 2023-03-08 DIAGNOSIS — K76.0 NAFLD (NONALCOHOLIC FATTY LIVER DISEASE): ICD-10-CM

## 2023-03-08 DIAGNOSIS — R73.01 IMPAIRED FASTING GLUCOSE: ICD-10-CM

## 2023-03-08 PROCEDURE — 99999 PR PBB SHADOW E&M-EST. PATIENT-LVL V: ICD-10-PCS | Mod: PBBFAC,,, | Performed by: INTERNAL MEDICINE

## 2023-03-08 PROCEDURE — 3078F DIAST BP <80 MM HG: CPT | Mod: CPTII,S$GLB,, | Performed by: INTERNAL MEDICINE

## 2023-03-08 PROCEDURE — 3008F BODY MASS INDEX DOCD: CPT | Mod: CPTII,S$GLB,, | Performed by: INTERNAL MEDICINE

## 2023-03-08 PROCEDURE — 3044F HG A1C LEVEL LT 7.0%: CPT | Mod: CPTII,S$GLB,, | Performed by: INTERNAL MEDICINE

## 2023-03-08 PROCEDURE — 3044F PR MOST RECENT HEMOGLOBIN A1C LEVEL <7.0%: ICD-10-PCS | Mod: CPTII,S$GLB,, | Performed by: INTERNAL MEDICINE

## 2023-03-08 PROCEDURE — 3078F PR MOST RECENT DIASTOLIC BLOOD PRESSURE < 80 MM HG: ICD-10-PCS | Mod: CPTII,S$GLB,, | Performed by: INTERNAL MEDICINE

## 2023-03-08 PROCEDURE — 99999 PR PBB SHADOW E&M-EST. PATIENT-LVL V: CPT | Mod: PBBFAC,,, | Performed by: INTERNAL MEDICINE

## 2023-03-08 PROCEDURE — 1159F MED LIST DOCD IN RCRD: CPT | Mod: CPTII,S$GLB,, | Performed by: INTERNAL MEDICINE

## 2023-03-08 PROCEDURE — 3075F PR MOST RECENT SYSTOLIC BLOOD PRESS GE 130-139MM HG: ICD-10-PCS | Mod: CPTII,S$GLB,, | Performed by: INTERNAL MEDICINE

## 2023-03-08 PROCEDURE — 4010F PR ACE/ARB THEARPY RXD/TAKEN: ICD-10-PCS | Mod: CPTII,S$GLB,, | Performed by: INTERNAL MEDICINE

## 2023-03-08 PROCEDURE — 3008F PR BODY MASS INDEX (BMI) DOCUMENTED: ICD-10-PCS | Mod: CPTII,S$GLB,, | Performed by: INTERNAL MEDICINE

## 2023-03-08 PROCEDURE — 3075F SYST BP GE 130 - 139MM HG: CPT | Mod: CPTII,S$GLB,, | Performed by: INTERNAL MEDICINE

## 2023-03-08 PROCEDURE — 99215 PR OFFICE/OUTPT VISIT, EST, LEVL V, 40-54 MIN: ICD-10-PCS | Mod: S$GLB,,, | Performed by: INTERNAL MEDICINE

## 2023-03-08 PROCEDURE — 4010F ACE/ARB THERAPY RXD/TAKEN: CPT | Mod: CPTII,S$GLB,, | Performed by: INTERNAL MEDICINE

## 2023-03-08 PROCEDURE — 1159F PR MEDICATION LIST DOCUMENTED IN MEDICAL RECORD: ICD-10-PCS | Mod: CPTII,S$GLB,, | Performed by: INTERNAL MEDICINE

## 2023-03-08 PROCEDURE — 99215 OFFICE O/P EST HI 40 MIN: CPT | Mod: S$GLB,,, | Performed by: INTERNAL MEDICINE

## 2023-03-08 PROCEDURE — 1160F PR REVIEW ALL MEDS BY PRESCRIBER/CLIN PHARMACIST DOCUMENTED: ICD-10-PCS | Mod: CPTII,S$GLB,, | Performed by: INTERNAL MEDICINE

## 2023-03-08 PROCEDURE — 1160F RVW MEDS BY RX/DR IN RCRD: CPT | Mod: CPTII,S$GLB,, | Performed by: INTERNAL MEDICINE

## 2023-03-08 PROCEDURE — 99499 UNLISTED E&M SERVICE: CPT | Mod: S$GLB,,, | Performed by: INTERNAL MEDICINE

## 2023-03-08 PROCEDURE — 99499 RISK ADDL DX/OHS AUDIT: ICD-10-PCS | Mod: S$GLB,,, | Performed by: INTERNAL MEDICINE

## 2023-03-14 ENCOUNTER — HOSPITAL ENCOUNTER (OUTPATIENT)
Dept: RADIOLOGY | Facility: HOSPITAL | Age: 63
Discharge: HOME | End: 2023-03-14
Attending: INTERNAL MEDICINE
Payer: MEDICARE

## 2023-03-14 DIAGNOSIS — M54.16 LUMBAR BACK PAIN WITH RADICULOPATHY AFFECTING RIGHT LOWER EXTREMITY: ICD-10-CM

## 2023-03-14 PROCEDURE — 72110 X-RAY EXAM L-2 SPINE 4/>VWS: CPT | Mod: TC,PO

## 2023-03-14 PROCEDURE — 72110 X-RAY EXAM L-2 SPINE 4/>VWS: CPT | Mod: 26,,, | Performed by: RADIOLOGY

## 2023-03-14 PROCEDURE — 72110 XR LUMBAR SPINE COMPLETE 5 VIEW: ICD-10-PCS | Mod: 26,,, | Performed by: RADIOLOGY

## 2023-03-16 ENCOUNTER — TELEPHONE (OUTPATIENT)
Dept: INTERNAL MEDICINE | Facility: CLINIC | Age: 63
End: 2023-03-16
Payer: MEDICARE

## 2023-03-16 NOTE — TELEPHONE ENCOUNTER
----- Message from Cristal Enriquez MD sent at 3/16/2023  2:51 PM CDT -----  Your spine xray revealed arthritis, and misalignment of the vertebra.  Physical therapy is often helpful to strengthen the back muscles in this setting,   please advise if you are agreeable.  jay jay

## 2023-03-16 NOTE — TELEPHONE ENCOUNTER
----- Message from Cristal Enriquez MD sent at 3/16/2023  3:01 PM CDT -----  Your bone density has improved with recommendation for a Prolia holiday.  Please contact your Rheumatologist or if you need an updated referral advise  So you can review your treatment options.  jay jay

## 2023-03-28 ENCOUNTER — PES CALL (OUTPATIENT)
Dept: ADMINISTRATIVE | Facility: CLINIC | Age: 63
End: 2023-03-28
Payer: MEDICARE

## 2023-04-14 ENCOUNTER — PES CALL (OUTPATIENT)
Dept: ADMINISTRATIVE | Facility: CLINIC | Age: 63
End: 2023-04-14
Payer: MEDICARE

## 2023-04-21 ENCOUNTER — PATIENT MESSAGE (OUTPATIENT)
Dept: ADMINISTRATIVE | Facility: HOSPITAL | Age: 63
End: 2023-04-21
Payer: MEDICARE

## 2023-05-03 ENCOUNTER — PES CALL (OUTPATIENT)
Dept: ADMINISTRATIVE | Facility: CLINIC | Age: 63
End: 2023-05-03
Payer: MEDICARE

## 2023-05-11 ENCOUNTER — PATIENT MESSAGE (OUTPATIENT)
Dept: INTERNAL MEDICINE | Facility: CLINIC | Age: 63
End: 2023-05-11
Payer: MEDICARE

## 2023-05-11 NOTE — TELEPHONE ENCOUNTER
1.Bone density report revealed improvement w/ recommendation to take a Prolia holiday and start a weekly  oral medication, alendronate  That is taken once a week w/ 8 oz water and requires pt to remain upright for one hour    The recommendation for the calcium was based on her hx of kidney stones, if she is having any difficulty w/ stones then she may d/c her calcium tablets and use her diet to obtain calcium  If she is     ( She has seen Rheumatology, and think they likely ordered the Prolia-( as I do not order  Prolia) she may certainly f/up w/ them prior to making a decision about the changes recommended by the Rheumatology specialist that read her DEXA, please see my portal note to her w/ the same recs )    2.Her lab revealed a pre-diabetic picture w/ elevated FBS and elevated A1C  O/w her chemistries - including her calcium were in the normal range  She needs to follow a diabetic diet- avoid sugar and simple carbohydrates    3.Her cholesterol had improved, and rec she continue to take the rosuvastatin 20mg qd    4. Sent pt a portal message w/ her back xrays report recommending PT, if she has had PT and has not had any improvement, then a Consult to Pain Management would be the next step

## 2023-05-27 ENCOUNTER — PATIENT MESSAGE (OUTPATIENT)
Dept: INTERNAL MEDICINE | Facility: CLINIC | Age: 63
End: 2023-05-27
Payer: MEDICARE

## 2023-05-28 RX ORDER — ALENDRONATE SODIUM 70 MG/1
70 TABLET ORAL
Qty: 12 TABLET | Refills: 3 | Status: SHIPPED | OUTPATIENT
Start: 2023-05-28 | End: 2024-05-27

## 2023-06-19 DIAGNOSIS — M79.642 LEFT HAND PAIN: Primary | ICD-10-CM

## 2023-06-28 ENCOUNTER — OFFICE VISIT (OUTPATIENT)
Dept: ORTHOPEDICS | Facility: CLINIC | Age: 63
End: 2023-06-28
Payer: MEDICARE

## 2023-06-28 ENCOUNTER — HOSPITAL ENCOUNTER (OUTPATIENT)
Dept: RADIOLOGY | Facility: OTHER | Age: 63
Discharge: HOME | End: 2023-06-28
Attending: ORTHOPAEDIC SURGERY
Payer: MEDICARE

## 2023-06-28 VITALS — WEIGHT: 160 LBS | HEIGHT: 64 IN | BODY MASS INDEX: 27.31 KG/M2

## 2023-06-28 DIAGNOSIS — M65.4 DE QUERVAIN'S TENOSYNOVITIS, LEFT: ICD-10-CM

## 2023-06-28 DIAGNOSIS — M18.12 ARTHRITIS OF CARPOMETACARPAL (CMC) JOINT OF LEFT THUMB: Primary | ICD-10-CM

## 2023-06-28 DIAGNOSIS — M79.642 LEFT HAND PAIN: ICD-10-CM

## 2023-06-28 PROCEDURE — 99999 PR PBB SHADOW E&M-EST. PATIENT-LVL III: CPT | Mod: PBBFAC,,, | Performed by: PHYSICIAN ASSISTANT

## 2023-06-28 PROCEDURE — 3044F PR MOST RECENT HEMOGLOBIN A1C LEVEL <7.0%: ICD-10-PCS | Mod: CPTII,S$GLB,, | Performed by: PHYSICIAN ASSISTANT

## 2023-06-28 PROCEDURE — 99999 PR PBB SHADOW E&M-EST. PATIENT-LVL III: ICD-10-PCS | Mod: PBBFAC,,, | Performed by: PHYSICIAN ASSISTANT

## 2023-06-28 PROCEDURE — 20550 PR INJECT TENDON SHEATH/LIGAMENT: ICD-10-PCS | Mod: 59,LT,S$GLB, | Performed by: PHYSICIAN ASSISTANT

## 2023-06-28 PROCEDURE — 99214 OFFICE O/P EST MOD 30 MIN: CPT | Mod: 25,S$GLB,, | Performed by: PHYSICIAN ASSISTANT

## 2023-06-28 PROCEDURE — 1159F MED LIST DOCD IN RCRD: CPT | Mod: CPTII,S$GLB,, | Performed by: PHYSICIAN ASSISTANT

## 2023-06-28 PROCEDURE — 20600 PR DRAIN/INJECT SMALL JOINT/BURSA: ICD-10-PCS | Mod: 51,LT,S$GLB, | Performed by: PHYSICIAN ASSISTANT

## 2023-06-28 PROCEDURE — 3008F PR BODY MASS INDEX (BMI) DOCUMENTED: ICD-10-PCS | Mod: CPTII,S$GLB,, | Performed by: PHYSICIAN ASSISTANT

## 2023-06-28 PROCEDURE — 20600 DRAIN/INJ JOINT/BURSA W/O US: CPT | Mod: 51,LT,S$GLB, | Performed by: PHYSICIAN ASSISTANT

## 2023-06-28 PROCEDURE — 20550 NJX 1 TENDON SHEATH/LIGAMENT: CPT | Mod: 59,LT,S$GLB, | Performed by: PHYSICIAN ASSISTANT

## 2023-06-28 PROCEDURE — 3044F HG A1C LEVEL LT 7.0%: CPT | Mod: CPTII,S$GLB,, | Performed by: PHYSICIAN ASSISTANT

## 2023-06-28 PROCEDURE — 73130 X-RAY EXAM OF HAND: CPT | Mod: TC,FY,LT

## 2023-06-28 PROCEDURE — 1159F PR MEDICATION LIST DOCUMENTED IN MEDICAL RECORD: ICD-10-PCS | Mod: CPTII,S$GLB,, | Performed by: PHYSICIAN ASSISTANT

## 2023-06-28 PROCEDURE — 99214 PR OFFICE/OUTPT VISIT, EST, LEVL IV, 30-39 MIN: ICD-10-PCS | Mod: 25,S$GLB,, | Performed by: PHYSICIAN ASSISTANT

## 2023-06-28 PROCEDURE — 3008F BODY MASS INDEX DOCD: CPT | Mod: CPTII,S$GLB,, | Performed by: PHYSICIAN ASSISTANT

## 2023-06-28 PROCEDURE — 73130 X-RAY EXAM OF HAND: CPT | Mod: 26,LT,, | Performed by: STUDENT IN AN ORGANIZED HEALTH CARE EDUCATION/TRAINING PROGRAM

## 2023-06-28 PROCEDURE — 4010F ACE/ARB THERAPY RXD/TAKEN: CPT | Mod: CPTII,S$GLB,, | Performed by: PHYSICIAN ASSISTANT

## 2023-06-28 PROCEDURE — 73130 XR HAND COMPLETE 3 VIEW LEFT: ICD-10-PCS | Mod: 26,LT,, | Performed by: STUDENT IN AN ORGANIZED HEALTH CARE EDUCATION/TRAINING PROGRAM

## 2023-06-28 PROCEDURE — 4010F PR ACE/ARB THEARPY RXD/TAKEN: ICD-10-PCS | Mod: CPTII,S$GLB,, | Performed by: PHYSICIAN ASSISTANT

## 2023-06-28 RX ORDER — DEXAMETHASONE SODIUM PHOSPHATE 4 MG/ML
4 INJECTION, SOLUTION INTRA-ARTICULAR; INTRALESIONAL; INTRAMUSCULAR; INTRAVENOUS; SOFT TISSUE
Status: COMPLETED | OUTPATIENT
Start: 2023-06-28 | End: 2023-06-28

## 2023-06-28 RX ORDER — LIDOCAINE HYDROCHLORIDE 10 MG/ML
1 INJECTION, SOLUTION EPIDURAL; INFILTRATION; INTRACAUDAL; PERINEURAL ONCE
Status: COMPLETED | OUTPATIENT
Start: 2023-06-28 | End: 2023-06-28

## 2023-06-28 RX ORDER — LIDOCAINE HYDROCHLORIDE 10 MG/ML
0.5 INJECTION, SOLUTION EPIDURAL; INFILTRATION; INTRACAUDAL; PERINEURAL ONCE
Status: COMPLETED | OUTPATIENT
Start: 2023-06-28 | End: 2023-06-28

## 2023-06-28 RX ADMIN — LIDOCAINE HYDROCHLORIDE 5 MG: 10 INJECTION, SOLUTION EPIDURAL; INFILTRATION; INTRACAUDAL; PERINEURAL at 03:06

## 2023-06-28 RX ADMIN — DEXAMETHASONE SODIUM PHOSPHATE 4 MG: 4 INJECTION, SOLUTION INTRA-ARTICULAR; INTRALESIONAL; INTRAMUSCULAR; INTRAVENOUS; SOFT TISSUE at 02:06

## 2023-06-28 RX ADMIN — LIDOCAINE HYDROCHLORIDE 10 MG: 10 INJECTION, SOLUTION EPIDURAL; INFILTRATION; INTRACAUDAL; PERINEURAL at 03:06

## 2023-06-28 NOTE — PROGRESS NOTES
Subjective:      Patient ID: Ivelisse Hay is a 62 y.o. female.    Chief Complaint: Pain of the Left Hand      HPI  Ivelisse Hay is a right hand dominant 62 y.o. female presenting today for evaluation of the left hand. She has known thumb CMC arthritis and has received injections in the past. She would like another injection today she has had increased pain recently. She also has pain at the radial styloid. She has a grandchild she lifts regularly. She has been using a smaller thumb spica brace. History of bilateral CTS releases with Dr. Ortiz in approximately 2012    Review of patient's allergies indicates:  No Known Allergies      Current Outpatient Medications   Medication Sig Dispense Refill    alendronate (FOSAMAX) 70 MG tablet Take 1 tablet (70 mg total) by mouth every 7 days. Take first morning w/ 8 oz water and remain upright x one hour 12 tablet 3    aspirin (ECOTRIN) 81 MG EC tablet Take 81 mg by mouth once daily.      fish oil-omega-3 fatty acids 300-1,000 mg capsule Take 2 g by mouth once daily.      fluticasone propionate (FLONASE) 50 mcg/actuation nasal spray       losartan (COZAAR) 50 MG tablet TAKE ONE TABLET BY MOUTH EVERY DAY 90 tablet 3    magnesium 30 mg Tab Take by mouth.      multivitamin capsule Take 1 capsule by mouth once daily.      rosuvastatin (CRESTOR) 20 MG tablet Take 1 tablet (20 mg total) by mouth once daily. 90 tablet 3    triamterene-hydrochlorothiazide 37.5-25 mg (DYAZIDE) 37.5-25 mg per capsule TAKE ONE CAPSULE BY MOUTH EVERY DAY 90 capsule 3    Lactobacillus rhamnosus GG (CULTURELLE) 10 billion cell capsule Take 1 capsule by mouth once daily.       Current Facility-Administered Medications   Medication Dose Route Frequency Provider Last Rate Last Admin    lidocaine-EPINEPHrine 1%-1:100,000 30 mL, lidocaine HCL 10 mg/ml (1%) 20 mL, sodium bicarbonate 1 mEq/mL (8.4 %) 10 mL in sodium chloride 0.9% 500 mL solution   MISCELLANEOUS 1 time in Clinic/HOD Raad ROSS  "MD Jennifer           Past Medical History:   Diagnosis Date    Diarrhea     Gastritis     GERD (gastroesophageal reflux disease)     HLD (hyperlipidemia)     Hypertension        Past Surgical History:   Procedure Laterality Date    CYSTOSCOPY      NEPHROSTOMY TUBE      CYSTOSCOPY WITH STENT    SHOULDER SURGERY  9-17-13    right    TOTAL REDUCTION MAMMOPLASTY Bilateral 2018    URETERAL STENT PLACEMENT      THEN REMOVED       Review of Systems:  Constitutional: Negative for chills and fever.   Respiratory: Negative for cough and shortness of breath.    Gastrointestinal: Negative for nausea and vomiting.   Skin: Negative for rash.   Neurological: Negative for dizziness and headaches.   Psychiatric/Behavioral: Negative for depression.   MSK as in HPI       OBJECTIVE:     PHYSICAL EXAM:  Ht 5' 4" (1.626 m)   Wt 72.6 kg (160 lb)   BMI 27.46 kg/m²     GEN:  NAD, well-developed, well-groomed.  NEURO: Awake, alert, and oriented. Normal attention and concentration.    PSYCH: Normal mood and affect. Behavior is normal.  HEENT: No cervical lymphadenopathy noted.  CARDIOVASCULAR: Radial pulses 2+ bilaterally. No LE edema noted.  PULMONARY: Breath sounds normal. No respiratory distress.  SKIN: Intact, no rashes.      MSK:   LUE:  Good active ROM of the wrist and fingers. Ttp thumb CMC, radial styloid. AIN/PIN/Radial/Median/Ulnar Nerves assessed in isolation without deficit. Radial & Ulnar arteries palpated 2+. Capillary Refill <3s.      RADIOGRAPHS:  Left hand 6/28/23   FINDINGS:  No acute fracture, dislocation, or osseous destruction.  Degenerative changes of the 1st carpometacarpal joint.  Comments: I have personally reviewed the imaging and I agree with the above radiologist's report.    ASSESSMENT/PLAN:       ICD-10-CM ICD-9-CM   1. Arthritis of carpometacarpal (CMC) joint of left thumb  M18.12 716.94   2. De Quervain's tenosynovitis, left  M65.4 727.04       Orders Placed This Encounter    LIDOcaine (PF) 10 mg/ml (1%) " injection 10 mg    dexAMETHasone injection 4 mg    LIDOcaine (PF) 10 mg/ml (1%) injection 5 mg    dexAMETHasone injection 4 mg     No orders of the defined types were placed in this encounter.      Plan:   Treatment options discussed plan for left thumb CMC and left first dorsal compartment injections today   She will hold off on therapy at this time   RTC as needed if symptoms persist         PROCEDURE:  I have explained the risks, benefits, and alternatives of the procedure in detail.  The patient voices understanding and all questions have been answered. Fluoroscopy used. The patient agrees to proceed as planned. So after a sterile prep of the skin in the normal fashion the left thumb CMC is injected using a 25 gauge needle with a combination of 0.5cc 1% plain lidocaine and 4 mg of dexamethasone.  The patient is cautioned and immediate relief of pain is secondary to the local anesthetic and will be temporary.  After the anesthetic wears off there may be a increase in pain that may last for a few hours or a few days and they should use ice to help alleviate this flair up of pain. Patient tolerated the procedure well.       PROCEDURE:  I have explained the risks, benefits, and alternatives of the procedure in detail.  The patient voices understanding and all questions have been answered. US used. The patient agrees to proceed as planned. So after a sterile prep of the skin in the normal fashion the left first dorsal compartment is injected using a 25 gauge needle with a combination of 1cc 1% plain lidocaine and 4 mg of dexamethasone.  The patient is cautioned and immediate relief of pain is secondary to the local anesthetic and will be temporary.  After the anesthetic wears off there may be a increase in pain that may last for a few hours or a few days and they should use ice to help alleviate this flair up of pain. Patient tolerated the procedure well.     The patient indicates understanding of these issues and  agrees to the plan.    Elise Ron PA-C  [facility]   [facility] [place], [place]

## 2023-07-06 ENCOUNTER — PES CALL (OUTPATIENT)
Dept: ADMINISTRATIVE | Facility: CLINIC | Age: 63
End: 2023-07-06
Payer: MEDICARE

## 2023-08-28 ENCOUNTER — PATIENT OUTREACH (OUTPATIENT)
Dept: ADMINISTRATIVE | Facility: HOSPITAL | Age: 63
End: 2023-08-28
Payer: MEDICARE

## 2023-08-28 DIAGNOSIS — Z12.31 ENCOUNTER FOR SCREENING MAMMOGRAM FOR MALIGNANT NEOPLASM OF BREAST: Primary | ICD-10-CM

## 2023-08-28 NOTE — PROGRESS NOTES
Health Maintenance Due   Topic Date Due    HIV Screening  Never done    Colorectal Cancer Screening  09/25/2019    COVID-19 Vaccine (6 - Moderna series) 05/29/2022    Cervical Cancer Screening  05/22/2023    Mammogram  10/14/2023     Chart reviewed.   Immunizations: Reconciled  Orders placed: Mammogram  Upcoming appts to satisfy ALFREDO topics: N/A

## 2023-09-05 ENCOUNTER — LAB VISIT (OUTPATIENT)
Dept: LAB | Facility: HOSPITAL | Age: 63
End: 2023-09-05
Attending: INTERNAL MEDICINE
Payer: MEDICARE

## 2023-09-05 ENCOUNTER — TELEPHONE (OUTPATIENT)
Dept: INTERNAL MEDICINE | Facility: CLINIC | Age: 63
End: 2023-09-05
Payer: MEDICARE

## 2023-09-05 DIAGNOSIS — R73.03 PRE-DIABETES: ICD-10-CM

## 2023-09-05 LAB
ALBUMIN/CREAT UR: NORMAL UG/MG (ref 0–30)
CREAT UR-MCNC: 55 MG/DL (ref 15–325)
MICROALBUMIN UR DL<=1MG/L-MCNC: <5 UG/ML

## 2023-09-05 PROCEDURE — 82570 ASSAY OF URINE CREATININE: CPT | Performed by: INTERNAL MEDICINE

## 2023-09-05 NOTE — TELEPHONE ENCOUNTER
----- Message from Cristal Enriquez MD sent at 9/5/2023  3:48 PM CDT -----  Please review your lab work and we will discuss at your pending office visit.   jay jay

## 2023-09-09 NOTE — PROGRESS NOTES
64 yo female presents for f/up chronic medical problems    Pre-DM tx w/diet  Denied increased thirst, urination, or hypoglycemia episodes  A1C ==> 5.8             HTN tx w/Dyazide and losartan 50mg  Denied HA or dizziness  BP today==>136/78  However patient reported blood pressures in the 140s at home, discussion that 130/80 or less is the goal.     Dyslipidemia tx w/fish oil and rosuvastatin 20mg  Aortic atherosclerosis, asymptomatic  Denied unusual muscle pain or chest pain  LDL==>70.6  TG's==> 252    Pain with right lower extremity radicular symptoms intermittent  More of a throb then a burning or tingling pain  Patient had tried physical therapy unsure if she wants to revisit  L-spine 3/2023   FINDINGS:  Vertebral bodies are mildly demineralized but intact.  No collapse or destruction is noted.  There is mild forward subluxation of L4 on L5 and the L5-S1 disc space is mildly narrowed.  Degenerative changes are noted with no bony destruction.       Nonsmoker  Social ETOH    HEALTH MAINT:  Chol/lab, reviewed-  C-scope,9/2012  Impression: - normal,  7 years   EGD, 5/2017-  Impression:           - Hiatal hernia.                        - Gastritis. Biopsied.                        - Normal examined duodenum. B  WWE, 2020, pap- normal, asx  Mammo, pending   BMD, pt not ready to update at this time  EYE exam, pending  DDS exam,UTD    VACCINATIONS:  Tdap, 12/31/2013  Flu, yearly    MedCard reviewed.   REVIEW OF SYSTEMS:   CONSTITUTIONAL: No fever, no chills, no night sweats.   EYES: No double vision or itchy watery eyes.   No focal deficits or neurologic sx  No left ear/jaw discomfort ;  ENT: Loss of smell and taste , w/up w/ ENT and Neurology unrevealing  CARDIOVASCULAR: No angina or palpitations.   No claudication w/ exercise-walking  RESPIRATORY: No cough or wheezing.   GI: Occ. indigestion or heartburn.   No blood in her stool or urine.    NEUROLOGICAL: No unusual headaches. No focal deficits.   Olfactory  hallucinations- head CT unremarkable  MS: see above LBP w/ radicular sx  SKIN: no new lesions  ADL;s: 100% independent  AD's: to consider  Memory: delayed recall  Mental status: happy, taking care of 2 grandchildren  Remainder of review negative except as previously noted.         PHYSICAL EXAM:   VS: stable  GENERAL: She is alert and oriented in no acute distress. WDWN, conversant and cooperative. Pleasant pt  EYES: Conjunctivae and lids are okay.   Sclera anicteric Pupils are reactive.   ENT: Hearing intact  NECK: Supple. No thyromegaly or lymphadenopathy noted  RESPIRATORY: Efforts are unlabored.   LUNGS: Clear to auscultation.   HEART: Regular rate and rhythm. No carotid bruits,   1+ pedal pulses, no edema.   ABDOMEN: Bowel sounds present, soft, and nontender. No hepatosplenomegaly.  MS: Gait nl, No CCE  NEURO: CARRILLO. No tremor noted  SKIN: Warm and dry      IMPRESSION/PLAN:  HTN, stable  -continue losartan 50 mg q.day  -continue low-salt diet  HLD.  Stable, LDL 70.6  -continue rosuvastatin 20 mg q.day   -continue low-fat diet  -START LOVAZA 2 BID  -recheck lipids and AST/ALT in 3 mos  Aortic atherosclerosis, asx  IFBS/ Pre- DM, stable, A1c 5.8  -continue diabetic diet  NAFLD, stable  -continue low-fat diet  LBP w/ right radicular sx  - consider PT  - consider PM  -consider gabapentin  Joint pain , multisite, synovitis metacarpals, s/p injections    Osteoporosis, on Ca and Vitamin D -     -mammo  -c-scope  -RTC 6 mos       >40'  minutes  was spent today on H/P, review of MR and MDM

## 2023-09-11 ENCOUNTER — PATIENT MESSAGE (OUTPATIENT)
Dept: INTERNAL MEDICINE | Facility: CLINIC | Age: 63
End: 2023-09-11

## 2023-09-11 ENCOUNTER — OFFICE VISIT (OUTPATIENT)
Dept: INTERNAL MEDICINE | Facility: CLINIC | Age: 63
End: 2023-09-11
Payer: MEDICARE

## 2023-09-11 VITALS
WEIGHT: 163.81 LBS | TEMPERATURE: 98 F | BODY MASS INDEX: 27.96 KG/M2 | HEART RATE: 81 BPM | HEIGHT: 64 IN | RESPIRATION RATE: 20 BRPM | SYSTOLIC BLOOD PRESSURE: 136 MMHG | DIASTOLIC BLOOD PRESSURE: 78 MMHG | OXYGEN SATURATION: 98 %

## 2023-09-11 DIAGNOSIS — K76.0 NAFLD (NONALCOHOLIC FATTY LIVER DISEASE): ICD-10-CM

## 2023-09-11 DIAGNOSIS — I70.0 AORTIC ATHEROSCLEROSIS: ICD-10-CM

## 2023-09-11 DIAGNOSIS — R73.03 PRE-DIABETES: ICD-10-CM

## 2023-09-11 DIAGNOSIS — I10 ESSENTIAL HYPERTENSION: Primary | ICD-10-CM

## 2023-09-11 DIAGNOSIS — R73.01 IMPAIRED FASTING GLUCOSE: ICD-10-CM

## 2023-09-11 DIAGNOSIS — M54.16 LUMBAR BACK PAIN WITH RADICULOPATHY AFFECTING RIGHT LOWER EXTREMITY: ICD-10-CM

## 2023-09-11 DIAGNOSIS — E78.5 HYPERLIPIDEMIA, UNSPECIFIED HYPERLIPIDEMIA TYPE: ICD-10-CM

## 2023-09-11 DIAGNOSIS — Z12.11 COLON CANCER SCREENING: ICD-10-CM

## 2023-09-11 PROCEDURE — 3066F PR DOCUMENTATION OF TREATMENT FOR NEPHROPATHY: ICD-10-PCS | Mod: CPTII,S$GLB,, | Performed by: INTERNAL MEDICINE

## 2023-09-11 PROCEDURE — 3075F SYST BP GE 130 - 139MM HG: CPT | Mod: CPTII,S$GLB,, | Performed by: INTERNAL MEDICINE

## 2023-09-11 PROCEDURE — 99999 PR PBB SHADOW E&M-EST. PATIENT-LVL V: CPT | Mod: PBBFAC,,, | Performed by: INTERNAL MEDICINE

## 2023-09-11 PROCEDURE — 4010F ACE/ARB THERAPY RXD/TAKEN: CPT | Mod: CPTII,S$GLB,, | Performed by: INTERNAL MEDICINE

## 2023-09-11 PROCEDURE — 1159F MED LIST DOCD IN RCRD: CPT | Mod: CPTII,S$GLB,, | Performed by: INTERNAL MEDICINE

## 2023-09-11 PROCEDURE — 4010F PR ACE/ARB THEARPY RXD/TAKEN: ICD-10-PCS | Mod: CPTII,S$GLB,, | Performed by: INTERNAL MEDICINE

## 2023-09-11 PROCEDURE — 99215 PR OFFICE/OUTPT VISIT, EST, LEVL V, 40-54 MIN: ICD-10-PCS | Mod: S$GLB,,, | Performed by: INTERNAL MEDICINE

## 2023-09-11 PROCEDURE — 99999 PR PBB SHADOW E&M-EST. PATIENT-LVL V: ICD-10-PCS | Mod: PBBFAC,,, | Performed by: INTERNAL MEDICINE

## 2023-09-11 PROCEDURE — 3008F PR BODY MASS INDEX (BMI) DOCUMENTED: ICD-10-PCS | Mod: CPTII,S$GLB,, | Performed by: INTERNAL MEDICINE

## 2023-09-11 PROCEDURE — 3078F PR MOST RECENT DIASTOLIC BLOOD PRESSURE < 80 MM HG: ICD-10-PCS | Mod: CPTII,S$GLB,, | Performed by: INTERNAL MEDICINE

## 2023-09-11 PROCEDURE — 3044F HG A1C LEVEL LT 7.0%: CPT | Mod: CPTII,S$GLB,, | Performed by: INTERNAL MEDICINE

## 2023-09-11 PROCEDURE — 3075F PR MOST RECENT SYSTOLIC BLOOD PRESS GE 130-139MM HG: ICD-10-PCS | Mod: CPTII,S$GLB,, | Performed by: INTERNAL MEDICINE

## 2023-09-11 PROCEDURE — 3066F NEPHROPATHY DOC TX: CPT | Mod: CPTII,S$GLB,, | Performed by: INTERNAL MEDICINE

## 2023-09-11 PROCEDURE — 3078F DIAST BP <80 MM HG: CPT | Mod: CPTII,S$GLB,, | Performed by: INTERNAL MEDICINE

## 2023-09-11 PROCEDURE — 3061F PR NEG MICROALBUMINURIA RESULT DOCUMENTED/REVIEW: ICD-10-PCS | Mod: CPTII,S$GLB,, | Performed by: INTERNAL MEDICINE

## 2023-09-11 PROCEDURE — 1159F PR MEDICATION LIST DOCUMENTED IN MEDICAL RECORD: ICD-10-PCS | Mod: CPTII,S$GLB,, | Performed by: INTERNAL MEDICINE

## 2023-09-11 PROCEDURE — 3061F NEG MICROALBUMINURIA REV: CPT | Mod: CPTII,S$GLB,, | Performed by: INTERNAL MEDICINE

## 2023-09-11 PROCEDURE — 3044F PR MOST RECENT HEMOGLOBIN A1C LEVEL <7.0%: ICD-10-PCS | Mod: CPTII,S$GLB,, | Performed by: INTERNAL MEDICINE

## 2023-09-11 PROCEDURE — 99215 OFFICE O/P EST HI 40 MIN: CPT | Mod: S$GLB,,, | Performed by: INTERNAL MEDICINE

## 2023-09-11 PROCEDURE — 3008F BODY MASS INDEX DOCD: CPT | Mod: CPTII,S$GLB,, | Performed by: INTERNAL MEDICINE

## 2023-09-11 RX ORDER — AMOXICILLIN 500 MG
CAPSULE ORAL
Start: 2023-09-11

## 2023-09-11 RX ORDER — OMEGA-3-ACID ETHYL ESTERS 1 G/1
2 CAPSULE, LIQUID FILLED ORAL 2 TIMES DAILY
Qty: 360 CAPSULE | Refills: 3 | Status: SHIPPED | OUTPATIENT
Start: 2023-09-11 | End: 2023-09-11 | Stop reason: CLARIF

## 2023-09-14 ENCOUNTER — TELEPHONE (OUTPATIENT)
Dept: GASTROENTEROLOGY | Facility: CLINIC | Age: 63
End: 2023-09-14
Payer: MEDICARE

## 2023-09-14 DIAGNOSIS — Z12.11 COLON CANCER SCREENING: Primary | ICD-10-CM

## 2023-09-14 NOTE — TELEPHONE ENCOUNTER
----- Message from Ronak Mishra sent at 9/14/2023  3:32 PM CDT -----  Regarding: Self  [phone]  Type: Patient Call Back    Who called: Self     What is the request in detail: pt called stating that she is wondering what type of procedures the doctor would be able to do. Pt would like information. Would like a call back.     Can the clinic reply by [facility]? No     Would the patient rather a call back or a response via [facility]? Call back     Best call back number: [phone]     Additional Information:    Thank you.

## 2023-09-14 NOTE — TELEPHONE ENCOUNTER
MA spoke with patient. Mrs. Hay is scheduled for a colonoscopy with Dr. Jo. Pharmacy confirmed and patient is aware instructions will be mailed. Patient is also aware GI nurse will call a few days prior to her appointment to review instructions and answered any questions she may have.     Clinic number provided.

## 2023-09-18 ENCOUNTER — TELEPHONE (OUTPATIENT)
Dept: ENDOSCOPY | Facility: HOSPITAL | Age: 63
End: 2023-09-18

## 2023-09-18 ENCOUNTER — PATIENT MESSAGE (OUTPATIENT)
Dept: ENDOSCOPY | Facility: HOSPITAL | Age: 63
End: 2023-09-18
Payer: MEDICARE

## 2023-09-18 VITALS — BODY MASS INDEX: 29.02 KG/M2 | WEIGHT: 170 LBS | HEIGHT: 64 IN

## 2023-09-18 DIAGNOSIS — Z12.11 COLON CANCER SCREENING: Primary | ICD-10-CM

## 2023-09-18 NOTE — TELEPHONE ENCOUNTER
Spoke to patient to schedule procedure(s) Colonoscopy       Physician to perform procedure(s) Dr. ITZEL Nix  Date of Procedure (s) 11/6/23  Arrival Time 12:15 PM  Time of Procedure(s) 1:15 PM   Location of Procedure(s) [facility]  Type of Rx Prep sent to patient: PEG  Instructions provided to [facility] via [facility]    Patient was informed on the following information and verbalized understanding. Screening questionnaire reviewed with patient and complete. If procedure requires anesthesia, a responsible adult needs to be present to accompany the patient home, patient cannot drive after receiving anesthesia. Appointment details are tentative, especially check-in time. Patient will receive a prep-op call 4 days prior to confirm check-in time for procedure. If applicable the patient should contact their pharmacy to verify Rx for procedure prep is ready for pick-up. Patient was advised to call the scheduling department at [phone] if pharmacy states no Rx is available. Patient was advised to call the endoscopy scheduling department if any questions or concerns arise.      [facility] Scheduling Department

## 2023-10-16 ENCOUNTER — HOSPITAL ENCOUNTER (OUTPATIENT)
Dept: RADIOLOGY | Facility: HOSPITAL | Age: 63
Discharge: HOME | End: 2023-10-16
Attending: INTERNAL MEDICINE
Payer: MEDICARE

## 2023-10-16 VITALS — WEIGHT: 170 LBS | HEIGHT: 64 IN | BODY MASS INDEX: 29.02 KG/M2

## 2023-10-16 DIAGNOSIS — Z12.31 ENCOUNTER FOR SCREENING MAMMOGRAM FOR MALIGNANT NEOPLASM OF BREAST: ICD-10-CM

## 2023-10-16 PROCEDURE — 77067 SCR MAMMO BI INCL CAD: CPT | Mod: TC,PO

## 2023-10-16 PROCEDURE — 77067 SCR MAMMO BI INCL CAD: CPT | Mod: 26,,, | Performed by: RADIOLOGY

## 2023-10-16 PROCEDURE — 77063 MAMMO DIGITAL SCREENING BILAT WITH TOMO: ICD-10-PCS | Mod: 26,,, | Performed by: RADIOLOGY

## 2023-10-16 PROCEDURE — 77063 BREAST TOMOSYNTHESIS BI: CPT | Mod: 26,,, | Performed by: RADIOLOGY

## 2023-10-16 PROCEDURE — 77067 MAMMO DIGITAL SCREENING BILAT WITH TOMO: ICD-10-PCS | Mod: 26,,, | Performed by: RADIOLOGY

## 2023-10-23 ENCOUNTER — TELEPHONE (OUTPATIENT)
Dept: INTERNAL MEDICINE | Facility: CLINIC | Age: 63
End: 2023-10-23
Payer: MEDICARE

## 2023-10-23 NOTE — TELEPHONE ENCOUNTER
----- Message from Cristal Enriquez MD sent at 10/21/2023  1:56 PM CDT -----  Please note that your mammogram was read as follows  Impression:  There is no mammographic evidence of malignancy.   Cristal Giordano
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
## 2023-10-31 ENCOUNTER — PATIENT MESSAGE (OUTPATIENT)
Dept: PHYSICAL MEDICINE AND REHAB | Facility: CLINIC | Age: 63
End: 2023-10-31

## 2023-10-31 ENCOUNTER — OFFICE VISIT (OUTPATIENT)
Dept: PHYSICAL MEDICINE AND REHAB | Facility: CLINIC | Age: 63
End: 2023-10-31
Payer: MEDICARE

## 2023-10-31 VITALS
WEIGHT: 169.06 LBS | HEART RATE: 79 BPM | HEIGHT: 64 IN | TEMPERATURE: 98 F | DIASTOLIC BLOOD PRESSURE: 86 MMHG | SYSTOLIC BLOOD PRESSURE: 171 MMHG | BODY MASS INDEX: 28.86 KG/M2

## 2023-10-31 DIAGNOSIS — M54.50 ACUTE LEFT-SIDED LOW BACK PAIN WITHOUT SCIATICA: ICD-10-CM

## 2023-10-31 DIAGNOSIS — M62.838 MUSCLE SPASM: Primary | ICD-10-CM

## 2023-10-31 DIAGNOSIS — S32.010S COMPRESSION FRACTURE OF L1 VERTEBRA, SEQUELA: ICD-10-CM

## 2023-10-31 PROCEDURE — 1159F MED LIST DOCD IN RCRD: CPT | Mod: CPTII,S$GLB,, | Performed by: PHYSICAL MEDICINE & REHABILITATION

## 2023-10-31 PROCEDURE — 99204 PR OFFICE/OUTPT VISIT, NEW, LEVL IV, 45-59 MIN: ICD-10-PCS | Mod: S$GLB,,, | Performed by: PHYSICAL MEDICINE & REHABILITATION

## 2023-10-31 PROCEDURE — 3066F NEPHROPATHY DOC TX: CPT | Mod: CPTII,S$GLB,, | Performed by: PHYSICAL MEDICINE & REHABILITATION

## 2023-10-31 PROCEDURE — 3061F NEG MICROALBUMINURIA REV: CPT | Mod: CPTII,S$GLB,, | Performed by: PHYSICAL MEDICINE & REHABILITATION

## 2023-10-31 PROCEDURE — 3066F PR DOCUMENTATION OF TREATMENT FOR NEPHROPATHY: ICD-10-PCS | Mod: CPTII,S$GLB,, | Performed by: PHYSICAL MEDICINE & REHABILITATION

## 2023-10-31 PROCEDURE — 3044F HG A1C LEVEL LT 7.0%: CPT | Mod: CPTII,S$GLB,, | Performed by: PHYSICAL MEDICINE & REHABILITATION

## 2023-10-31 PROCEDURE — 3044F PR MOST RECENT HEMOGLOBIN A1C LEVEL <7.0%: ICD-10-PCS | Mod: CPTII,S$GLB,, | Performed by: PHYSICAL MEDICINE & REHABILITATION

## 2023-10-31 PROCEDURE — 3077F PR MOST RECENT SYSTOLIC BLOOD PRESSURE >= 140 MM HG: ICD-10-PCS | Mod: CPTII,S$GLB,, | Performed by: PHYSICAL MEDICINE & REHABILITATION

## 2023-10-31 PROCEDURE — 1160F PR REVIEW ALL MEDS BY PRESCRIBER/CLIN PHARMACIST DOCUMENTED: ICD-10-PCS | Mod: CPTII,S$GLB,, | Performed by: PHYSICAL MEDICINE & REHABILITATION

## 2023-10-31 PROCEDURE — 3079F DIAST BP 80-89 MM HG: CPT | Mod: CPTII,S$GLB,, | Performed by: PHYSICAL MEDICINE & REHABILITATION

## 2023-10-31 PROCEDURE — 1159F PR MEDICATION LIST DOCUMENTED IN MEDICAL RECORD: ICD-10-PCS | Mod: CPTII,S$GLB,, | Performed by: PHYSICAL MEDICINE & REHABILITATION

## 2023-10-31 PROCEDURE — 3079F PR MOST RECENT DIASTOLIC BLOOD PRESSURE 80-89 MM HG: ICD-10-PCS | Mod: CPTII,S$GLB,, | Performed by: PHYSICAL MEDICINE & REHABILITATION

## 2023-10-31 PROCEDURE — 3008F PR BODY MASS INDEX (BMI) DOCUMENTED: ICD-10-PCS | Mod: CPTII,S$GLB,, | Performed by: PHYSICAL MEDICINE & REHABILITATION

## 2023-10-31 PROCEDURE — 3008F BODY MASS INDEX DOCD: CPT | Mod: CPTII,S$GLB,, | Performed by: PHYSICAL MEDICINE & REHABILITATION

## 2023-10-31 PROCEDURE — 99204 OFFICE O/P NEW MOD 45 MIN: CPT | Mod: S$GLB,,, | Performed by: PHYSICAL MEDICINE & REHABILITATION

## 2023-10-31 PROCEDURE — 99999 PR PBB SHADOW E&M-EST. PATIENT-LVL V: ICD-10-PCS | Mod: PBBFAC,,, | Performed by: PHYSICAL MEDICINE & REHABILITATION

## 2023-10-31 PROCEDURE — 4010F PR ACE/ARB THEARPY RXD/TAKEN: ICD-10-PCS | Mod: CPTII,S$GLB,, | Performed by: PHYSICAL MEDICINE & REHABILITATION

## 2023-10-31 PROCEDURE — 99999 PR PBB SHADOW E&M-EST. PATIENT-LVL V: CPT | Mod: PBBFAC,,, | Performed by: PHYSICAL MEDICINE & REHABILITATION

## 2023-10-31 PROCEDURE — 4010F ACE/ARB THERAPY RXD/TAKEN: CPT | Mod: CPTII,S$GLB,, | Performed by: PHYSICAL MEDICINE & REHABILITATION

## 2023-10-31 PROCEDURE — 1160F RVW MEDS BY RX/DR IN RCRD: CPT | Mod: CPTII,S$GLB,, | Performed by: PHYSICAL MEDICINE & REHABILITATION

## 2023-10-31 PROCEDURE — 3077F SYST BP >= 140 MM HG: CPT | Mod: CPTII,S$GLB,, | Performed by: PHYSICAL MEDICINE & REHABILITATION

## 2023-10-31 PROCEDURE — 3061F PR NEG MICROALBUMINURIA RESULT DOCUMENTED/REVIEW: ICD-10-PCS | Mod: CPTII,S$GLB,, | Performed by: PHYSICAL MEDICINE & REHABILITATION

## 2023-10-31 RX ORDER — TIZANIDINE 2 MG/1
2-4 TABLET ORAL EVERY 8 HOURS PRN
Qty: 90 TABLET | Refills: 2 | Status: SHIPPED | OUTPATIENT
Start: 2023-10-31 | End: 2024-03-25

## 2023-10-31 RX ORDER — DICLOFENAC SODIUM 75 MG/1
75 TABLET, DELAYED RELEASE ORAL 2 TIMES DAILY
Qty: 60 TABLET | Refills: 2 | Status: SHIPPED | OUTPATIENT
Start: 2023-10-31 | End: 2023-12-14

## 2023-10-31 NOTE — PATIENT INSTRUCTIONS
Patients Guide to Back Pain    Low back pain effects 2/3 of adults at some point in their lives.  It is one of the top 10 reasons to go to the doctor.  Back pain is scary, sudden, and painful but does usually improve.  It is usually benign and is not caused by a serious underlying disease.  95% of back pain is mechanical, meaning something makes it worse (most commonly bending and sitting).    If bending makes it worse, extension stretches can be helpful.    If standing makes it worse flexion, stretches and z-lie can be helpful.   You can find details on these stretches and more in Treat Your Own Back by Juan Patel.    Moving is the best medicine, even if it hurts. Bed rest is not recommended for back pain.  Early return to daily activities leads to less chronic disability.  Early involvement in Physical Therapy can decrease the likelihood of acute low back pain becoming chronic.1  Back pain is a complex issue to diagnose. A definite diagnosis for back pain cannot be made for nearly 85% of patients at the initial visit.   Medicine such as muscle relaxers and anti-inflammatories can be helpful. Narcotic pain medicine is not recommended for back pain.    Imaging Guidance:  Imaging such as MRIs and X-rays are not necessary in the beginning and do not influence treatment or influence the course of acute back pain. Degenerative changes such as disc bulges and arthritis are common in people as young as 18 and with no pain.  In people under 50 with no signs and symptoms of systemic disease no imaging is needed.2  Conservative care for 6 weeks is recommended for patients with or without shooting nerve pain in arms/legs prior to MRI.2  Imaging of patients with low back pain without indications of serious underlying conditions does not improve outcomes.3  RESOURCES  [facility] website:  [email or web address]  [facility] Pain Management  website:  [email or web address]://[email or web address]/[email or web address]  Books:  Treat Your Own Back by Juan Patel  Treat Your Own Neck by Juan Patel  KEY TAKEAWAYS:  Moving is recommended. Bed rest is not recommended.  Seek Physical Therapy as early as possible.  Acute flares will improve.    1WRuben yen et al. Nonpharmacologic options for treating acute and chronic pain. PMR journal 7 2015) [id number]  2JJavier rodríguez and Tiburcio Flores. Diagnostic evaluation of low back pain with emphasis on imaging. Annals of internal medicine 2002; 137:586-597  3CJohnny sarabia Rongwei Fu, Tiburcio Cormier. Imaging studies for low back pain: systemic review and meta-analysis. Lancet 2009;373 463-20

## 2023-11-01 ENCOUNTER — PATIENT MESSAGE (OUTPATIENT)
Dept: ENDOSCOPY | Facility: HOSPITAL | Age: 63
End: 2023-11-01
Payer: MEDICARE

## 2023-11-05 RX ORDER — ROSUVASTATIN CALCIUM 20 MG/1
20 TABLET, COATED ORAL
Qty: 90 TABLET | Refills: 3 | Status: SHIPPED | OUTPATIENT
Start: 2023-11-05

## 2023-11-06 ENCOUNTER — ANESTHESIA EVENT (OUTPATIENT)
Dept: ENDOSCOPY | Facility: HOSPITAL | Age: 63
End: 2023-11-06
Payer: MEDICARE

## 2023-11-06 ENCOUNTER — ANESTHESIA (OUTPATIENT)
Dept: ENDOSCOPY | Facility: HOSPITAL | Age: 63
End: 2023-11-06
Payer: MEDICARE

## 2023-11-06 ENCOUNTER — HOSPITAL ENCOUNTER (OUTPATIENT)
Facility: HOSPITAL | Age: 63
Discharge: HOME | End: 2023-11-06
Attending: COLON & RECTAL SURGERY | Admitting: COLON & RECTAL SURGERY
Payer: MEDICARE

## 2023-11-06 VITALS
HEIGHT: 64 IN | BODY MASS INDEX: 28.85 KG/M2 | SYSTOLIC BLOOD PRESSURE: 143 MMHG | HEART RATE: 74 BPM | WEIGHT: 169 LBS | TEMPERATURE: 98 F | OXYGEN SATURATION: 99 % | RESPIRATION RATE: 17 BRPM | DIASTOLIC BLOOD PRESSURE: 71 MMHG

## 2023-11-06 DIAGNOSIS — Z12.11 SCREENING FOR MALIGNANT NEOPLASM OF COLON: Primary | ICD-10-CM

## 2023-11-06 PROCEDURE — 25000003 PHARM REV CODE 250: Performed by: NURSE ANESTHETIST, CERTIFIED REGISTERED

## 2023-11-06 PROCEDURE — 37000008 HC ANESTHESIA 1ST 15 MINUTES: Performed by: COLON & RECTAL SURGERY

## 2023-11-06 PROCEDURE — 25000003 PHARM REV CODE 250: Performed by: COLON & RECTAL SURGERY

## 2023-11-06 PROCEDURE — G0121 COLON CA SCRN NOT HI RSK IND: ICD-10-PCS | Mod: ,,, | Performed by: COLON & RECTAL SURGERY

## 2023-11-06 PROCEDURE — E9220 PRA ENDO ANESTHESIA: ICD-10-PCS | Mod: ,,, | Performed by: NURSE ANESTHETIST, CERTIFIED REGISTERED

## 2023-11-06 PROCEDURE — E9220 PRA ENDO ANESTHESIA: HCPCS | Mod: ,,, | Performed by: NURSE ANESTHETIST, CERTIFIED REGISTERED

## 2023-11-06 PROCEDURE — G0121 COLON CA SCRN NOT HI RSK IND: HCPCS | Performed by: COLON & RECTAL SURGERY

## 2023-11-06 PROCEDURE — G0121 COLON CA SCRN NOT HI RSK IND: HCPCS | Mod: ,,, | Performed by: COLON & RECTAL SURGERY

## 2023-11-06 PROCEDURE — 63600175 PHARM REV CODE 636 W HCPCS: Performed by: NURSE ANESTHETIST, CERTIFIED REGISTERED

## 2023-11-06 PROCEDURE — 37000009 HC ANESTHESIA EA ADD 15 MINS: Performed by: COLON & RECTAL SURGERY

## 2023-11-06 RX ORDER — LIDOCAINE HYDROCHLORIDE 20 MG/ML
INJECTION INTRAVENOUS
Status: DISCONTINUED | OUTPATIENT
Start: 2023-11-06 | End: 2023-11-06

## 2023-11-06 RX ORDER — PROPOFOL 10 MG/ML
VIAL (ML) INTRAVENOUS CONTINUOUS PRN
Status: DISCONTINUED | OUTPATIENT
Start: 2023-11-06 | End: 2023-11-06

## 2023-11-06 RX ORDER — PROPOFOL 10 MG/ML
VIAL (ML) INTRAVENOUS
Status: DISCONTINUED | OUTPATIENT
Start: 2023-11-06 | End: 2023-11-06

## 2023-11-06 RX ORDER — SODIUM CHLORIDE 9 MG/ML
INJECTION, SOLUTION INTRAVENOUS CONTINUOUS
Status: DISCONTINUED | OUTPATIENT
Start: 2023-11-06 | End: 2023-11-06 | Stop reason: HOSPADM

## 2023-11-06 RX ADMIN — LIDOCAINE HYDROCHLORIDE 50 MG: 20 INJECTION INTRAVENOUS at 01:11

## 2023-11-06 RX ADMIN — PROPOFOL 70 MG: 10 INJECTION, EMULSION INTRAVENOUS at 01:11

## 2023-11-06 RX ADMIN — PROPOFOL 150 MCG/KG/MIN: 10 INJECTION, EMULSION INTRAVENOUS at 01:11

## 2023-11-06 RX ADMIN — SODIUM CHLORIDE: 0.9 INJECTION, SOLUTION INTRAVENOUS at 12:11

## 2023-11-06 NOTE — ANESTHESIA PREPROCEDURE EVALUATION
11/06/2023  Ivelisse Hay is a 63 y.o., female.    Active Problem List with Overview Notes    Diagnosis Date Noted    Vertigo 09/05/2022    Other osteoporosis without current pathological fracture 10/27/2020    NAFLD (nonalcoholic fatty liver disease) 03/22/2019    Macromastia 03/15/2018    Epigastric pain 05/05/2017    Phantosmia 12/10/2016    Aortic atherosclerosis 12/09/2016     Noted CT 2014      Nuclear sclerosis of both eyes 10/25/2015    Dry eye syndrome 10/25/2015    Hyperopia of both eyes with astigmatism and presbyopia 10/25/2015    S/P L shoulder surgery, RCR, SAD, DCE, labrum debridement, open subpec biceps tenodesis (12/11/14) 12/26/2014    Acromioclavicular joint arthritis 12/04/2014    Pain in joint, foot, left 11/25/2014    Calculus of kidney and ureter(592) 04/21/2014     Dx updated per 2019 IMO Load      Hypophosphatemia 04/04/2014    Hypokalemia 04/03/2014    Shoulder pain 03/19/2014    HTN (hypertension) 01/01/2014    HLD (hyperlipidemia) 01/01/2014    Impaired fasting glucose 01/01/2014    Abdominal pain, other specified site 12/12/2013    Rotator cuff tear 05/19/2013     Past Surgical History:   Procedure Laterality Date    CYSTOSCOPY      NEPHROSTOMY TUBE      CYSTOSCOPY WITH STENT    SHOULDER SURGERY  9-17-13    right    TOTAL REDUCTION MAMMOPLASTY Bilateral 2018    URETERAL STENT PLACEMENT      THEN REMOVED     Results for orders placed or performed in visit on 11/25/14   EKG 12-lead    Collection Time: 11/25/14  2:10 PM    Narrative    Test Reason : 401.9  Blood Pressure : mmHG  Vent. Rate : 067 BPM     Atrial Rate : 067 BPM     P-R Int : 194 ms          QRS Dur : 086 ms      QT Int : 404 ms       P-R-T Axes : 031 003 022 degrees     QTc Int : 426 ms    Normal sinus rhythm  Nonspecific T wave abnormality  Abnormal ECG  When compared with ECG of 03-APR-2014 09:40,  Inverted T  "waves have replaced nonspecific T wave abnormality in Anterior  leads  Nonspecific T wave abnormality no longer evident in Lateral leads  Confirmed by MARGARITA ROSALES MD ([id number]) on 11/26/2014 9:13:32 AM    Referred By: Kaley TORRES           Confirmed By:MARGARITA ROSALES MD     Exercise stress echo    Height: 5' 4" (1.626 m)   Weight: 72.6 kg (160 lb)   Blood Pressure: 156/85    Date of Study: 12/3/14   Ordering Provider: Cristal Enriquez MD   Clinical Indications: Abnormal EKG [794.31 (ICD-9-CM)]       Interpreting Physicians  Performing Staff   Result is not signed. No performing staff assigned to study.     Reason for Exam  Priority: Routine  Dx: Abnormal EKG [R94.31 (ICD-10-CM)]   Exercise stress echo  Order: [id number]  Status: Final result       Visible to patient: Yes (seen)       Next appt: 11/08/2023 at 10:00 AM in Outpatient Rehab (Za Hernadez, PT)       Dx: Abnormal EKG    1 Result Note       1 Patient Communication        Component Ref Range & Units 8 yr ago 9 yr ago   EF + QEF 55 - 65 60 66 R   Diastolic Dysfunction  No No   Resulting Agency  [facility]              Narrative  Performed by: [facility]  PRE-TEST DATA  EKG: Resting electrocardiogram reveals normal sinus rhythm at a rate of 88 bpm.    There were no significant electrocardiographic changes throughout the protocol suggesting ischemia.    EKG Conclusions:    1. The EKG portion of this study is negative for ischemia at a moderate workload, and peak heart rate of 153 bpm (97% of predicted).  2. Exercise capacity is average.  3. Blood pressure response to exercise was normal (Presenting BP: 135/70 Peak BP: 189/76).  4. No significant arrhythmias were present.  5. There were no symptoms of chest discomfort or significant dyspnea throughout the protocol.  6. The Duke treadmill score was 7 suggesting a low probability for future cardiovascular events.           Pre-op Assessment    I have reviewed the Patient Summary Reports.    I have reviewed " the NPO Status.   I have reviewed the Medications.     Review of Systems  Anesthesia Hx:  No problems with previous Anesthesia                Hematology/Oncology:  Hematology Normal   Oncology Normal                                   EENT/Dental:  EENT/Dental Normal           Cardiovascular:     Hypertension              ECG has been reviewed.                          Pulmonary:  Pulmonary Normal                       Renal/:   renal calculi               Hepatic/GI:     GERD Liver Disease,  NAFLD          Musculoskeletal:  Musculoskeletal Normal                Neurological:  Neurology Normal                                      Endocrine:  Endocrine Normal            Dermatological:  Skin Normal    Psych:  Psychiatric Normal                    Physical Exam  General: Well nourished, Cooperative, Alert and Oriented    Airway:  Mallampati: II   Mouth Opening: Normal  TM Distance: Normal  Tongue: Normal  Neck ROM: Normal ROM    Dental:  Intact    Chest/Lungs:  Normal Respiratory Rate      Anesthesia Plan  Type of Anesthesia, risks & benefits discussed:    Anesthesia Type: Gen Natural Airway  Intra-op Monitoring Plan: Standard ASA Monitors  Post Op Pain Control Plan: multimodal analgesia  Induction:  IV  Informed Consent: Informed consent signed with the Patient and all parties understand the risks and agree with anesthesia plan.  All questions answered.   ASA Score: 2  Day of Surgery Review of History & Physical: H&P Update referred to the surgeon/provider.    Ready For Surgery From Anesthesia Perspective.     .

## 2023-11-06 NOTE — TRANSFER OF CARE
"Anesthesia Transfer of Care Note    Patient: Ivelisse Hay    Procedure(s) Performed: Procedure(s) (LRB):  COLONOSCOPY (N/A)    Patient location: GI    Anesthesia Type: general    Transport from OR: Transported from OR on room air with adequate spontaneous ventilation    Post pain: adequate analgesia    Post assessment: no apparent anesthetic complications and tolerated procedure well    Post vital signs: stable    Level of consciousness: awake, alert and oriented    Nausea/Vomiting: no nausea/vomiting    Complications: none    Transfer of care protocol was followed    Last vitals: Visit Vitals  BP (!) 114/59   Pulse 82   Temp 36.6 °C (97.9 °F) (Temporal)   Resp 16   Ht 5' 4" (1.626 m)   Wt 76.7 kg (169 lb)   SpO2 99%   BMI 29.01 kg/m²     "

## 2023-11-06 NOTE — PROVATION PATIENT INSTRUCTIONS
Discharge Summary/Instructions after an Endoscopic Procedure  Patient Name: Ivelisse Hay  Patient MRN: [id number]  Patient YOB: 1960 Monday, November 6, 2023  Amber Nix MD  Dear patient,  As a result of recent federal legislation (The Federal Cures Act), you may   receive lab or pathology results from your procedure in your [facility]   account before your physician is able to contact you. Your physician or   their representative will relay the results to you with their   recommendations at their soonest availability.  Thank you,  RESTRICTIONS:  During your procedure today, you received medications for sedation.  These   medications may affect your judgment, balance and coordination.  Therefore,   for 24 hours, you have the following restrictions:   - DO NOT drive a car, operate machinery, make legal/financial decisions,   sign important papers or drink alcohol.    ACTIVITY:  Today: no heavy lifting, straining or running due to procedural   sedation/anesthesia.  The following day: return to full activity including work.  DIET:  Eat and drink normally unless instructed otherwise.     TREATMENT FOR COMMON SIDE EFFECTS:  - Mild abdominal pain, nausea, belching, bloating or excessive gas:  rest,   eat lightly and use a heating pad.  - Sore Throat: treat with throat lozenges and/or gargle with warm salt   water.  - Because air was used during the procedure, expelling large amounts of air   from your rectum or belching is normal.  - If a bowel prep was taken, you may not have a bowel movement for 1-3 days.    This is normal.  SYMPTOMS TO WATCH FOR AND REPORT TO YOUR PHYSICIAN:  1. Abdominal pain or bloating, other than gas cramps.  2. Chest pain.  3. Back pain.  4. Signs of infection such as: chills or fever occurring within 24 hours   after the procedure.  5. Rectal bleeding, which would show as bright red, maroon, or black stools.   (A tablespoon of blood from the rectum is not serious, especially if    hemorrhoids are present.)  6. Vomiting.  7. Weakness or dizziness.  GO DIRECTLY TO THE NEAREST EMERGENCY ROOM IF YOU HAVE ANY OF THE FOLLOWING:      Difficulty breathing              Chills and/or fever over 101 F   Persistent vomiting and/or vomiting blood   Severe abdominal pain   Severe chest pain   Black, tarry stools   Bleeding- more than one tablespoon   Any other symptom or condition that you feel may need urgent attention  Your doctor recommends these additional instructions:  If any biopsies were taken, your doctors clinic will contact you in 1 to 2   weeks with any results.  - Discharge patient to home.   - Resume previous diet.   - Continue present medications.   - Repeat colonoscopy in 10 years for screening purposes.   - Written discharge instructions were provided to the patient.   - The signs and symptoms of potential delayed complications were discussed   with the patient.   - Patient has a contact number available for emergencies.   - Return to normal activities tomorrow.  For questions, problems or results please call your physician - Amber Nix MD at Work:  [phone].  [facility], EMERGENCY ROOM PHONE NUMBER: [phone]  IF A COMPLICATION OR EMERGENCY SITUATION ARISES AND YOU ARE UNABLE TO REACH   YOUR PHYSICIAN - GO DIRECTLY TO THE EMERGENCY ROOM.  Amber Nix MD  11/6/2023 1:39:09 PM  This report has been verified and signed electronically.  Dear patient,  As a result of recent federal legislation (The Federal Cures Act), you may   receive lab or pathology results from your procedure in your [facility]   account before your physician is able to contact you. Your physician or   their representative will relay the results to you with their   recommendations at their soonest availability.  Thank you,  PROVATION

## 2023-11-06 NOTE — ANESTHESIA RELEASE NOTE
"Anesthesia Release from PACU Note    Patient: Ivelisse Hay    Procedure(s) Performed: Procedure(s) (LRB):  COLONOSCOPY (N/A)    Anesthesia type: general    Post pain: Adequate analgesia    Post assessment: no apparent anesthetic complications and tolerated procedure well    Last Vitals:   Visit Vitals  BP (!) 143/71 (BP Location: Left arm, Patient Position: Lying)   Pulse 74   Temp 36.6 °C (97.9 °F) (Temporal)   Resp 17   Ht 5' 4" (1.626 m)   Wt 76.7 kg (169 lb)   SpO2 99%   BMI 29.01 kg/m²       Post vital signs: stable    Level of consciousness: awake and alert     Nausea/Vomiting: no nausea/no vomiting    Complications: none    Airway Patency: patent    Respiratory: unassisted, spontaneous ventilation, room air    Cardiovascular: stable and blood pressure at baseline    Hydration: euvolemic  "

## 2023-11-06 NOTE — ANESTHESIA POSTPROCEDURE EVALUATION
Anesthesia Post Evaluation    Patient: Ivelisse Hay    Procedure(s) Performed: Procedure(s) (LRB):  COLONOSCOPY (N/A)    Final Anesthesia Type: general      Patient location during evaluation: GI PACU  Patient participation: Yes- Able to Participate  Level of consciousness: awake and alert  Post-procedure vital signs: reviewed and stable  Pain management: adequate  Airway patency: patent    PONV status at discharge: No PONV  Anesthetic complications: no      Cardiovascular status: hemodynamically stable  Respiratory status: unassisted, spontaneous ventilation and room air  Hydration status: euvolemic  Follow-up not needed.          Vitals Value Taken Time   /71 11/06/23 1411   Temp  11/06/23 1448   Pulse 74 11/06/23 1411   Resp 17 11/06/23 1411   SpO2 99 % 11/06/23 1411         Event Time   Out of Recovery 14:22:45         Pain/Yoan Score: Yoan Score: 10 (11/6/2023  1:44 PM)

## 2023-11-06 NOTE — H&P
COLONOSCOPY HISTORY AND PHYSICAL EXAM    Procedure : Colonoscopy      INDICATIONS: asymptomatic screening exam    Family Hx of CRC: None    Last Colonoscopy:    Findings: Normal       Past Medical History:   Diagnosis Date    Diarrhea     Gastritis     GERD (gastroesophageal reflux disease)     HLD (hyperlipidemia)     Hypertension      Sedation Problems: NO  Family History   Problem Relation Age of Onset    No Known Problems Mother     Hypertension Father     Lung cancer Father         d. 72    No Known Problems Sister     Hypertension Brother     No Known Problems Daughter         OC- Ophth Glaucoma, 2022-Yvette    No Known Problems Son         @ - therapist- OT;  - Pascual  2018   2022    No Known Problems Grandchild     No Known Problems Grandchild      Fam Hx of Sedation Problems: NO  Social History     Socioeconomic History    Marital status:    Tobacco Use    Smoking status: Never    Smokeless tobacco: Never   Substance and Sexual Activity    Alcohol use: No    Drug use: No    Sexual activity: Yes     Partners: Male   Social History Narrative        2 children, 1 dtr and 1 son    dtr in Med school    ,     Son to be  2015    +/- exercise     Social Determinants of Health     Financial Resource Strain: Low Risk  (2022)    Overall Financial Resource Strain (CARDIA)     Difficulty of Paying Living Expenses: Not hard at all   Food Insecurity: No Food Insecurity (2023)    Hunger Vital Sign     Worried About Running Out of Food in the Last Year: Never true     Ran Out of Food in the Last Year: Never true   Transportation Needs: No Transportation Needs (2023)    PRAPARE - Transportation     Lack of Transportation (Medical): No     Lack of Transportation (Non-Medical): No   Physical Activity: Unknown (2023)    Exercise Vital Sign     Days of Exercise per Week: Patient refused   Stress: No Stress Concern Present (2023)    [employer] [place]  "of Occupational Health - Occupational Stress Questionnaire     Feeling of Stress : Not at all   Social Connections: Unknown (9/4/2023)    Social Connection and Isolation Panel [NHANES]     Frequency of Communication with Friends and Family: Once a week     Frequency of Social Gatherings with Friends and Family: Once a week     Active Member of Clubs or Organizations: Patient refused     Attends Club or Organization Meetings: Patient refused     Marital Status:    Housing Stability: Unknown (9/4/2023)    Housing Stability Vital Sign     Unable to Pay for Housing in the Last Year: No     Unstable Housing in the Last Year: No       Review of Systems - Negative except   Respiratory ROS: no dyspnea  Cardiovascular ROS: no exertional chest pain  Gastrointestinal ROS: NO abdominal discomfort,  NO rectal bleeding  Musculoskeletal ROS: no muscular pain  Neurological ROS: no recent stroke    Physical Exam:  /80 (BP Location: Left arm, Patient Position: Lying)   Pulse 93   Temp 97.9 °F (36.6 °C) (Temporal)   Resp 14   Ht 5' 4" (1.626 m)   Wt 76.7 kg (169 lb)   SpO2 98%   BMI 29.01 kg/m²   General: no distress  Head: normocephalic  Mallampati Score   Neck: supple, symmetrical, trachea midline  Lungs:  clear to auscultation bilaterally and normal respiratory effort  Heart: regular rate and rhythm and no murmur  Abdomen: soft, non-tender non-distented; bowel sounds normal; no masses,  no organomegaly  Extremities: no cyanosis or edema, or clubbing    ASA:  II    PLAN  COLONOSCOPY.    SedationPlan :MAC    The details of the procedure, the possible need for biopsy or polypectomy and the potential risks including bleeding, perforation, missed polyps were discussed in detail.    "

## 2023-11-07 PROBLEM — R29.898 DECREASED STRENGTH OF TRUNK AND BACK: Status: ACTIVE | Noted: 2023-11-07

## 2023-11-07 PROBLEM — R68.89 IMPAIRED TOLERANCE OF ACTIVITY: Status: ACTIVE | Noted: 2023-11-07

## 2023-11-22 RX ORDER — LOSARTAN POTASSIUM 50 MG/1
50 TABLET ORAL DAILY
Qty: 90 TABLET | Refills: 3 | Status: SHIPPED | OUTPATIENT
Start: 2023-11-22

## 2023-12-05 RX ORDER — TRIAMTERENE AND HYDROCHLOROTHIAZIDE 37.5; 25 MG/1; MG/1
1 CAPSULE ORAL DAILY
Qty: 90 CAPSULE | Refills: 3 | Status: SHIPPED | OUTPATIENT
Start: 2023-12-05

## 2023-12-05 NOTE — TELEPHONE ENCOUNTER
----- Message from Ethel Garcia sent at 12/5/2023  9:36 AM CST -----  Contact: Pt  [phone]  Requesting an RX refill or new RX.  Is this a refill or new RX: Refill  RX name and strength (copy/paste from chart):  triamterene-hydrochlorothiazide 37.5-25 mg (DYAZIDE) 37.5-25 mg per capsule  Is this a 30 day or 90 day RX: 90  Pharmacy name and phone # (copy/paste from chart):    [facility] [place] - [place] - [address]  [address]  [place] [address]  Phone: [phone] Fax: [phone]    Comment:  Patient is OUT of this medication.    Please call and advise.    Thank You    ##Please do NOT reply to sender as this is from the call center and they answer incoming calls only.

## 2023-12-11 ENCOUNTER — PATIENT MESSAGE (OUTPATIENT)
Dept: PHYSICAL MEDICINE AND REHAB | Facility: CLINIC | Age: 63
End: 2023-12-11
Payer: MEDICARE

## 2023-12-11 ENCOUNTER — LAB VISIT (OUTPATIENT)
Dept: LAB | Facility: HOSPITAL | Age: 63
End: 2023-12-11
Attending: INTERNAL MEDICINE
Payer: MEDICARE

## 2023-12-11 DIAGNOSIS — E78.5 HYPERLIPIDEMIA, UNSPECIFIED HYPERLIPIDEMIA TYPE: ICD-10-CM

## 2023-12-11 LAB
ALT SERPL W/O P-5'-P-CCNC: 33 U/L (ref 10–44)
AST SERPL-CCNC: 27 U/L (ref 10–40)
CHOLEST SERPL-MCNC: 165 MG/DL (ref 120–199)
CHOLEST/HDLC SERPL: 2.6 {RATIO} (ref 2–5)
HDLC SERPL-MCNC: 64 MG/DL (ref 40–75)
HDLC SERPL: 38.8 % (ref 20–50)
LDLC SERPL CALC-MCNC: 69.8 MG/DL (ref 63–159)
NONHDLC SERPL-MCNC: 101 MG/DL
TRIGL SERPL-MCNC: 156 MG/DL (ref 30–150)

## 2023-12-11 PROCEDURE — 84450 TRANSFERASE (AST) (SGOT): CPT | Performed by: INTERNAL MEDICINE

## 2023-12-11 PROCEDURE — 84460 ALANINE AMINO (ALT) (SGPT): CPT | Performed by: INTERNAL MEDICINE

## 2023-12-11 PROCEDURE — 80061 LIPID PANEL: CPT | Performed by: INTERNAL MEDICINE

## 2023-12-11 PROCEDURE — 36415 COLL VENOUS BLD VENIPUNCTURE: CPT | Mod: PO | Performed by: INTERNAL MEDICINE

## 2023-12-12 ENCOUNTER — TELEPHONE (OUTPATIENT)
Dept: SPINE | Facility: CLINIC | Age: 63
End: 2023-12-12

## 2023-12-12 NOTE — TELEPHONE ENCOUNTER
Can you please let patient know, there is no appt available on 12/19 at 11am? There is a 2:30 pm. That is all Dr. Khan has that day.

## 2023-12-12 NOTE — TELEPHONE ENCOUNTER
Staff contacted pt due to being rescheduled , Pt wanted an early morning appointment however  didn't have anything .    Pt stated she will keep her appointment dec 26th at [place] .

## 2023-12-15 ENCOUNTER — TELEPHONE (OUTPATIENT)
Dept: INTERNAL MEDICINE | Facility: CLINIC | Age: 63
End: 2023-12-15
Payer: MEDICARE

## 2023-12-15 NOTE — TELEPHONE ENCOUNTER
----- Message from Cristal Enriquez MD sent at 12/14/2023  7:47 PM CST -----  Your blood work has resulted and your triglycerides have improved almost 100 points.  Your total cholesterol is also improved in your liver enzymes are stable.  Please continue your present regimen of rosuvastatin and Lovaza.  jay jay
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
## 2023-12-26 ENCOUNTER — OFFICE VISIT (OUTPATIENT)
Dept: PHYSICAL MEDICINE AND REHAB | Facility: CLINIC | Age: 63
End: 2023-12-26
Payer: MEDICARE

## 2023-12-26 VITALS
SYSTOLIC BLOOD PRESSURE: 130 MMHG | BODY MASS INDEX: 28.86 KG/M2 | WEIGHT: 169.06 LBS | DIASTOLIC BLOOD PRESSURE: 80 MMHG | HEART RATE: 80 BPM | HEIGHT: 64 IN | TEMPERATURE: 98 F

## 2023-12-26 DIAGNOSIS — M54.9 DORSALGIA, UNSPECIFIED: Primary | ICD-10-CM

## 2023-12-26 DIAGNOSIS — M47.816 SPONDYLOSIS OF LUMBAR REGION WITHOUT MYELOPATHY OR RADICULOPATHY: ICD-10-CM

## 2023-12-26 DIAGNOSIS — S32.010S COMPRESSION FRACTURE OF L1 VERTEBRA, SEQUELA: ICD-10-CM

## 2023-12-26 DIAGNOSIS — M54.50 ACUTE LEFT-SIDED LOW BACK PAIN WITHOUT SCIATICA: ICD-10-CM

## 2023-12-26 PROCEDURE — 3008F BODY MASS INDEX DOCD: CPT | Mod: CPTII,S$GLB,, | Performed by: PHYSICAL MEDICINE & REHABILITATION

## 2023-12-26 PROCEDURE — 99999 PR PBB SHADOW E&M-EST. PATIENT-LVL IV: ICD-10-PCS | Mod: PBBFAC,,, | Performed by: PHYSICAL MEDICINE & REHABILITATION

## 2023-12-26 PROCEDURE — 3008F PR BODY MASS INDEX (BMI) DOCUMENTED: ICD-10-PCS | Mod: CPTII,S$GLB,, | Performed by: PHYSICAL MEDICINE & REHABILITATION

## 2023-12-26 PROCEDURE — 99999 PR PBB SHADOW E&M-EST. PATIENT-LVL IV: CPT | Mod: PBBFAC,,, | Performed by: PHYSICAL MEDICINE & REHABILITATION

## 2023-12-26 PROCEDURE — 3061F NEG MICROALBUMINURIA REV: CPT | Mod: CPTII,S$GLB,, | Performed by: PHYSICAL MEDICINE & REHABILITATION

## 2023-12-26 PROCEDURE — 3044F HG A1C LEVEL LT 7.0%: CPT | Mod: CPTII,S$GLB,, | Performed by: PHYSICAL MEDICINE & REHABILITATION

## 2023-12-26 PROCEDURE — 1160F RVW MEDS BY RX/DR IN RCRD: CPT | Mod: CPTII,S$GLB,, | Performed by: PHYSICAL MEDICINE & REHABILITATION

## 2023-12-26 PROCEDURE — 3075F SYST BP GE 130 - 139MM HG: CPT | Mod: CPTII,S$GLB,, | Performed by: PHYSICAL MEDICINE & REHABILITATION

## 2023-12-26 PROCEDURE — 1160F PR REVIEW ALL MEDS BY PRESCRIBER/CLIN PHARMACIST DOCUMENTED: ICD-10-PCS | Mod: CPTII,S$GLB,, | Performed by: PHYSICAL MEDICINE & REHABILITATION

## 2023-12-26 PROCEDURE — 3079F DIAST BP 80-89 MM HG: CPT | Mod: CPTII,S$GLB,, | Performed by: PHYSICAL MEDICINE & REHABILITATION

## 2023-12-26 PROCEDURE — 4010F ACE/ARB THERAPY RXD/TAKEN: CPT | Mod: CPTII,S$GLB,, | Performed by: PHYSICAL MEDICINE & REHABILITATION

## 2023-12-26 PROCEDURE — 3075F PR MOST RECENT SYSTOLIC BLOOD PRESS GE 130-139MM HG: ICD-10-PCS | Mod: CPTII,S$GLB,, | Performed by: PHYSICAL MEDICINE & REHABILITATION

## 2023-12-26 PROCEDURE — 99214 OFFICE O/P EST MOD 30 MIN: CPT | Mod: S$GLB,,, | Performed by: PHYSICAL MEDICINE & REHABILITATION

## 2023-12-26 PROCEDURE — 3066F NEPHROPATHY DOC TX: CPT | Mod: CPTII,S$GLB,, | Performed by: PHYSICAL MEDICINE & REHABILITATION

## 2023-12-26 PROCEDURE — 99214 PR OFFICE/OUTPT VISIT, EST, LEVL IV, 30-39 MIN: ICD-10-PCS | Mod: S$GLB,,, | Performed by: PHYSICAL MEDICINE & REHABILITATION

## 2023-12-26 PROCEDURE — 1159F MED LIST DOCD IN RCRD: CPT | Mod: CPTII,S$GLB,, | Performed by: PHYSICAL MEDICINE & REHABILITATION

## 2023-12-26 PROCEDURE — 3079F PR MOST RECENT DIASTOLIC BLOOD PRESSURE 80-89 MM HG: ICD-10-PCS | Mod: CPTII,S$GLB,, | Performed by: PHYSICAL MEDICINE & REHABILITATION

## 2023-12-26 PROCEDURE — 4010F PR ACE/ARB THEARPY RXD/TAKEN: ICD-10-PCS | Mod: CPTII,S$GLB,, | Performed by: PHYSICAL MEDICINE & REHABILITATION

## 2023-12-26 PROCEDURE — 3044F PR MOST RECENT HEMOGLOBIN A1C LEVEL <7.0%: ICD-10-PCS | Mod: CPTII,S$GLB,, | Performed by: PHYSICAL MEDICINE & REHABILITATION

## 2023-12-26 PROCEDURE — 3061F PR NEG MICROALBUMINURIA RESULT DOCUMENTED/REVIEW: ICD-10-PCS | Mod: CPTII,S$GLB,, | Performed by: PHYSICAL MEDICINE & REHABILITATION

## 2023-12-26 PROCEDURE — 1159F PR MEDICATION LIST DOCUMENTED IN MEDICAL RECORD: ICD-10-PCS | Mod: CPTII,S$GLB,, | Performed by: PHYSICAL MEDICINE & REHABILITATION

## 2023-12-26 PROCEDURE — 3066F PR DOCUMENTATION OF TREATMENT FOR NEPHROPATHY: ICD-10-PCS | Mod: CPTII,S$GLB,, | Performed by: PHYSICAL MEDICINE & REHABILITATION

## 2024-01-17 ENCOUNTER — HOSPITAL ENCOUNTER (OUTPATIENT)
Dept: RADIOLOGY | Facility: HOSPITAL | Age: 64
Discharge: HOME | End: 2024-01-17
Attending: PHYSICAL MEDICINE & REHABILITATION
Payer: MEDICARE

## 2024-01-17 DIAGNOSIS — M54.9 DORSALGIA, UNSPECIFIED: ICD-10-CM

## 2024-01-17 PROCEDURE — 72148 MRI LUMBAR SPINE W/O DYE: CPT | Mod: TC

## 2024-01-17 PROCEDURE — 72148 MRI LUMBAR SPINE W/O DYE: CPT | Mod: 26,,, | Performed by: RADIOLOGY

## 2024-01-25 ENCOUNTER — PATIENT MESSAGE (OUTPATIENT)
Dept: PHYSICAL MEDICINE AND REHAB | Facility: CLINIC | Age: 64
End: 2024-01-25
Payer: MEDICARE

## 2024-02-06 PROBLEM — R68.89 IMPAIRED TOLERANCE OF ACTIVITY: Status: RESOLVED | Noted: 2023-11-07 | Resolved: 2024-02-06

## 2024-02-06 PROBLEM — R29.898 DECREASED STRENGTH OF TRUNK AND BACK: Status: RESOLVED | Noted: 2023-11-07 | Resolved: 2024-02-06

## 2024-02-19 ENCOUNTER — PATIENT MESSAGE (OUTPATIENT)
Dept: INTERNAL MEDICINE | Facility: CLINIC | Age: 64
End: 2024-02-19
Payer: MEDICARE

## 2024-02-19 DIAGNOSIS — I10 ESSENTIAL HYPERTENSION: Primary | ICD-10-CM

## 2024-02-19 DIAGNOSIS — R73.03 PRE-DIABETES: ICD-10-CM

## 2024-02-19 DIAGNOSIS — E78.5 HYPERLIPIDEMIA, UNSPECIFIED HYPERLIPIDEMIA TYPE: ICD-10-CM

## 2024-03-03 ENCOUNTER — PATIENT MESSAGE (OUTPATIENT)
Dept: INTERNAL MEDICINE | Facility: CLINIC | Age: 64
End: 2024-03-03
Payer: MEDICARE

## 2024-03-05 ENCOUNTER — LAB VISIT (OUTPATIENT)
Dept: LAB | Facility: HOSPITAL | Age: 64
End: 2024-03-05
Attending: INTERNAL MEDICINE
Payer: MEDICARE

## 2024-03-05 DIAGNOSIS — I10 ESSENTIAL HYPERTENSION: ICD-10-CM

## 2024-03-05 DIAGNOSIS — E78.5 HYPERLIPIDEMIA, UNSPECIFIED HYPERLIPIDEMIA TYPE: ICD-10-CM

## 2024-03-05 DIAGNOSIS — R73.03 PRE-DIABETES: ICD-10-CM

## 2024-03-05 LAB
ALBUMIN SERPL BCP-MCNC: 4.2 G/DL (ref 3.5–5.2)
ALP SERPL-CCNC: 77 U/L (ref 55–135)
ALT SERPL W/O P-5'-P-CCNC: 32 U/L (ref 10–44)
ANION GAP SERPL CALC-SCNC: 10 MMOL/L (ref 8–16)
AST SERPL-CCNC: 29 U/L (ref 10–40)
BILIRUB SERPL-MCNC: 0.5 MG/DL (ref 0.1–1)
BUN SERPL-MCNC: 14 MG/DL (ref 8–23)
CALCIUM SERPL-MCNC: 10.5 MG/DL (ref 8.7–10.5)
CHLORIDE SERPL-SCNC: 105 MMOL/L (ref 95–110)
CHOLEST SERPL-MCNC: 189 MG/DL (ref 120–199)
CHOLEST/HDLC SERPL: 3 {RATIO} (ref 2–5)
CO2 SERPL-SCNC: 27 MMOL/L (ref 23–29)
CREAT SERPL-MCNC: 1 MG/DL (ref 0.5–1.4)
EST. GFR  (NO RACE VARIABLE): >60 ML/MIN/1.73 M^2
ESTIMATED AVG GLUCOSE: 123 MG/DL (ref 68–131)
GLUCOSE SERPL-MCNC: 112 MG/DL (ref 70–110)
HBA1C MFR BLD: 5.9 % (ref 4–5.6)
HDLC SERPL-MCNC: 63 MG/DL (ref 40–75)
HDLC SERPL: 33.3 % (ref 20–50)
LDLC SERPL CALC-MCNC: 86.6 MG/DL (ref 63–159)
NONHDLC SERPL-MCNC: 126 MG/DL
POTASSIUM SERPL-SCNC: 4.4 MMOL/L (ref 3.5–5.1)
PROT SERPL-MCNC: 7.4 G/DL (ref 6–8.4)
SODIUM SERPL-SCNC: 142 MMOL/L (ref 136–145)
TRIGL SERPL-MCNC: 197 MG/DL (ref 30–150)

## 2024-03-05 PROCEDURE — 83036 HEMOGLOBIN GLYCOSYLATED A1C: CPT | Performed by: INTERNAL MEDICINE

## 2024-03-05 PROCEDURE — 36415 COLL VENOUS BLD VENIPUNCTURE: CPT | Mod: PO | Performed by: INTERNAL MEDICINE

## 2024-03-05 PROCEDURE — 80053 COMPREHEN METABOLIC PANEL: CPT | Performed by: INTERNAL MEDICINE

## 2024-03-05 PROCEDURE — 80061 LIPID PANEL: CPT | Performed by: INTERNAL MEDICINE

## 2024-03-06 ENCOUNTER — TELEPHONE (OUTPATIENT)
Dept: INTERNAL MEDICINE | Facility: CLINIC | Age: 64
End: 2024-03-06
Payer: MEDICARE

## 2024-03-06 NOTE — TELEPHONE ENCOUNTER
----- Message from Cristal Enriquez MD sent at 3/6/2024 12:28 PM CST -----  Please review your lab work and we will discuss at your pending office visit.   jay jay
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
## 2024-03-11 ENCOUNTER — OFFICE VISIT (OUTPATIENT)
Dept: INTERNAL MEDICINE | Facility: CLINIC | Age: 64
End: 2024-03-11
Payer: MEDICARE

## 2024-03-11 VITALS
WEIGHT: 159.19 LBS | HEIGHT: 64 IN | TEMPERATURE: 98 F | SYSTOLIC BLOOD PRESSURE: 156 MMHG | HEART RATE: 86 BPM | RESPIRATION RATE: 18 BRPM | BODY MASS INDEX: 27.18 KG/M2 | OXYGEN SATURATION: 100 % | DIASTOLIC BLOOD PRESSURE: 72 MMHG

## 2024-03-11 DIAGNOSIS — I10 ESSENTIAL HYPERTENSION: Primary | ICD-10-CM

## 2024-03-11 DIAGNOSIS — I70.0 AORTIC ATHEROSCLEROSIS: ICD-10-CM

## 2024-03-11 DIAGNOSIS — E78.5 HYPERLIPIDEMIA, UNSPECIFIED HYPERLIPIDEMIA TYPE: ICD-10-CM

## 2024-03-11 DIAGNOSIS — R73.01 IMPAIRED FASTING GLUCOSE: ICD-10-CM

## 2024-03-11 DIAGNOSIS — K76.0 NAFLD (NONALCOHOLIC FATTY LIVER DISEASE): ICD-10-CM

## 2024-03-11 DIAGNOSIS — R73.03 PRE-DIABETES: ICD-10-CM

## 2024-03-11 PROCEDURE — 99215 OFFICE O/P EST HI 40 MIN: CPT | Mod: S$GLB,,, | Performed by: INTERNAL MEDICINE

## 2024-03-11 PROCEDURE — 99999 PR PBB SHADOW E&M-EST. PATIENT-LVL IV: CPT | Mod: PBBFAC,,, | Performed by: INTERNAL MEDICINE

## 2024-03-25 DIAGNOSIS — M54.9 DORSALGIA, UNSPECIFIED: Primary | ICD-10-CM

## 2024-03-25 RX ORDER — TIZANIDINE 2 MG/1
TABLET ORAL
Qty: 90 TABLET | Refills: 1 | Status: SHIPPED | OUTPATIENT
Start: 2024-03-25

## 2024-04-15 ENCOUNTER — PATIENT MESSAGE (OUTPATIENT)
Dept: INTERNAL MEDICINE | Facility: CLINIC | Age: 64
End: 2024-04-15
Payer: MEDICARE

## 2024-04-15 RX ORDER — IRBESARTAN 300 MG/1
300 TABLET ORAL NIGHTLY
Qty: 90 TABLET | Refills: 3 | Status: SHIPPED | OUTPATIENT
Start: 2024-04-15 | End: 2025-04-15

## 2024-04-15 NOTE — TELEPHONE ENCOUNTER
Please advise     Pt wants to know do you just want to refill the 50mg losartan or suggest changing the meds?  The pharmacy ([facility] in [place])    Morning BP Readings - 132/75, 148/88 & 100/70  Evening BP Readings - 134/75, 148/70 & 112/61

## 2024-04-15 NOTE — TELEPHONE ENCOUNTER
CM reviewed chart and noted transfer to [address] [facility]. Pt is independent with ADLs and IADLs. Pt has been given Care Card Application and resources. Care Management Specialist will schedule follow up appointment with free clinic. Pt has transportation home. CM will continue to follow.   BRITTNEE WayW, ACM Escripted new medication that should control her BP better, irbesartan 300mg one daily  She may switch when she completes the losartan or start now if she prefers

## 2024-04-19 ENCOUNTER — PATIENT MESSAGE (OUTPATIENT)
Dept: INTERNAL MEDICINE | Facility: CLINIC | Age: 64
End: 2024-04-19
Payer: MEDICARE

## 2024-04-19 DIAGNOSIS — E87.6 HYPOKALEMIA: Primary | ICD-10-CM

## 2024-04-19 NOTE — TELEPHONE ENCOUNTER
Please advise     Patient :should I continue to take potassium supplement ?  Also is it safe to take magnesium citrate?  And is it safe to have fruits with potassium like bananas?  Ivelisse Hay

## 2024-04-19 NOTE — TELEPHONE ENCOUNTER
The irbesartan is a potassium sparring antihypertensive like the losartan    In review of the most recent potassium level, it was in the normal range  And it should be fine to continue the present potassium supplementation  The potassium information is not in the EPIC medcard, please advise if it is OTC ( the usual OTC dose is 99 mg) and how many are taken daily    Its okay to take magnesium and to eat potassium rich foods, but in moderation and make sure to nat the food w/ a glass of water    Certainly can consider recheck of lab in 3-4 wks to see if levels are trending ( orders placed)

## 2024-05-06 ENCOUNTER — LAB VISIT (OUTPATIENT)
Dept: LAB | Facility: HOSPITAL | Age: 64
End: 2024-05-06
Attending: INTERNAL MEDICINE
Payer: MEDICARE

## 2024-05-06 DIAGNOSIS — E87.6 HYPOKALEMIA: ICD-10-CM

## 2024-05-06 LAB
ANION GAP SERPL CALC-SCNC: 11 MMOL/L (ref 8–16)
BUN SERPL-MCNC: 17 MG/DL (ref 8–23)
CALCIUM SERPL-MCNC: 10.1 MG/DL (ref 8.7–10.5)
CHLORIDE SERPL-SCNC: 104 MMOL/L (ref 95–110)
CO2 SERPL-SCNC: 27 MMOL/L (ref 23–29)
CREAT SERPL-MCNC: 0.9 MG/DL (ref 0.5–1.4)
EST. GFR  (NO RACE VARIABLE): >60 ML/MIN/1.73 M^2
GLUCOSE SERPL-MCNC: 125 MG/DL (ref 70–110)
MAGNESIUM SERPL-MCNC: 2.1 MG/DL (ref 1.6–2.6)
POTASSIUM SERPL-SCNC: 4.1 MMOL/L (ref 3.5–5.1)
SODIUM SERPL-SCNC: 142 MMOL/L (ref 136–145)

## 2024-05-06 PROCEDURE — 36415 COLL VENOUS BLD VENIPUNCTURE: CPT | Mod: PO | Performed by: INTERNAL MEDICINE

## 2024-05-06 PROCEDURE — 80048 BASIC METABOLIC PNL TOTAL CA: CPT | Performed by: INTERNAL MEDICINE

## 2024-05-06 PROCEDURE — 83735 ASSAY OF MAGNESIUM: CPT | Performed by: INTERNAL MEDICINE

## 2024-05-06 RX ORDER — ALENDRONATE SODIUM 70 MG/1
70 TABLET ORAL
Qty: 12 TABLET | Refills: 0 | Status: SHIPPED | OUTPATIENT
Start: 2024-05-06

## 2024-05-13 ENCOUNTER — TELEPHONE (OUTPATIENT)
Dept: INTERNAL MEDICINE | Facility: CLINIC | Age: 64
End: 2024-05-13
Payer: MEDICARE

## 2024-05-13 NOTE — TELEPHONE ENCOUNTER
----- Message from Cristal Enriquez MD sent at 5/11/2024 12:28 PM CDT -----  Ivelisse,  Your lab has resulted and it remains stable.    Cristal Giordano

## 2024-08-05 RX ORDER — ALENDRONATE SODIUM 70 MG/1
70 TABLET ORAL
Qty: 12 TABLET | Refills: 3 | Status: SHIPPED | OUTPATIENT
Start: 2024-08-05

## 2024-09-09 ENCOUNTER — PATIENT OUTREACH (OUTPATIENT)
Dept: ADMINISTRATIVE | Facility: HOSPITAL | Age: 64
End: 2024-09-09
Payer: MEDICARE

## 2024-09-09 ENCOUNTER — LAB VISIT (OUTPATIENT)
Dept: LAB | Facility: HOSPITAL | Age: 64
End: 2024-09-09
Attending: INTERNAL MEDICINE
Payer: MEDICARE

## 2024-09-09 DIAGNOSIS — I10 ESSENTIAL HYPERTENSION: ICD-10-CM

## 2024-09-09 DIAGNOSIS — Z12.31 ENCOUNTER FOR SCREENING MAMMOGRAM FOR MALIGNANT NEOPLASM OF BREAST: Primary | ICD-10-CM

## 2024-09-09 DIAGNOSIS — E78.5 HYPERLIPIDEMIA, UNSPECIFIED HYPERLIPIDEMIA TYPE: ICD-10-CM

## 2024-09-09 DIAGNOSIS — R73.01 IMPAIRED FASTING GLUCOSE: ICD-10-CM

## 2024-09-09 LAB
ALBUMIN SERPL BCP-MCNC: 4.2 G/DL (ref 3.5–5.2)
ALP SERPL-CCNC: 80 U/L (ref 55–135)
ALT SERPL W/O P-5'-P-CCNC: 29 U/L (ref 10–44)
ANION GAP SERPL CALC-SCNC: 13 MMOL/L (ref 8–16)
AST SERPL-CCNC: 26 U/L (ref 10–40)
BASOPHILS # BLD AUTO: 0.06 K/UL (ref 0–0.2)
BASOPHILS NFR BLD: 0.8 % (ref 0–1.9)
BILIRUB SERPL-MCNC: 0.5 MG/DL (ref 0.1–1)
BUN SERPL-MCNC: 16 MG/DL (ref 8–23)
CALCIUM SERPL-MCNC: 11.1 MG/DL (ref 8.7–10.5)
CHLORIDE SERPL-SCNC: 103 MMOL/L (ref 95–110)
CHOLEST SERPL-MCNC: 179 MG/DL (ref 120–199)
CHOLEST/HDLC SERPL: 2.6 {RATIO} (ref 2–5)
CO2 SERPL-SCNC: 27 MMOL/L (ref 23–29)
CREAT SERPL-MCNC: 1 MG/DL (ref 0.5–1.4)
DIFFERENTIAL METHOD BLD: ABNORMAL
EOSINOPHIL # BLD AUTO: 0.4 K/UL (ref 0–0.5)
EOSINOPHIL NFR BLD: 4.8 % (ref 0–8)
ERYTHROCYTE [DISTWIDTH] IN BLOOD BY AUTOMATED COUNT: 13.4 % (ref 11.5–14.5)
EST. GFR  (NO RACE VARIABLE): >60 ML/MIN/1.73 M^2
ESTIMATED AVG GLUCOSE: 114 MG/DL (ref 68–131)
GLUCOSE SERPL-MCNC: 107 MG/DL (ref 70–110)
HBA1C MFR BLD: 5.6 % (ref 4–5.6)
HCT VFR BLD AUTO: 40.7 % (ref 37–48.5)
HDLC SERPL-MCNC: 68 MG/DL (ref 40–75)
HDLC SERPL: 38 % (ref 20–50)
HGB BLD-MCNC: 12.8 G/DL (ref 12–16)
IMM GRANULOCYTES # BLD AUTO: 0.02 K/UL (ref 0–0.04)
IMM GRANULOCYTES NFR BLD AUTO: 0.3 % (ref 0–0.5)
LDLC SERPL CALC-MCNC: 81 MG/DL (ref 63–159)
LYMPHOCYTES # BLD AUTO: 2 K/UL (ref 1–4.8)
LYMPHOCYTES NFR BLD: 26.2 % (ref 18–48)
MCH RBC QN AUTO: 27.5 PG (ref 27–31)
MCHC RBC AUTO-ENTMCNC: 31.4 G/DL (ref 32–36)
MCV RBC AUTO: 87 FL (ref 82–98)
MONOCYTES # BLD AUTO: 0.6 K/UL (ref 0.3–1)
MONOCYTES NFR BLD: 8.3 % (ref 4–15)
NEUTROPHILS # BLD AUTO: 4.6 K/UL (ref 1.8–7.7)
NEUTROPHILS NFR BLD: 59.6 % (ref 38–73)
NONHDLC SERPL-MCNC: 111 MG/DL
NRBC BLD-RTO: 0 /100 WBC
PLATELET # BLD AUTO: 263 K/UL (ref 150–450)
PMV BLD AUTO: 10.8 FL (ref 9.2–12.9)
POTASSIUM SERPL-SCNC: 4.5 MMOL/L (ref 3.5–5.1)
PROT SERPL-MCNC: 7.3 G/DL (ref 6–8.4)
RBC # BLD AUTO: 4.66 M/UL (ref 4–5.4)
SODIUM SERPL-SCNC: 143 MMOL/L (ref 136–145)
TRIGL SERPL-MCNC: 150 MG/DL (ref 30–150)
TSH SERPL DL<=0.005 MIU/L-ACNC: 1.45 UIU/ML (ref 0.4–4)
WBC # BLD AUTO: 7.7 K/UL (ref 3.9–12.7)

## 2024-09-09 PROCEDURE — 84443 ASSAY THYROID STIM HORMONE: CPT | Performed by: INTERNAL MEDICINE

## 2024-09-09 PROCEDURE — 83036 HEMOGLOBIN GLYCOSYLATED A1C: CPT | Performed by: INTERNAL MEDICINE

## 2024-09-09 PROCEDURE — 80061 LIPID PANEL: CPT | Performed by: INTERNAL MEDICINE

## 2024-09-09 PROCEDURE — 85025 COMPLETE CBC W/AUTO DIFF WBC: CPT | Performed by: INTERNAL MEDICINE

## 2024-09-09 PROCEDURE — 80053 COMPREHEN METABOLIC PANEL: CPT | Performed by: INTERNAL MEDICINE

## 2024-09-09 PROCEDURE — 36415 COLL VENOUS BLD VENIPUNCTURE: CPT | Mod: PO | Performed by: INTERNAL MEDICINE

## 2024-09-09 NOTE — PROGRESS NOTES
Population Health Chart Review & Patient Outreach Details      Additional [facility] Health Notes:               Updates Requested / Reviewed:      Care Everywhere, , and Immunizations Reconciliation Completed or Queried: [place]         Health Maintenance Topics Overdue:      [facility] Score: 2     Cervical Cancer Screening  Uncontrolled BP    Influenza Vaccine                  Health Maintenance Topic(s) Outreach Outcomes & Actions Taken:    Primary Care Appt - Outreach Outcomes & Actions Taken  : Primary Care Appt Scheduled    Lab(s) - Outreach Outcomes & Actions Taken  : Overdue Lab(s) Scheduled    Breast Cancer Screening - Outreach Outcomes & Actions Taken  : Mammogram Order Placed

## 2024-09-10 ENCOUNTER — TELEPHONE (OUTPATIENT)
Dept: INTERNAL MEDICINE | Facility: CLINIC | Age: 64
End: 2024-09-10
Payer: MEDICARE

## 2024-09-10 NOTE — TELEPHONE ENCOUNTER
----- Message from Cristal Enriquez MD sent at 9/9/2024  5:03 PM CDT -----  Ivelisse,  Please review your lab work and we will discuss at your pending office visit.   jay jay

## 2024-09-10 NOTE — PROGRESS NOTES
65 yo female presents for  Annual PE  Nonsmoker  Social ETOH    HEALTH MAINT:  Chol/lab, reviewed-  C-scope,9/2012  Impression: - normal,  7 years   EGD, 5/2017-  Impression:           - Hiatal hernia.                        - Gastritis. Biopsied.                        - Normal examined duodenum. B  WWE, 2020, pap- normal, asx  Mammo, pending   BMD, pt not ready to update at this time  EYE exam, pending  DDS exam,UTD    VACCINATIONS:  Tdap, 12/31/2013  Flu, yearly    MedCard reviewed.   REVIEW OF SYSTEMS:   CONSTITUTIONAL: No fever, no chills, no night sweats.   EYES: No double vision or itchy watery eyes.   No focal deficits or neurologic sx  No left ear/jaw discomfort ;  ENT: Loss of smell and taste , w/up w/ ENT and Neurology unrevealing  CARDIOVASCULAR: No angina or palpitations.   No claudication w/ exercise-walking  RESPIRATORY: No cough or wheezing.   GI: Occ. indigestion or heartburn.   No blood in her stool or urine.    NEUROLOGICAL: No unusual headaches. No focal deficits. Olfactory hallucinations- head CT unremarkable  MS:   SKIN: no new lesions  ADL: 100% independent  AD: consider   Memory: Delayed recall  Mental Health: Good  Nutrition: challenge w/ loss of taste and smell (smells lemon and vinegar); cooks by sight  Gait: no recent   Safety: intact  U/A:   Remainder of review negative except as previously noted.         PHYSICAL EXAM:   VS: stable  GENERAL: She is alert and oriented in no acute distress. WDWN, conversant and cooperative. Pleasant pt  EYES: Conjunctivae and lids are okay.   Sclera anictericPupils are reactive.   ENT: Hearing intact  NECK: Supple. No thyromegaly or lymphadenopathy noted  RESPIRATORY: Efforts are unlabored.   LUNGS: Clear to auscultation.   HEART: Regular rate and rhythm. No carotid bruits,   1+ pedal pulses, no edema.   ABDOMEN: Bowel sounds present, soft, and nontender. No hepatosplenomegaly.  MS: Gait nl, No CCE  NEURO: CARRILLO. No tremor noted  SKIN: Warm and  dry      IMPRESSION/PLAN:  Annual PE  HTN, stable  -continue irbesartan 300mg   -continue Dyazide  -continue low salt diet  HLD, stable  - continue  rosuvastatin  - continue low fat diet   IFBS/ Pre- DM, stable  -continue diabetic diet  NAFLD, stable  -weight loss  OSteoporosis, stable  -continue alendronate  -continue calcium rich diet  -continue vitamin D 3, 2K qd  ============  Phantosmia, Neuro Consult 6/2022, unrevealing w/up  Vertigo  Sinus disease- noted on MRI  Microvascular ds- noted on MRI  Joint pain , multisite, synovitis metacarpals  IBS sx of frequent BM, to log food to see if any pattern  GERD, exacerbated, w/ w/ on 20mg BID now, would like to try 40mg once daily  -start omeprazole 40 pre-dinnet  -start famotidine 40mg hs  -take 2 weeks then advise, if sx improved, will try to skip the PPI 1-2 days weekly  Osteoporosis, on Ca and Vitamin D - level quite good at this time  SOB w/ exertion  -EKG today, if normal  -stress echo        -RTC 6 mos

## 2024-09-16 ENCOUNTER — OFFICE VISIT (OUTPATIENT)
Dept: INTERNAL MEDICINE | Facility: CLINIC | Age: 64
End: 2024-09-16
Payer: MEDICARE

## 2024-09-16 VITALS
TEMPERATURE: 97 F | BODY MASS INDEX: 27.77 KG/M2 | HEIGHT: 64 IN | WEIGHT: 162.69 LBS | RESPIRATION RATE: 18 BRPM | HEART RATE: 75 BPM | DIASTOLIC BLOOD PRESSURE: 78 MMHG | SYSTOLIC BLOOD PRESSURE: 130 MMHG | OXYGEN SATURATION: 99 %

## 2024-09-16 DIAGNOSIS — M54.9 DORSALGIA, UNSPECIFIED: ICD-10-CM

## 2024-09-16 DIAGNOSIS — I10 ESSENTIAL HYPERTENSION: ICD-10-CM

## 2024-09-16 DIAGNOSIS — Z78.0 POSTMENOPAUSAL ESTROGEN DEFICIENCY: ICD-10-CM

## 2024-09-16 DIAGNOSIS — R73.03 PRE-DIABETES: ICD-10-CM

## 2024-09-16 DIAGNOSIS — Z00.00 ANNUAL PHYSICAL EXAM: Primary | ICD-10-CM

## 2024-09-16 DIAGNOSIS — K76.0 NAFLD (NONALCOHOLIC FATTY LIVER DISEASE): ICD-10-CM

## 2024-09-16 DIAGNOSIS — I70.0 AORTIC ATHEROSCLEROSIS: ICD-10-CM

## 2024-09-16 DIAGNOSIS — M81.8 OTHER OSTEOPOROSIS WITHOUT CURRENT PATHOLOGICAL FRACTURE: ICD-10-CM

## 2024-09-16 DIAGNOSIS — R06.02 SOB (SHORTNESS OF BREATH) ON EXERTION: ICD-10-CM

## 2024-09-16 DIAGNOSIS — R73.01 IMPAIRED FASTING GLUCOSE: ICD-10-CM

## 2024-09-16 DIAGNOSIS — Z11.4 ENCOUNTER FOR SCREENING FOR HIV: ICD-10-CM

## 2024-09-16 DIAGNOSIS — E78.5 HYPERLIPIDEMIA, UNSPECIFIED HYPERLIPIDEMIA TYPE: ICD-10-CM

## 2024-09-16 LAB
OHS QRS DURATION: 82 MS
OHS QTC CALCULATION: 433 MS

## 2024-09-16 PROCEDURE — 99999 PR PBB SHADOW E&M-EST. PATIENT-LVL IV: CPT | Mod: PBBFAC,,, | Performed by: INTERNAL MEDICINE

## 2024-09-16 PROCEDURE — 93010 ELECTROCARDIOGRAM REPORT: CPT | Mod: S$GLB,,, | Performed by: INTERNAL MEDICINE

## 2024-09-16 RX ORDER — TIZANIDINE 2 MG/1
2 TABLET ORAL NIGHTLY PRN
Qty: 90 TABLET | Refills: 3 | Status: SHIPPED | OUTPATIENT
Start: 2024-09-16

## 2024-09-16 RX ORDER — OMEPRAZOLE 40 MG/1
40 CAPSULE, DELAYED RELEASE ORAL DAILY
Qty: 90 CAPSULE | Refills: 3 | Status: SHIPPED | OUTPATIENT
Start: 2024-09-16 | End: 2025-09-16

## 2024-09-16 RX ORDER — FAMOTIDINE 40 MG/1
40 TABLET, FILM COATED ORAL NIGHTLY
Qty: 90 TABLET | Refills: 3 | Status: SHIPPED | OUTPATIENT
Start: 2024-09-16

## 2024-09-18 ENCOUNTER — TELEPHONE (OUTPATIENT)
Dept: INTERNAL MEDICINE | Facility: CLINIC | Age: 64
End: 2024-09-18
Payer: MEDICARE

## 2024-09-18 NOTE — TELEPHONE ENCOUNTER
----- Message from Cristal Enriquez MD sent at 9/16/2024  5:23 PM CDT -----  Your EKG has been reviewed by Cardiology and no findings to suggest active angina,  Will order a stress echo and have the staff call you to schedule.  D[place]    Order placed for stress echo, please facilitate a call to pt to schedule

## 2024-09-18 NOTE — TELEPHONE ENCOUNTER
Called and informed pt about EKG results and also schedule pt Echo stress test on 10/7/24 at 10:30 am at met location.

## 2024-10-17 ENCOUNTER — TELEPHONE (OUTPATIENT)
Dept: INTERNAL MEDICINE | Facility: CLINIC | Age: 64
End: 2024-10-17
Payer: MEDICARE

## 2024-10-17 ENCOUNTER — HOSPITAL ENCOUNTER (OUTPATIENT)
Dept: CARDIOLOGY | Facility: HOSPITAL | Age: 64
Discharge: HOME | End: 2024-10-17
Attending: INTERNAL MEDICINE
Payer: MEDICARE

## 2024-10-17 VITALS
BODY MASS INDEX: 27.66 KG/M2 | WEIGHT: 162 LBS | HEART RATE: 84 BPM | HEIGHT: 64 IN | DIASTOLIC BLOOD PRESSURE: 56 MMHG | SYSTOLIC BLOOD PRESSURE: 130 MMHG

## 2024-10-17 DIAGNOSIS — R06.02 SOB (SHORTNESS OF BREATH) ON EXERTION: ICD-10-CM

## 2024-10-17 LAB
ASCENDING AORTA: 3.11 CM
BSA FOR ECHO PROCEDURE: 1.82 M2
CV ECHO LV RWT: 0.38 CM
CV STRESS BASE HR: 84 BPM
DIASTOLIC BLOOD PRESSURE: 56 MMHG
DOP CALC LVOT AREA: 3.1 CM2
DOP CALC LVOT DIAMETER: 2 CM
DOP CALC LVOT PEAK VEL: 1.1 M/S
DOP CALC LVOT STROKE VOLUME: 61.9 CM3
DOP CALCLVOT PEAK VEL VTI: 19.7 CM
E WAVE DECELERATION TIME: 169.61 MSEC
E/A RATIO: 0.73
E/E' RATIO: 9 M/S
ECHO LV POSTERIOR WALL: 0.8 CM (ref 0.6–1.1)
FRACTIONAL SHORTENING: 38.1 % (ref 28–44)
INTERVENTRICULAR SEPTUM: 0.8 CM (ref 0.6–1.1)
LA MAJOR: 5 CM
LA MINOR: 4.72 CM
LA WIDTH: 3.42 CM
LEFT ATRIUM SIZE: 3.5 CM
LEFT ATRIUM VOLUME INDEX MOD: 17.8 ML/M2
LEFT ATRIUM VOLUME INDEX: 27.6 ML/M2
LEFT ATRIUM VOLUME MOD: 31.87 ML
LEFT ATRIUM VOLUME: 49.41 CM3
LEFT INTERNAL DIMENSION IN SYSTOLE: 2.6 CM (ref 2.1–4)
LEFT VENTRICLE DIASTOLIC VOLUME INDEX: 43.58 ML/M2
LEFT VENTRICLE DIASTOLIC VOLUME: 78 ML
LEFT VENTRICLE MASS INDEX: 56.6 G/M2
LEFT VENTRICLE SYSTOLIC VOLUME INDEX: 13.7 ML/M2
LEFT VENTRICLE SYSTOLIC VOLUME: 24.47 ML
LEFT VENTRICULAR INTERNAL DIMENSION IN DIASTOLE: 4.2 CM (ref 3.5–6)
LEFT VENTRICULAR MASS: 101.3 G
LV LATERAL E/E' RATIO: 7.2 M/S
LV SEPTAL E/E' RATIO: 12 M/S
MV A" WAVE DURATION": 12.84 MSEC
MV PEAK A VEL: 0.98 M/S
MV PEAK E VEL: 0.72 M/S
MV STENOSIS PRESSURE HALF TIME: 49.19 MS
MV VALVE AREA P 1/2 METHOD: 4.47 CM2
OHS CV CPX 1 MINUTE RECOVERY HEART RATE: 126 BPM
OHS CV CPX 85 PERCENT MAX PREDICTED HEART RATE MALE: 133
OHS CV CPX ESTIMATED METS: 8
OHS CV CPX MAX PREDICTED HEART RATE: 156
OHS CV CPX PATIENT IS FEMALE: 1
OHS CV CPX PATIENT IS MALE: 0
OHS CV CPX PEAK DIASTOLIC BLOOD PRESSURE: 55 MMHG
OHS CV CPX PEAK HEAR RATE: 151 BPM
OHS CV CPX PEAK RATE PRESSURE PRODUCT: NORMAL
OHS CV CPX PEAK SYSTOLIC BLOOD PRESSURE: 89 MMHG
OHS CV CPX PERCENT MAX PREDICTED HEART RATE ACHIEVED: 101
OHS CV CPX RATE PRESSURE PRODUCT PRESENTING: NORMAL
OHS CV RV/LV RATIO: 0.64 CM
PISA TR MAX VEL: 2.57 M/S
POST STRESS EJECTION FRACTION: 75 %
PULM VEIN S/D RATIO: 2.05
PV PEAK D VEL: 0.43 M/S
PV PEAK S VEL: 0.88 M/S
RA MAJOR: 4.13 CM
RA PRESSURE ESTIMATED: 3 MMHG
RA WIDTH: 2.37 CM
RIGHT VENTRICLE DIASTOLIC BASEL DIMENSION: 2.7 CM
RV TB RVSP: 6 MMHG
SINUS: 3.27 CM
STJ: 2.86 CM
STRESS ECHO POST EXERCISE DUR MIN: 5 MINUTES
STRESS ECHO POST EXERCISE DUR SEC: 24 SECONDS
SYSTOLIC BLOOD PRESSURE: 130 MMHG
TDI LATERAL: 0.1 M/S
TDI SEPTAL: 0.06 M/S
TDI: 0.08 M/S
TR MAX PG: 26 MMHG
TRICUSPID ANNULAR PLANE SYSTOLIC EXCURSION: 2.92 CM
TV REST PULMONARY ARTERY PRESSURE: 29 MMHG
Z-SCORE OF LEFT VENTRICULAR DIMENSION IN END DIASTOLE: -1.64
Z-SCORE OF LEFT VENTRICULAR DIMENSION IN END SYSTOLE: -1.28

## 2024-10-17 PROCEDURE — 93351 STRESS TTE COMPLETE: CPT | Mod: 26,,, | Performed by: INTERNAL MEDICINE

## 2024-10-17 PROCEDURE — 93351 STRESS TTE COMPLETE: CPT | Mod: PO

## 2024-10-17 NOTE — TELEPHONE ENCOUNTER
----- Message from Cristal Enriquez MD sent at 10/17/2024 11:45 AM CDT -----  Ivelisse,  Your stress echo did not reveal ischemia.   However, if your symptoms persist or exacerbate a consult with Cardiology would be appropriate.  Please advise,  Cristal Giordano

## 2024-11-01 RX ORDER — ROSUVASTATIN CALCIUM 20 MG/1
20 TABLET, COATED ORAL
Qty: 90 TABLET | Refills: 2 | Status: SHIPPED | OUTPATIENT
Start: 2024-11-01

## 2024-12-04 RX ORDER — TRIAMTERENE AND HYDROCHLOROTHIAZIDE 37.5; 25 MG/1; MG/1
1 CAPSULE ORAL
Qty: 90 CAPSULE | Refills: 3 | Status: SHIPPED | OUTPATIENT
Start: 2024-12-04

## 2024-12-06 ENCOUNTER — HOSPITAL ENCOUNTER (OUTPATIENT)
Dept: RADIOLOGY | Facility: HOSPITAL | Age: 64
Discharge: HOME | End: 2024-12-06
Attending: INTERNAL MEDICINE
Payer: MEDICARE

## 2024-12-06 DIAGNOSIS — Z12.31 ENCOUNTER FOR SCREENING MAMMOGRAM FOR MALIGNANT NEOPLASM OF BREAST: ICD-10-CM

## 2024-12-06 PROCEDURE — 77067 SCR MAMMO BI INCL CAD: CPT | Mod: 26,,, | Performed by: RADIOLOGY

## 2024-12-06 PROCEDURE — 77063 BREAST TOMOSYNTHESIS BI: CPT | Mod: 26,,, | Performed by: RADIOLOGY

## 2024-12-06 PROCEDURE — 77067 SCR MAMMO BI INCL CAD: CPT | Mod: TC,PO

## 2024-12-11 ENCOUNTER — TELEPHONE (OUTPATIENT)
Dept: INTERNAL MEDICINE | Facility: CLINIC | Age: 64
End: 2024-12-11
Payer: MEDICARE

## 2024-12-11 NOTE — TELEPHONE ENCOUNTER
----- Message from Cristal Enriquez MD sent at 12/11/2024  8:42 AM CST -----  Please note that your mammogram was read as follows  Impression:  There is no mammographic evidence of malignancy.   jay jay

## 2025-01-08 ENCOUNTER — TELEPHONE (OUTPATIENT)
Dept: OTOLARYNGOLOGY | Facility: CLINIC | Age: 65
End: 2025-01-08
Payer: MEDICARE

## 2025-01-08 NOTE — TELEPHONE ENCOUNTER
----- Message from Lamont Espino sent at 1/8/2025 12:11 PM CST -----  Regarding: FW: Sooner Appointment  Contact: [phone]    ----- Message -----  From: Marina Ren  Sent: 1/8/2025  11:18 AM CST  To: [facility] Staff  Subject: Sooner Appointment                               Type:  Sooner Apoointment Request    Caller is requesting a sooner appointment.  Caller declined first available appointment listed below.  Caller will not accept being placed on the waitlist and is requesting a message be sent to doctor.  Name of Caller:Ivelisse  When is the first available appointment?3/2025  Symptoms:ears ringing  Would the patient rather a call back or a response via [facility]? Call or portal  Best Call Back Number:[phone]  Additional Information:     .

## 2025-01-17 ENCOUNTER — OFFICE VISIT (OUTPATIENT)
Dept: OTOLARYNGOLOGY | Facility: CLINIC | Age: 65
End: 2025-01-17
Payer: MEDICARE

## 2025-01-17 ENCOUNTER — CLINICAL SUPPORT (OUTPATIENT)
Dept: AUDIOLOGY | Facility: CLINIC | Age: 65
End: 2025-01-17
Payer: MEDICARE

## 2025-01-17 DIAGNOSIS — H93.13 TINNITUS, SUBJECTIVE, BILATERAL: ICD-10-CM

## 2025-01-17 DIAGNOSIS — H90.3 SENSORINEURAL HEARING LOSS (SNHL), BILATERAL: Primary | ICD-10-CM

## 2025-01-17 DIAGNOSIS — H61.21 RIGHT EAR IMPACTED CERUMEN: ICD-10-CM

## 2025-01-17 DIAGNOSIS — J00 ACUTE RHINITIS: ICD-10-CM

## 2025-01-17 DIAGNOSIS — H90.A31 MIXED CONDUCTIVE AND SENSORINEURAL HEARING LOSS OF RIGHT EAR WITH RESTRICTED HEARING OF LEFT EAR: ICD-10-CM

## 2025-01-17 DIAGNOSIS — H93.13 TINNITUS, BILATERAL: Primary | ICD-10-CM

## 2025-01-17 DIAGNOSIS — H90.A22 SENSORINEURAL HEARING LOSS (SNHL) OF LEFT EAR WITH RESTRICTED HEARING OF RIGHT EAR: ICD-10-CM

## 2025-01-17 PROCEDURE — 99213 OFFICE O/P EST LOW 20 MIN: CPT | Mod: 25,S$GLB,, | Performed by: OTOLARYNGOLOGY

## 2025-01-17 PROCEDURE — 1159F MED LIST DOCD IN RCRD: CPT | Mod: CPTII,S$GLB,, | Performed by: OTOLARYNGOLOGY

## 2025-01-17 PROCEDURE — 99999 PR PBB SHADOW E&M-EST. PATIENT-LVL III: CPT | Mod: PBBFAC,,, | Performed by: OTOLARYNGOLOGY

## 2025-01-17 PROCEDURE — 92567 TYMPANOMETRY: CPT | Mod: S$GLB,,,

## 2025-01-17 PROCEDURE — G0268 REMOVAL OF IMPACTED WAX MD: HCPCS | Mod: S$GLB,,, | Performed by: OTOLARYNGOLOGY

## 2025-01-17 PROCEDURE — 92557 COMPREHENSIVE HEARING TEST: CPT | Mod: S$GLB,,,

## 2025-01-17 PROCEDURE — 99999 PR PBB SHADOW E&M-EST. PATIENT-LVL I: CPT | Mod: PBBFAC,,,

## 2025-01-17 PROCEDURE — 1160F RVW MEDS BY RX/DR IN RCRD: CPT | Mod: CPTII,S$GLB,, | Performed by: OTOLARYNGOLOGY

## 2025-01-17 RX ORDER — AZELASTINE 1 MG/ML
2 SPRAY, METERED NASAL 2 TIMES DAILY
Qty: 30 ML | Refills: 3 | Status: SHIPPED | OUTPATIENT
Start: 2025-01-17 | End: 2026-01-17

## 2025-01-17 NOTE — PATIENT INSTRUCTIONS
Reviewed history, symptoms, exam findings and audiogram.    Reviewed hearing levels. Cleaned right ear of cerumen after audiogram.    No sign of external or middle ear pathology.  Discussed causes of tinnitus as well as management and recommendations.    Consider trial of lipoflavanoid med but may or may not help tinnitus.    Use Flonase or Nasacort daily. Consider adding Astelin nasal spray as needed 1-2 sprays twice a day. (Prescription sent to pharmacy)    Follow up in about 3 months.    TINNITUS MANAGEMENT RECOMMENDATIONS:  Avoid anxiety or stress, as these stimulate an already sensitive hearing system.  Have adequate rest and avoid fatigue.  Avoid the use of stimulants to the nervous system, including coffee (caffeine), alcohol, and smoking (nicotine).  Sleep with your head propped up in an elevated position. This may usually be accomplished with the use of one or two extra pillows. This lessens head congestion, and tinnitus may become less noticeable.  Be aware that tinnitus is usually more noticeable after retiring for the night and the surroundings are quieter. Any noise in the room, such as a ticking clock or softly playing radio, helps to mask tinnitus and make it less irritating.  Use a tinnitus masker if you find this helpful  Some people benefit by using a hearing aid, if they have hearing loss, as it amplifies outside noise (like masking)  Avoid situations that can further damage hearing (excessive noise), and protect your ears from injury and occupational hazards. Use protective ear wear when appropriate.  Counseling may be beneficial for some people.

## 2025-01-17 NOTE — PROGRESS NOTES
"Ivelisse Hay was seen today in the clinic for an audiologic evaluation.  Patient's main complaint was tinnitus, bilaterally. Mrs. Hay reported her symptoms began a couple months ago. She reported at first she felt as though she was hearing a "high pitched ringing" sound coming from something in her home, but reported that the sound persisted no matter where she was. She reported it is very bothersome, constant, and makes it difficulty to fall asleep. Mrs. Hay denied decreased hearing, otalgia, aural fullness, and history of noise exposure. She reported no identifiable triggers, no major health changes, and no medication changes around the time the tinnitus began. Mrs. Hay reported that she tried white noise from her phone once, but that the other notifications from her phone were bothersome.    Tympanometry revealed Type A in the right ear and Type A in the left ear.     Audiogram results revealed a mild to moderate sensorineural hearing loss (SNHL) from 4952-5942 Hz in the right ear and a mild hearing loss from 6678-3104 Hz in the left ear. There was a slight asymmetry noted between ears from 5439-1272 Hz, with the right ear poorer than the left ear.    Speech reception thresholds were noted at 10 dBHL in the right ear and 10 dBHL in the left ear.    Speech discrimination scores were 96% in the right ear and 96% in the left ear.    I counseled Mrs. Hay on tinnitus management techniques (i.e. limiting caffeine intake, getting enough sleep, utilizing white noise/music to mask the tinnitus, and reducing stress/negative emotions associated with tinnitus). I encouraged her to check the American Tinnitus Association website for other management strategies.    Recommendations:  Otologic evaluation  Tinnitus management techniques  Annual audiogram or sooner if change perceived  Hearing protection in noise        "

## 2025-01-17 NOTE — PROGRESS NOTES
65 y/o female self referred for evaluation of tinnitus. Feels like it is in the head - both sides? Started a few months ago. No preceding event or change in med or health condition. Does not feel like things are stressful but has been helping care for grandchild and daughter is expecting another.  had medical issue last month.  Used to walk a good bit but not as much since caring for grand child. Not taking a lot of ASA or NSAIDs. Has taken baby asa for some time. Magnesium glycinate started about 6 months ago but the tinnitus started later. Cannot get away from the sound. High pitch.    Hx of phantom smells a couple years ago. Had CT Sinus (mild disease R frontal and ant/ethmoid). Does not smell at all now.  Admits feels like nose is more congested recently. Using Flonase most days. Some facial pressure - when it happens feels it by right upper nose and cheeks.    PMHx, PSHx, Meds, Allergies, SocHx, FamHx reviewed in EPIC    Review of Systems     Constitutional: Negative for appetite change, chills, fatigue, fever and unexpected weight loss.      HENT: Positive for ringing in the ears and sinus pressure.  Negative for ear discharge, ear pain, hearing loss, postnasal drip, runny nose, sinus infection, stuffy nose, trouble swallowing and voice change.      Eyes:  Negative for change in eyesight, eye drainage, eye itching and photophobia.     Respiratory:  Positive for cough. Negative for shortness of breath and wheezing.      Cardiovascular:  Negative for chest pain, foot swelling, irregular heartbeat and swollen veins.     Gastrointestinal:  Positive for acid reflux. Negative for abdominal pain and heartburn.     Genitourinary: Negative for difficulty urinating, sexual problems and frequent urination.     Musc: Positive for back pain and neck pain. Negative for aching joints.     Skin: Negative for rash.     Allergy: Negative for food allergies and seasonal allergies.     Endocrine: Negative for cold  intolerance and heat intolerance.      Neurological: Negative for dizziness, headaches, light-headedness, seizures and tremors.      Hematologic: Negative for bruises/bleeds easily and swollen glands.      Psychiatric: Negative for decreased concentration, depression, nervous/anxious and sleep disturbance.            PE: RR 14, AF   Gen: female, well nourished, well developed, NAD, cooperative, good historian  Ears:  AD EAC with whorl of tan cerumen pushed medially near TM (cleared - see below) AS EAC patent & TM translucent with normal bony landmarks bilaterally  Nose: external nose wnl, nasal septum deviates to right (60% + obstruction), Left nasal valve collapse, inferior turbinates mild edema, no visible purulence or polyps  OC/OP:  MMM, tongue protrudes midline, palate raises symmetrically, tonsils small symmetric  Voice: strong, good volume, not raspy, no stridor  Neck: supple, no TTP, no LAD or masses  Face:  Mild TTP cheeks & frontal, no erythema or flushing  Respiratory: Breathing comfortably without retractions, no wheezing or rhonchi or crackles  Skin: facial skin intact without visible lesions or flushing  Lymph: no neck lympadenopathy  Neuro:  facial movement symmetric, speech fluid, gait stable, tongue protrudes midline  Psych: normal affect, appropriate    Procedure: Removal of cerumen impaction using microsurgical instrumentation.  After explaining the procedure and obtaining verbal assent, the patient was comfortably positioned and each external auditory canal visualized with magnification. The obstructing cerumen was removed with microsurgical instrumentation, using a suction cannula to reveal patent external auditory canals.  Right canal(s) cleared. The patient tolerated this procedure well without complication.        Impression:   1. Tinnitus, bilateral        2. Sensorineural hearing loss (SNHL) of left ear with restricted hearing of right ear        3. Mixed conductive and sensorineural  hearing loss of right ear with restricted hearing of left ear        4. Right ear impacted cerumen            Discussion and Plan:    Reviewed history, symptoms, exam findings and audiogram.    Reviewed hearing levels. Cleaned right ear of cerumen after audiogram.    Use Flonase or Nasacort daily. Consider adding Astelin nasal spray as needed 1-2 sprays twice a day. (Prescription sent to pharmacy)    No sign of external or middle ear pathology.  Discussed causes of tinnitus as well as management and recommendations. (List of info provided in AVS)    Consider trial of lipoflavanoid - may or may not help.      Follow up in about 3 months.        Parts or all of this note were created by voice recognition software; typographical errors in translating may be present.

## 2025-03-20 ENCOUNTER — PATIENT MESSAGE (OUTPATIENT)
Dept: OTOLARYNGOLOGY | Facility: CLINIC | Age: 65
End: 2025-03-20
Payer: MEDICARE

## 2025-03-24 ENCOUNTER — APPOINTMENT (OUTPATIENT)
Dept: RADIOLOGY | Facility: CLINIC | Age: 65
End: 2025-03-24
Attending: INTERNAL MEDICINE
Payer: MEDICARE

## 2025-03-24 DIAGNOSIS — Z00.00 ENCOUNTER FOR MEDICARE ANNUAL WELLNESS EXAM: ICD-10-CM

## 2025-03-24 DIAGNOSIS — Z78.0 POSTMENOPAUSAL ESTROGEN DEFICIENCY: ICD-10-CM

## 2025-03-24 PROCEDURE — 77080 DXA BONE DENSITY AXIAL: CPT | Mod: TC,PO

## 2025-03-24 PROCEDURE — 77080 DXA BONE DENSITY AXIAL: CPT | Mod: 26,,, | Performed by: INTERNAL MEDICINE

## 2025-03-26 ENCOUNTER — RESULTS FOLLOW-UP (OUTPATIENT)
Dept: INTERNAL MEDICINE | Facility: CLINIC | Age: 65
End: 2025-03-26

## 2025-03-27 NOTE — PROGRESS NOTES
64 y.o. femalepresents in f/u to IFBS/Pre-diabetes, hypertension, and dyslipidemia    PreDM/IFBS tx w/diet  Denied increased thirst, urination, or hypoglycemia episodes  A1C ==>    Lab Results   Component Value Date    HGBA1C 5.5 03/24/2025         HTN tx w/Dyazide and losartan 50mg BID (?)  Also reported spouse said she was using too much salt, but w// her loss of smell she struggles to have food that tastes to stimulate her appetite  Denied HA or dizziness  BP today==>136/68    Dyslipidemia tx w/w/fish oil and rosuvastatin 20mg  Aortic atherosclerosis, asymptomatic  Denied unusual muscle pain or chest pain  LDL==>  Lab Results   Component Value Date    CHOL 179 09/09/2024    CHOL 189 03/05/2024    CHOL 165 12/11/2023     Lab Results   Component Value Date    HDL 68 09/09/2024    HDL 63 03/05/2024    HDL 64 12/11/2023     Lab Results   Component Value Date    LDLCALC 81.0 09/09/2024    LDLCALC 86.6 03/05/2024    LDLCALC 69.8 12/11/2023     Lab Results   Component Value Date    TRIG 150 09/09/2024    TRIG 197 (H) 03/05/2024    TRIG 156 (H) 12/11/2023     Lab Results   Component Value Date    CHOLHDL 38.0 09/09/2024    CHOLHDL 33.3 03/05/2024    CHOLHDL 38.8 12/11/2023     NAFLD, tx diet/weight loss  Wt Readings from Last 5 Encounters:   03/31/25 75 kg (165 lb 5.5 oz)   10/17/24 73.5 kg (162 lb)   09/16/24 73.8 kg (162 lb 11.2 oz)   03/11/24 72.2 kg (159 lb 2.8 oz)   12/26/23 76.7 kg (169 lb 1.5 oz)   She is trying to walk when weather and time permits    Tinnitus, tx: lipoflavanoid - not helpful after 3 mos  ENT w/up, no etiology diagnosed  Interested in holding Dyazide as possible contributor   to sx      ROS:  No fever, chills, or night sweats  No blurry vision or visual disturbance  No dysphagia or early satiety  No palpitations or tachycardia  No cough or wheezing  No GERD or abdominal pain  No numbness or focal deficits  Remainder of review negative except as previously noted    PAST MEDICAL HX: Reviewed  PAST  SURGICAL HX: Reviewed  SOCIAL HX: Reviewed  FAMILY HX: Reviewed    PHYSICAL EXAM:  VS: As noted  GENERAL: WDWN, A&O, NAD, conversant and co-operative  EYES: Conj/lids unremarkable, sclera anicteric,  NECK: Supple w/o lymphadenopathy or thyromegaly  RESPIRATORY: Efforts are unlabored. Lungs CTAP  CARDIOVASCULAR: Heart RRR. No carotid bruits noted. 1+ pedal pulses. No LE edema  GASTROINTESTINAL: BS+, soft , NT/ND, no HSM noted  MUSCULOSKELETAL: Gait normal. No CCE  NEUROLOGIC: CARRILLO. No tremor noted  SKIN: warm and dry    IMPRESSION/PLAN:  HTN, stable  -start amlodipine 5mg  -hold Dyazide  -trend tinnitus for several wks, to see if diuretic contributing  -continue irbesartan   -continue OTC potassium 3 x weekly  HLD.  Stable  -continue rosuvastatin 20 mg q.day   -continue Omega 3   -continue low-fat diet  Aortic atherosclerosis, asx  IFBS/ Pre- DM, stable, A1c 5.8  -continue diabetic diet  NAFLD, stable  -continue low-fat diet  -encourage weight loss and exercise as able  Tinnitus, recent exacerbation, rates at night at 10/10  -will hold diuretic for now to see if contributing  Phantosmia, pt investigating ganglion block that has been shown to be helpful tx  HM  -RTC 6 mos

## 2025-03-31 ENCOUNTER — OFFICE VISIT (OUTPATIENT)
Dept: INTERNAL MEDICINE | Facility: CLINIC | Age: 65
End: 2025-03-31
Payer: MEDICARE

## 2025-03-31 VITALS
TEMPERATURE: 97 F | HEIGHT: 64 IN | DIASTOLIC BLOOD PRESSURE: 68 MMHG | BODY MASS INDEX: 28.24 KG/M2 | WEIGHT: 165.38 LBS | RESPIRATION RATE: 18 BRPM | SYSTOLIC BLOOD PRESSURE: 136 MMHG | HEART RATE: 71 BPM | OXYGEN SATURATION: 97 %

## 2025-03-31 DIAGNOSIS — E78.5 HYPERLIPIDEMIA, UNSPECIFIED HYPERLIPIDEMIA TYPE: ICD-10-CM

## 2025-03-31 DIAGNOSIS — R73.01 IMPAIRED FASTING GLUCOSE: ICD-10-CM

## 2025-03-31 DIAGNOSIS — R73.03 PRE-DIABETES: ICD-10-CM

## 2025-03-31 DIAGNOSIS — H93.19 TINNITUS, UNSPECIFIED LATERALITY: ICD-10-CM

## 2025-03-31 DIAGNOSIS — I10 ESSENTIAL HYPERTENSION: Primary | ICD-10-CM

## 2025-03-31 DIAGNOSIS — R44.2 PHANTOSMIA: ICD-10-CM

## 2025-03-31 DIAGNOSIS — K76.0 NAFLD (NONALCOHOLIC FATTY LIVER DISEASE): ICD-10-CM

## 2025-03-31 DIAGNOSIS — I70.0 AORTIC ATHEROSCLEROSIS: ICD-10-CM

## 2025-03-31 PROCEDURE — 1159F MED LIST DOCD IN RCRD: CPT | Mod: CPTII,S$GLB,, | Performed by: INTERNAL MEDICINE

## 2025-03-31 PROCEDURE — 3078F DIAST BP <80 MM HG: CPT | Mod: CPTII,S$GLB,, | Performed by: INTERNAL MEDICINE

## 2025-03-31 PROCEDURE — G2211 COMPLEX E/M VISIT ADD ON: HCPCS | Mod: S$GLB,,, | Performed by: INTERNAL MEDICINE

## 2025-03-31 PROCEDURE — 3044F HG A1C LEVEL LT 7.0%: CPT | Mod: CPTII,S$GLB,, | Performed by: INTERNAL MEDICINE

## 2025-03-31 PROCEDURE — 3075F SYST BP GE 130 - 139MM HG: CPT | Mod: CPTII,S$GLB,, | Performed by: INTERNAL MEDICINE

## 2025-03-31 PROCEDURE — 99999 PR PBB SHADOW E&M-EST. PATIENT-LVL IV: CPT | Mod: PBBFAC,,, | Performed by: INTERNAL MEDICINE

## 2025-03-31 PROCEDURE — 4010F ACE/ARB THERAPY RXD/TAKEN: CPT | Mod: CPTII,S$GLB,, | Performed by: INTERNAL MEDICINE

## 2025-03-31 PROCEDURE — 99214 OFFICE O/P EST MOD 30 MIN: CPT | Mod: S$GLB,,, | Performed by: INTERNAL MEDICINE

## 2025-03-31 PROCEDURE — 3008F BODY MASS INDEX DOCD: CPT | Mod: CPTII,S$GLB,, | Performed by: INTERNAL MEDICINE

## 2025-03-31 RX ORDER — AMLODIPINE BESYLATE 5 MG/1
5 TABLET ORAL DAILY
Qty: 90 TABLET | Refills: 3 | Status: SHIPPED | OUTPATIENT
Start: 2025-03-31 | End: 2026-03-31

## 2025-03-31 RX ORDER — IRBESARTAN 300 MG/1
300 TABLET ORAL NIGHTLY
Qty: 90 TABLET | Refills: 3 | Status: SHIPPED | OUTPATIENT
Start: 2025-03-31 | End: 2026-03-31

## 2025-04-02 ENCOUNTER — TELEPHONE (OUTPATIENT)
Dept: INTERNAL MEDICINE | Facility: CLINIC | Age: 65
End: 2025-04-02
Payer: MEDICARE

## 2025-04-02 NOTE — TELEPHONE ENCOUNTER
----- Message from Kathleen sent at 4/2/2025  9:53 AM CDT -----  Contact: [phone] Patient  Caller is requesting an earlier appointment then we can schedule.  Caller is requesting a message be sent to the provider.If this is for urgent care symptoms, did you offer other providers at this location, providers at other locations, or [facility]? (yes, no, n/a):  n/aIf this is for the patients physical, did you offer to schedule next available and put on wait list, or to see NP or PA for their physical?  (yes, no, n/a):  n/aWhen is the next available appointment with their provider:  10/01/2025 - wait listed appt 10/6/2025Reason for the appointment:  6 mo F/UPatient preference of timeframe to be scheduled:  Mon, Thurs, or Fri - anytime after 10AMWould the patient like a call back, or a response through their [facility] portal?:   call back Comments:  Sept 2025 marks 6mo

## 2025-04-16 ENCOUNTER — TELEPHONE (OUTPATIENT)
Dept: OTOLARYNGOLOGY | Facility: CLINIC | Age: 65
End: 2025-04-16
Payer: MEDICARE

## 2025-04-16 NOTE — TELEPHONE ENCOUNTER
Spoke with patient and offered an appointment on 04/29. Patient declined and was asking if the provider needed to see her. pt informed that  would like for her to come in-person to address any concerns that she may have about Tinnitus. Pt stated that her tinnitus is not worsening but she is still experiencing the symptoms. Pt also stated that she would like to schedule a one year follow-up instead. One year recall placed but pt was encouraged to call the clinic sooner if her symptoms worsen or if she has any other ENT related issue. Pt verbalized understanding.     ----- Message from BISHNU Mederos sent at 4/16/2025  1:49 PM CDT -----  Regarding: FW: 3 month f/u for ringing in the ears  Contact: [phone]    ----- Message -----  From: Judie Koch  Sent: 4/16/2025   1:23 PM CDT  To: Jesse PRESTON Staff  Subject: 3 month f/u for ringing in the ears              Type:  appt Who Called: the pt is calling to schedule her 3 month f/uSymptoms (please be specific)for ringing in the ears Would the patient rather a call back or a response via [facility]? [facility] Call Back Number: [phone]

## 2025-04-21 ENCOUNTER — OFFICE VISIT (OUTPATIENT)
Dept: URGENT CARE | Facility: CLINIC | Age: 65
End: 2025-04-21
Payer: MEDICARE

## 2025-04-21 VITALS
SYSTOLIC BLOOD PRESSURE: 140 MMHG | OXYGEN SATURATION: 96 % | DIASTOLIC BLOOD PRESSURE: 82 MMHG | WEIGHT: 165 LBS | BODY MASS INDEX: 28.17 KG/M2 | HEART RATE: 96 BPM | TEMPERATURE: 98 F | HEIGHT: 64 IN | RESPIRATION RATE: 18 BRPM

## 2025-04-21 DIAGNOSIS — B96.89 BACTERIAL URI: ICD-10-CM

## 2025-04-21 DIAGNOSIS — R05.1 ACUTE COUGH: ICD-10-CM

## 2025-04-21 DIAGNOSIS — H65.192 ACUTE MUCOID OTITIS MEDIA OF LEFT EAR: Primary | ICD-10-CM

## 2025-04-21 DIAGNOSIS — J06.9 BACTERIAL URI: ICD-10-CM

## 2025-04-21 PROCEDURE — 99214 OFFICE O/P EST MOD 30 MIN: CPT | Mod: S$GLB,,, | Performed by: PHYSICIAN ASSISTANT

## 2025-04-21 RX ORDER — PROMETHAZINE HYDROCHLORIDE AND DEXTROMETHORPHAN HYDROBROMIDE 6.25; 15 MG/5ML; MG/5ML
5 SYRUP ORAL EVERY 4 HOURS PRN
Qty: 118 ML | Refills: 0 | Status: SHIPPED | OUTPATIENT
Start: 2025-04-21 | End: 2025-05-01

## 2025-04-21 RX ORDER — PREDNISONE 20 MG/1
40 TABLET ORAL DAILY
Qty: 8 TABLET | Refills: 0 | Status: SHIPPED | OUTPATIENT
Start: 2025-04-21 | End: 2025-04-25

## 2025-04-21 RX ORDER — AMOXICILLIN AND CLAVULANATE POTASSIUM 875; 125 MG/1; MG/1
1 TABLET, FILM COATED ORAL EVERY 12 HOURS
Qty: 14 TABLET | Refills: 0 | Status: SHIPPED | OUTPATIENT
Start: 2025-04-21 | End: 2025-04-28

## 2025-04-21 NOTE — PROGRESS NOTES
"Subjective:      Patient ID: Ivelisse Hay is a 64 y.o. female.    Vitals:  height is 5' 4" (1.626 m) and weight is 74.8 kg (165 lb). Her oral temperature is 98.4 °F (36.9 °C). Her blood pressure is 140/82 (abnormal) and her pulse is 96. Her respiration is 18 and oxygen saturation is 96%.     Chief Complaint: Cough    Pt has been having a cough for 2 weeks.Pt has been taking mucinex d, robitussin and tessalon for cough.Pt denied having fever. Pt has been producing a yellowish sputum.    Patient provider note starts here:  Patient presents to the clinic with a 2 week history of a cough productive of yellow sputum.  Reports she has been taking Mucinex D, Robitussin and Tessalon Perles for the cough without significant relief.  Denies any wheezing, chest pain or shortness of breath.  Denies having fever.  Also endorses bilateral otalgia and scratchy throat.  is ill with similar symptoms which actually started before the onset of her symptoms.     Cough  This is a new problem. Episode onset: 2 weeks ago. The problem has been unchanged. The problem occurs constantly. The cough is Productive of sputum. Associated symptoms include ear pain, headaches, nasal congestion, postnasal drip and a sore throat. Pertinent negatives include no chest pain, chills, fever, hemoptysis, shortness of breath, sweats or wheezing. Nothing aggravates the symptoms. Treatments tried: otc meds. The treatment provided mild relief. There is no history of bronchiectasis, bronchitis, COPD, environmental allergies or pneumonia.       Constitution: Negative for chills and fever.   HENT:  Positive for ear pain, postnasal drip, sinus pressure and sore throat. Negative for ear discharge.    Neck: Negative for neck pain.   Cardiovascular:  Negative for chest pain, palpitations and sob on exertion.   Respiratory:  Positive for cough and sputum production. Negative for chest tightness, bloody sputum, COPD, shortness of breath, wheezing and " asthma.    Gastrointestinal:  Negative for abdominal pain, vomiting and diarrhea.   Skin:  Negative for color change and wound.   Allergic/Immunologic: Negative for environmental allergies and asthma.   Neurological:  Positive for headaches. Negative for numbness and tingling.      Objective:     Physical Exam   Constitutional: She is oriented to person, place, and time. She appears well-developed. She is cooperative.  Non-toxic appearance. She does not appear ill. No distress.   HENT:   Head: Normocephalic and atraumatic.   Ears:   Right Ear: Hearing, tympanic membrane, external ear and ear canal normal.   Left Ear: Hearing, external ear and ear canal normal.      Comments: Left TM is injected and dull. There is no mastoid TTP.     Nose: Congestion present. No mucosal edema, rhinorrhea or nasal deformity. No epistaxis. Right sinus exhibits no maxillary sinus tenderness and no frontal sinus tenderness. Left sinus exhibits no maxillary sinus tenderness and no frontal sinus tenderness.   Mouth/Throat: Uvula is midline and mucous membranes are normal. No trismus in the jaw. Normal dentition. No uvula swelling. Posterior oropharyngeal erythema present. No oropharyngeal exudate or posterior oropharyngeal edema.   Eyes: Conjunctivae and lids are normal. No scleral icterus.   Neck: Trachea normal and phonation normal. Neck supple. No edema present. No erythema present. No neck rigidity present.   Cardiovascular: Normal rate, regular rhythm, normal heart sounds and normal pulses.   Pulmonary/Chest: Effort normal and breath sounds normal. No respiratory distress. She has no decreased breath sounds. She has no wheezes. She has no rhonchi.   Abdominal: Normal appearance.   Musculoskeletal: Normal range of motion.         General: No deformity. Normal range of motion.   Neurological: She is alert and oriented to person, place, and time. She exhibits normal muscle tone. Coordination normal.   Skin: Skin is warm, dry, intact, not  diaphoretic and not pale.   Psychiatric: Her speech is normal and behavior is normal. Judgment and thought content normal.   Nursing note and vitals reviewed.      Assessment:     1. Acute mucoid otitis media of left ear    2. Acute cough    3. Bacterial URI        Plan:       Acute mucoid otitis media of left ear  -     predniSONE (DELTASONE) 20 MG tablet; Take 2 tablets (40 mg total) by mouth once daily. for 4 days  Dispense: 8 tablet; Refill: 0  -     amoxicillin-clavulanate 875-125mg (AUGMENTIN) 875-125 mg per tablet; Take 1 tablet by mouth every 12 (twelve) hours. for 7 days  Dispense: 14 tablet; Refill: 0    Acute cough  -     predniSONE (DELTASONE) 20 MG tablet; Take 2 tablets (40 mg total) by mouth once daily. for 4 days  Dispense: 8 tablet; Refill: 0  -     promethazine-dextromethorphan (PROMETHAZINE-DM) 6.25-15 mg/5 mL Syrp; Take 5 mLs by mouth every 4 (four) hours as needed (COUGH).  Dispense: 118 mL; Refill: 0    Bacterial URI          Medical Decision Making:   History:   Old Medical Records: I decided to obtain old medical records.  Old Records Summarized: records from clinic visits.  Urgent Care Management:  A. Problem List:   -Acute: Left otitis media, bacterial URI    -Chronic: HTN, HLD, NAFLD   B. Differential diagnosis: viral vs bacterial URI, pharyngitis, otitis, COVID 19, influenza, pneumonia, bronchitis   C. Diagnostic Testing Ordered: None  D. Diagnostic Testing Considered: None  E. Independent Historians: None  F. Urgent Care Midlevel Independent Results Interpretation: None  G. Radiology:  H. Review of Previous Medical Records:  Patient's most recent hemoglobin A1c was 5.6.  I. Home Medications Reviewed  J. Social Determinants of Health considered  K. Medical Decision Making and Disposition:  Patient presents to the clinic with about a 2 week history of productive cough.  On exam, she is afebrile and nontoxic in appearance.  Mucoid effusion is noted on the left ear.  Lungs are clear to  auscultation bilaterally at this time and vital signs are stable.  Advised close follow-up with primary care and strict ED precautions.  She verbalized understanding and agreed with this plan.      Additional MDM:     Heart Failure Score:   COPD = No        Patient Instructions   You have been prescribed a steroid today. Take the prescription as directed. Steroids can increase blood sugar. You can also have the following when taking steroids: flushing, jitteriness, weight gain, fluid retention, bone weakening. If you develop any adverse symptoms, stop taking the medication immediately.    Thank you for choosing [facility]!     Our goal in the Urgent Care is to always provide outstanding medical care. You may receive a survey by mail or e-mail in the next week regarding your experience today. We would greatly appreciate you completing and returning the survey. Your feedback provides us with a way to recognize our staff who provide very good care, and it helps us learn how to improve when your experience was below our aspiration of excellence.       We appreciate you trusting us with your medical care. We hope you feel better soon. We will be happy to take care of you for all of your future medical needs.    You must understand that you've received an Urgent Care treatment only and that you may be released before all your medical problems are known or treated. You, the patient, will arrange for follow up care as instructed.      Follow up with your PCP or specialty clinic as instructed in the next 2-3 days if not improved or as needed. You can call [phone] to schedule an appointment with appropriate provider.      If you condition worsens, we recommend that you receive another evaluation at the emergency room immediately or contact your primary medical clinic's after hours call service to discuss your concerns.      Please return here or go to the Emergency Department for any concerns or worsening  condition.

## 2025-04-21 NOTE — PROGRESS NOTES
Subjective:      Patient ID: Ivelisse Hay is a 64 y.o. female.    Vitals:  vitals were not taken for this visit.     Chief Complaint: Cough    HPI  ROS   Objective:     Physical Exam    Assessment:     No diagnosis found.    Plan:       There are no diagnoses linked to this encounter.

## 2025-04-21 NOTE — PATIENT INSTRUCTIONS
You have been prescribed a steroid today. Take the prescription as directed. Steroids can increase blood sugar. You can also have the following when taking steroids: flushing, jitteriness, weight gain, fluid retention, bone weakening. If you develop any adverse symptoms, stop taking the medication immediately.    Thank you for choosing [facility]!     Our goal in the Urgent Care is to always provide outstanding medical care. You may receive a survey by mail or e-mail in the next week regarding your experience today. We would greatly appreciate you completing and returning the survey. Your feedback provides us with a way to recognize our staff who provide very good care, and it helps us learn how to improve when your experience was below our aspiration of excellence.       We appreciate you trusting us with your medical care. We hope you feel better soon. We will be happy to take care of you for all of your future medical needs.    You must understand that you've received an Urgent Care treatment only and that you may be released before all your medical problems are known or treated. You, the patient, will arrange for follow up care as instructed.      Follow up with your PCP or specialty clinic as instructed in the next 2-3 days if not improved or as needed. You can call [phone] to schedule an appointment with appropriate provider.      If you condition worsens, we recommend that you receive another evaluation at the emergency room immediately or contact your primary medical clinic's after hours call service to discuss your concerns.      Please return here or go to the Emergency Department for any concerns or worsening condition.

## 2025-04-23 ENCOUNTER — TELEPHONE (OUTPATIENT)
Dept: OPTOMETRY | Facility: CLINIC | Age: 65
End: 2025-04-23
Payer: MEDICARE

## 2025-04-23 NOTE — TELEPHONE ENCOUNTER
"----- Message from Azar sent at 4/23/2025  9:06 AM CDT -----  Consult/AdvisoryName Of Caller: SelfContact Preference?: [phone] Provider Name:  Jina is the nature of the call?: Calling to discuss issue w/ her 4/24 appt being cancelled without her consent. Stating she never rescheduled nor cancelled this appt Additional Notes: Also stating she's frustrated because she specifically had this appt scheduled when it was to avoid conflicting w/ her other appts"Thank you for all that you do for our patients"  "

## 2025-05-07 ENCOUNTER — PATIENT MESSAGE (OUTPATIENT)
Dept: OTOLARYNGOLOGY | Facility: CLINIC | Age: 65
End: 2025-05-07
Payer: MEDICARE

## 2025-05-07 ENCOUNTER — PATIENT MESSAGE (OUTPATIENT)
Dept: INTERNAL MEDICINE | Facility: CLINIC | Age: 65
End: 2025-05-07
Payer: MEDICARE

## 2025-05-15 ENCOUNTER — OFFICE VISIT (OUTPATIENT)
Dept: OPTOMETRY | Facility: CLINIC | Age: 65
End: 2025-05-15
Payer: COMMERCIAL

## 2025-05-15 DIAGNOSIS — H52.4 HYPEROPIA WITH ASTIGMATISM AND PRESBYOPIA, BILATERAL: ICD-10-CM

## 2025-05-15 DIAGNOSIS — H25.13 SENILE NUCLEAR SCLEROSIS, BILATERAL: ICD-10-CM

## 2025-05-15 DIAGNOSIS — H52.203 HYPEROPIA WITH ASTIGMATISM AND PRESBYOPIA, BILATERAL: ICD-10-CM

## 2025-05-15 DIAGNOSIS — H52.03 HYPEROPIA WITH ASTIGMATISM AND PRESBYOPIA, BILATERAL: ICD-10-CM

## 2025-05-15 DIAGNOSIS — H04.123 DRY EYE SYNDROME OF BOTH EYES: ICD-10-CM

## 2025-05-15 DIAGNOSIS — H40.013 OAG (OPEN ANGLE GLAUCOMA) SUSPECT, LOW RISK, BILATERAL: Primary | ICD-10-CM

## 2025-05-15 PROCEDURE — 99999 PR PBB SHADOW E&M-EST. PATIENT-LVL III: CPT | Mod: PBBFAC,,, | Performed by: OPTOMETRIST

## 2025-05-15 NOTE — PROGRESS NOTES
HPI     Concerns About Ocular Health     Additional comments: Patient Ivelisse Hay is a 64 year old female.           Comments    BENJAMIN: 07/26/2022 with Dr. Wagner  Chief complaint (CC): Pt here for new patient eye exam. Pt states that she   has to lift up her PAL glasses to see clearer for both distance and near.   Pt states dry eyes, using AT's help with condition. Patient denies   diplopia, headaches, flashes/floaters, and pain.  PD: 65.0 mm    Glasses? Yes  Contacts? No  H/o eye surgery, injections or laser: No  H/o eye injury: No  Known eye conditions?  1. Nuclear sclerosis, bilateral  2. Glaucoma screening  3. Astigmatism of both eyes with presbyopia     Family h/o eye conditions? None  Eye gtts? AT's PRN OU      (-) Flashes (-)  Floaters (-) Mucous   (-)  Tearing (-) Itching (-) Burning   (-) Headaches (-) Eye Pain/discomfort (-) Irritation   (-)  Redness (-) Double vision (+) Blurry vision    Diabetic? No  A1c? N/A              Last edited by Mandy Ruffin on 5/15/2025 10:13 AM.            Assessment /Plan     For exam results, see Encounter Report.      OAG (open angle glaucoma) suspect, low risk, bilateral  (-) FHx- IOP 15 OD, 16 OS. C/d 0.6 OD, 0.5 OS. Educated pt on findings w/understanding. RTC 1 mo IOP/pachy/OCT/24-2 SS HVF.     Dry eye syndrome of both eyes  Recommend iVizia, Systane Ultra or Refresh Optive TID-QID OU to aid with symptoms of dry eyes.    Senile nuclear sclerosis, bilateral  Nuclear sclerotic cataract - not visually significant. Observe.    Hyperopia with astigmatism and presbyopia, bilateral  SRx released to patient. Patient educated on lens options. Normal ocular health. RTC 1 year for routine exam.       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

## 2025-06-09 ENCOUNTER — CLINICAL SUPPORT (OUTPATIENT)
Dept: OPHTHALMOLOGY | Facility: CLINIC | Age: 65
End: 2025-06-09
Payer: MEDICARE

## 2025-06-09 ENCOUNTER — OFFICE VISIT (OUTPATIENT)
Dept: OPTOMETRY | Facility: CLINIC | Age: 65
End: 2025-06-09
Payer: MEDICARE

## 2025-06-09 DIAGNOSIS — H40.013 OAG (OPEN ANGLE GLAUCOMA) SUSPECT, LOW RISK, BILATERAL: ICD-10-CM

## 2025-06-09 DIAGNOSIS — H40.013 OAG (OPEN ANGLE GLAUCOMA) SUSPECT, LOW RISK, BILATERAL: Primary | ICD-10-CM

## 2025-06-09 PROCEDURE — 76514 ECHO EXAM OF EYE THICKNESS: CPT | Mod: S$GLB,,, | Performed by: OPTOMETRIST

## 2025-06-09 PROCEDURE — 99213 OFFICE O/P EST LOW 20 MIN: CPT | Mod: S$GLB,,, | Performed by: OPTOMETRIST

## 2025-06-09 PROCEDURE — 1159F MED LIST DOCD IN RCRD: CPT | Mod: CPTII,S$GLB,, | Performed by: OPTOMETRIST

## 2025-06-09 PROCEDURE — 4010F ACE/ARB THERAPY RXD/TAKEN: CPT | Mod: CPTII,S$GLB,, | Performed by: OPTOMETRIST

## 2025-06-09 PROCEDURE — 99999 PR PBB SHADOW E&M-EST. PATIENT-LVL II: CPT | Mod: PBBFAC,,, | Performed by: OPTOMETRIST

## 2025-06-09 PROCEDURE — 3044F HG A1C LEVEL LT 7.0%: CPT | Mod: CPTII,S$GLB,, | Performed by: OPTOMETRIST

## 2025-06-09 PROCEDURE — 1160F RVW MEDS BY RX/DR IN RCRD: CPT | Mod: CPTII,S$GLB,, | Performed by: OPTOMETRIST

## 2025-06-09 NOTE — PROGRESS NOTES
ELOY    BENJAMIN: 05/25  Chief complaint (CC): patient is ehre for a follow up with   HVF,OCT,pachymetry and IOP check today. Patient hasn't noticed any vision   changes since the last exam.             Glasses? +  Contacts? -  H/o eye surgery, injections or laser: -  H/o eye injury: -  Known eye conditions? See above  Family h/o eye conditions? -  Eye gtts? AT's TID OU      (-) Flashes (-)  Floaters (-) Mucous   (-)  Tearing (-) Itching (-) Burning   (-) Headaches (-) Eye Pain/discomfort (-) Irritation   (-)  Redness (-) Double vision (-) Blurry vision    Diabetic? -  A1c? -      Last edited by Senia Salguero on 6/9/2025  9:59 AM.            Assessment /Plan     For exam results, see Encounter Report.    OAG (open angle glaucoma) suspect, low risk, bilateral      (-) FHx- IOP 15 OD, 16 OS. C/d 0.6 OD, 0.5 OS. Pachy 510 OD, 527 OS.   6/9/2025 OCT WNL OU  6/9/2025 HVF OD WNL, OS ONL-nonspecific defects, low reliability due to high fixation losses  Educated pt on findings w/understanding.   RTC 1 year Routine/OCT/24-2 Emily faster

## 2025-06-09 NOTE — PROGRESS NOTES
OCT/HVF done ou/ rel/fix/coop. Good ou./ chart checked for latex allergy./ 1.50 + .50 x 180/od .75 + 1.25 x 10/os-[facility]

## 2025-07-14 RX ORDER — ALENDRONATE SODIUM 70 MG/1
70 TABLET ORAL
Qty: 12 TABLET | Refills: 3 | Status: SHIPPED | OUTPATIENT
Start: 2025-07-14

## 2025-07-29 RX ORDER — ROSUVASTATIN CALCIUM 20 MG/1
20 TABLET, COATED ORAL
Qty: 90 TABLET | Refills: 3 | Status: SHIPPED | OUTPATIENT
Start: 2025-07-29

## 2025-07-29 NOTE — TELEPHONE ENCOUNTER
Infusion Nursing Note:  Gracy Bautista presents today for Xolair.    Patient seen by provider today: No   present during visit today: Not Applicable.    Note: N/A.      Intravenous Access:  No Intravenous access/labs at this visit.    Treatment Conditions:  Not Applicable.      Post Infusion Assessment:  Patient tolerated injection without incident.       Discharge Plan:   Patient discharged in stable condition accompanied by: ramona.      Dariana Fernández RN     Refill Routing Note   Medication(s) are not appropriate for processing by [facility] for the following reason(s):        Non-participating provider    [facility] action(s):  Route               Appointments  past 12m or future 3m with PCP    Date Provider   Last Visit   3/31/2025 Cristal Enriquez MD   Next Visit   10/6/2025 Cristal Enriquez MD   ED visits in past 90 days: 0        Note composed:2:19 PM 07/29/2025

## (undated) DEVICE — GOWN AERO CHROME W/ TOWEL XL

## (undated) DEVICE — SUT SILK 2-0 PS 18IN BLACK

## (undated) DEVICE — BRA SURGICAL XL 40-42

## (undated) DEVICE — TRAY FOLEY 16FR INFECTION CONT

## (undated) DEVICE — NDL SPINAL SPINOCAN 22GX3.5

## (undated) DEVICE — PACK UNIVERSAL SPLIT II

## (undated) DEVICE — MANIFOLD 4 PORT

## (undated) DEVICE — BLADE SURG CARBON STEEL #10

## (undated) DEVICE — SEE MEDLINE ITEM 152512

## (undated) DEVICE — SPONGE DERMACEA GAUZE 4X4

## (undated) DEVICE — PAD ABD 8X10 STERILE

## (undated) DEVICE — ELECTRODE REM PLYHSV RETURN 9

## (undated) DEVICE — SUT MONOCRYL 3-0 PS-2 UND

## (undated) DEVICE — TRAY MINOR GEN SURG

## (undated) DEVICE — BOVIE SUCTION

## (undated) DEVICE — GOWN SURGICAL X-LARGE

## (undated) DEVICE — SKINMARKER & RULER REGULAR X-F

## (undated) DEVICE — DRAIN CHANNEL ROUND 15FR

## (undated) DEVICE — SEE MEDLINE ITEM 157128

## (undated) DEVICE — ELECTRODE EXTENDED BLADE

## (undated) DEVICE — SYS PRINEO SKIN CLOSURE

## (undated) DEVICE — DRESSING XEROFORM FOIL PK 1X8

## (undated) DEVICE — SUT MCRYL PLUS 4-0 PS2 27IN

## (undated) DEVICE — SPONGE LAP 18X18 PREWASHED

## (undated) DEVICE — BANDAGE KERLIX P/P 2.25IN STER

## (undated) DEVICE — BLADE SURG #15 CARBON STEEL

## (undated) DEVICE — SUT ETHILON 4-0 PS2 18 BLK

## (undated) DEVICE — STAPLER SKIN ROTATING HEAD

## (undated) DEVICE — APPLIER CLIP LIAGCLIP 9.375IN

## (undated) DEVICE — SYR 30CC LUER LOCK

## (undated) DEVICE — EVACUATOR WOUND BULB 100CC

## (undated) DEVICE — SEE MEDLINE ITEM 152622

## (undated) DEVICE — SEE MEDLINE ITEM 146417